# Patient Record
Sex: FEMALE | Race: OTHER | HISPANIC OR LATINO | URBAN - METROPOLITAN AREA
[De-identification: names, ages, dates, MRNs, and addresses within clinical notes are randomized per-mention and may not be internally consistent; named-entity substitution may affect disease eponyms.]

---
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
## 2021-01-07 PROCEDURE — 90853 GROUP PSYCHOTHERAPY: CPT | Mod: 95

## 2021-01-14 PROCEDURE — 90853 GROUP PSYCHOTHERAPY: CPT | Mod: 95

## 2021-01-21 PROCEDURE — 90853 GROUP PSYCHOTHERAPY: CPT | Mod: 95

## 2022-06-08 NOTE — ED ADULT TRIAGE NOTE - NS_BHTRGSOURCE_ED_A_ED_FT
Hi, this is to let you know that your recent results showed:  Stable kidney function tests.  Stable blood sugar.  Normal liver function tests.  Minimal anemia better than before.
Mo Bartlett

## 2022-11-21 ENCOUNTER — OUTPATIENT (OUTPATIENT)
Dept: OUTPATIENT SERVICES | Facility: HOSPITAL | Age: 22
LOS: 1 days | Discharge: HOME | End: 2022-11-21
Payer: COMMERCIAL

## 2022-11-21 DIAGNOSIS — F32.9 MAJOR DEPRESSIVE DISORDER, SINGLE EPISODE, UNSPECIFIED: ICD-10-CM

## 2023-04-14 ENCOUNTER — OUTPATIENT (OUTPATIENT)
Dept: OUTPATIENT SERVICES | Facility: HOSPITAL | Age: 23
LOS: 1 days | Discharge: STILL ADMITTED OR ADMINISTRATIVE | End: 2023-04-14
Payer: COMMERCIAL

## 2023-04-20 NOTE — ED PROVIDER NOTE - CCCP TRG CHIEF CMPLNT
[facility]     Patient : Martha Salmon Age: 30 year old Sex: female   MRN: [id number] Encounter Date: 4/20/2023    History     Chief Complaint   Patient presents with   • Abdominal Pain   • Nausea   • Vomiting     HPI  Patient is a 30-year-old female presents emergency with abdominal anxious ongoing for 3 days.  Patient denies any precipitating or modifying factors but states has been having nausea and vomiting.  Denies any vaginal discharge, chest pain or difficulty breathing.  Patient states that the pain has worsened therefore, presents to the ER.\  No Known Allergies    No past medical history on file.    No past surgical history on file.    No family history on file.    No Known Allergies    Social History     Tobacco Use   • Smoking status: Never   • Smokeless tobacco: Never   Substance Use Topics   • Alcohol use: Not Currently       Review of Systems     Review of Systems   Constitutional: Negative for chills and fatigue.   HENT: Negative.    Eyes: Negative.    Respiratory: Negative for shortness of breath.    Cardiovascular: Negative for chest pain.   Gastrointestinal: Positive for abdominal pain, nausea and vomiting. Negative for diarrhea.   Endocrine: Negative.    Genitourinary: Negative.    Musculoskeletal: Negative.    Skin: Negative.    Allergic/Immunologic: Negative.    Neurological: Negative for dizziness, syncope, weakness and numbness.   Hematological: Negative.    Psychiatric/Behavioral: Negative.    All other systems reviewed and are negative.      Physical Exam     ED Triage Vitals   ED Triage Vitals Group      Temp 04/19/23 1501 98.4 °F (36.9 °C)      Heart Rate 04/19/23 1457 72      Resp 04/19/23 1502 16      BP 04/19/23 1502 117/78      SpO2 04/19/23 1457 100 %      EtCO2 mmHg --       Height 04/19/23 1451 5' 5\" (1.651 m)      Weight 04/19/23 1451 216 lb (98 kg)      Weight Scale Used 04/19/23 1451 ED Stated      BMI (Calculated) 04/19/23 1451  35.94      IBW/kg (Calculated) 04/19/23 1451 57       Physical Exam  Vitals and nursing note reviewed.   Constitutional:       General: She is not in acute distress.     Appearance: Normal appearance.   HENT:      Head: Normocephalic and atraumatic.      Right Ear: External ear normal.      Left Ear: External ear normal.      Nose: Nose normal.      Mouth/Throat:      Mouth: Mucous membranes are moist.      Pharynx: Oropharynx is clear.      Neck: Normal range of motion.   Eyes:      Extraocular Movements: Extraocular movements intact.      Conjunctiva/sclera: Conjunctivae normal.      Pupils: Pupils are equal, round, and reactive to light.   Cardiovascular:      Rate and Rhythm: Normal rate and regular rhythm.      Pulses: Normal pulses.      Heart sounds: Normal heart sounds.   Pulmonary:      Effort: Pulmonary effort is normal. No respiratory distress.      Breath sounds: Normal breath sounds. No wheezing or rales.   Abdominal:      General: Abdomen is flat.      Palpations: Abdomen is soft.      Tenderness: There is abdominal tenderness in the left upper quadrant and left lower quadrant. There is no guarding or rebound.   Genitourinary:     Vagina: Normal.      Cervix: Normal.      Uterus: Normal.    Musculoskeletal:         General: Normal range of motion.   Skin:     General: Skin is warm.      Capillary Refill: Capillary refill takes less than 2 seconds.      Coloration: Skin is not jaundiced or pale.      Findings: No bruising, erythema, lesion or rash.   Neurological:      General: No focal deficit present.      Mental Status: She is alert and oriented to person, place, and time. Mental status is at baseline.   Psychiatric:         Mood and Affect: Mood normal.         Behavior: Behavior normal.         Procedures    ED Course     ED Course as of 04/23/23 1612   Thu Apr 20, 2023   0710 30F LLQ LUQ abd pain, CT negative, poss ovarian cyst. Pending US.  [TA]      ED Course User Index  [TA] Vi  MD Valentina       Lab Results     ED Medication Orders (From admission, onward)    Ordered Start     Status Ordering Provider    04/19/23 1945 04/19/23 1946  acetaminophen (TYLENOL) tablet 1,000 mg  ONCE         Last MAR action: Given EDDIE EMMANUEL          Results for orders placed or performed during the hospital encounter of 04/20/23   Comprehensive Metabolic Panel   Result Value Ref Range    Fasting Status      Sodium 138 135 - 145 mmol/L    Potassium 3.7 3.4 - 5.1 mmol/L    Chloride 102 97 - 110 mmol/L    Carbon Dioxide 30 21 - 32 mmol/L    Anion Gap 10 7 - 19 mmol/L    Glucose 94 70 - 99 mg/dL    BUN 11 6 - 20 mg/dL    Creatinine 1.18 (H) 0.51 - 0.95 mg/dL    Glomerular Filtration Rate 64 >=60    BUN/Cr 9 7 - 25    Calcium 8.8 8.4 - 10.2 mg/dL    Bilirubin, Total 0.2 0.2 - 1.0 mg/dL    GOT/AST 11 <=37 Units/L    GPT/ALT 18 <64 Units/L    Alkaline Phosphatase 49 45 - 117 Units/L    Albumin 4.0 3.6 - 5.1 g/dL    Protein, Total 7.3 6.4 - 8.2 g/dL    Globulin 3.3 2.0 - 4.0 g/dL    A/G Ratio 1.2 1.0 - 2.4   Lipase   Result Value Ref Range    Lipase 109 73 - 393 Units/L   Urinalysis & Reflex Microscopy With Culture If Indicated   Result Value Ref Range    COLOR, URINALYSIS Straw     APPEARANCE, URINALYSIS Clear     GLUCOSE, URINALYSIS Negative Negative mg/dL    BILIRUBIN, URINALYSIS Negative Negative    KETONES, URINALYSIS Negative Negative mg/dL    SPECIFIC GRAVITY, URINALYSIS 1.027 1.005 - 1.030    OCCULT BLOOD, URINALYSIS Negative Negative    PH, URINALYSIS 8.0 (H) 5.0 - 7.0    PROTEIN, URINALYSIS Trace (A) Negative mg/dL    UROBILINOGEN, URINALYSIS 0.2 0.2, 1.0 mg/dL    NITRITE, URINALYSIS Negative Negative    LEUKOCYTE ESTERASE, URINALYSIS Negative Negative   CBC with Automated Differential (performable only)   Result Value Ref Range    WBC 7.5 4.2 - 11.0 K/mcL    RBC 5.00 4.00 - 5.20 mil/mcL    HGB 12.9 12.0 - 15.5 g/dL    HCT 38.7 36.0 - 46.5 %    MCV 77.4 (L) 78.0 - 100.0 fl    MCH 25.8 (L) 26.0 - 34.0 pg     MCHC 33.3 32.0 - 36.5 g/dL    RDW-CV 13.9 11.0 - 15.0 %    RDW-SD 39.0 39.0 - 50.0 fL     140 - 450 K/mcL    NRBC 0 <=0 /100 WBC    Neutrophil, Percent 43 %    Lymphocytes, Percent 49 %    Mono, Percent 5 %    Eosinophils, Percent 2 %    Basophils, Percent 1 %    Immature Granulocytes 0 %    Absolute Neutrophils 3.2 1.8 - 7.7 K/mcL    Absolute Lymphocytes 3.7 1.0 - 4.8 K/mcL    Absolute Monocytes 0.4 0.3 - 0.9 K/mcL    Absolute Eosinophils  0.1 0.0 - 0.5 K/mcL    Absolute Basophils 0.0 0.0 - 0.3 K/mcL    Absolute Immature Granulocytes 0.0 0.0 - 0.2 K/mcL   URINALYSIS, MACROSCOPIC -POINT OF CARE   Result Value Ref Range    COLOR - POINT OF CARE Yellow     APPEARANCE, URINALYSIS - POINT OF CARE Clear     GLUCOSE, URINALYSIS - POINT OF CARE Negative Negative mg/dL    BILIRUBIN, URINALYSIS - POINT OF CARE Negative Negative    KETONES, URINALYSIS - POINT OF CARE Negative Negative mg/dL    SPECIFIC GRAVITY, URINALYSIS - POINT OF CARE 1.015 1.005 - 1.030 NULL    OCCULT BLOOD, URINALYSIS - POINT OF CARE Negative Negative    PH, URINALYSIS - POINT OF CARE 8.5 (H) 5.0 - 7.0 Units    PROTEIN, URINALYSIS - POINT OF CARE 30 (A) Negative mg/dL    UROBILINOGEN, URINALYSIS - POINT OF CARE 0.2 0.2, 1.0 mg/dL    NITRITE, URINALYSIS - POINT OF CARE Negative Negative    WBC ESTERASE, URINALYSIS - POINT OF CARE Negative Negative   HCG POC   Result Value Ref Range    HCG, URINE - POINT OF CARE Negative Negative         Radiology Results     Imaging Results          US PELVIS NON-OB EXTERNAL TRANSABDOMINAL AND INTERNAL TRANSVAGINAL (Final result)  Result time 04/20/23 10:33:13    Final result                 Impression:    Sonographically normal pelvis including the right ovary.            Electronically Signed by: ILA CARDONA M.D.   Signed on: 4/20/2023 10:33 AM   Workstation ID: [id number]             Narrative:    EXAM: Pelvic ultrasound transabdominal and endovaginal    INDICATION prominent ovary on CT from earlier same  episode, abdominal pain     Comparing the CT which reported a prominent right ovary.    FINDINGS:   Normal right ovary 4 x 3.5 x 2 cm.  Normal left ovary 3 x 2.5 x 1.5 cm.   No adnexal mass or cyst.  No free fluid.      Endometrial thickness 9 mm.  Imaging is myometrium.  Uterus 8 x 5 x 4 cm  without mass.       Normal arterial and venous waveforms in each ovary.                                 CT ABDOMEN PELVIS W CONTRAST (Final result)  Result time 04/20/23 07:32:35    Final result                 Impression:    Prominent right ovary.  Small free fluid in the cul-de-sac.   Ultrasound is an option.  Otherwise, nothing remarkable.    Electronically Signed by: INES KRAMER M.D.   Signed on: 4/20/2023 7:32 AM   Workstation ID: [id number]             Narrative:    EXAM:  CT ABDOMEN PELVIS W CONTRAST    CLINICAL INDICATION: Left lower quadrant pain     5 mm axial sections through the lower chest, abdomen and pelvis with IV  contrast.  Multiplanar reconstructions were done.     No prior examination for comparison.      Lung bases are clear.  Specifically, no infiltrates or atelectatic  changes.  No pleural fluid.      The liver, gallbladder, spleen, pancreas, adrenal glands and kidneys  unremarkable.  Stomach is moderately distended with food/debris and air.   No abdominal or pelvic masses or adenopathy.  No large or small bowel  obstructive changes.  Normal appendix.  Unremarkable uterus.  Small free  pelvic fluid in the cul-de-sac.  Slightly prominent right ovary.   Unremarkable lower thoracic and lumbar spine.                                MDM     MDM  Patient is a 30-year-old female presents emergency room abdominal pain, differential is ectopic pregnancy, ovarian pathology versus unknown specified abdominal issue with bowel loops.  Such as diverticulitis versus colitis.  At this time, patient will receive CT scan and laboratory studies    Labs reviewed and noted to be grossly unremarkable    CT scan reviewed  shows no evidence of an acute process except for the enlarged right ovary, therefore pelvic exam was performed    Pelvic exam was within normal limits    Patient signed out pending results of ultrasound, patient reassessed advised of plan for ultrasound and agreeable with discharge should ultrasound be negative    Disposition pending    Diagnosis  1. Acute abdominal pain, unknown origin    Clinical Impression     No diagnosis found.    Disposition     There is no disposition no dispo time  There is no comment      Darcy Kelley MD   4/20/2023 1:04 AM              Darcy Kelley MD  04/23/23 3481     psychiatric evaluation

## 2023-08-03 PROCEDURE — 90853 GROUP PSYCHOTHERAPY: CPT | Mod: 95

## 2023-08-10 PROCEDURE — 90853 GROUP PSYCHOTHERAPY: CPT | Mod: 95

## 2023-08-17 PROCEDURE — 90837 PSYTX W PT 60 MINUTES: CPT | Mod: 95

## 2023-08-24 PROCEDURE — 90853 GROUP PSYCHOTHERAPY: CPT

## 2023-09-07 PROCEDURE — 90853 GROUP PSYCHOTHERAPY: CPT

## 2023-09-14 PROCEDURE — 90853 GROUP PSYCHOTHERAPY: CPT

## 2023-09-21 PROCEDURE — 90834 PSYTX W PT 45 MINUTES: CPT

## 2023-09-28 PROCEDURE — 90853 GROUP PSYCHOTHERAPY: CPT

## 2023-09-30 ENCOUNTER — EMERGENCY (EMERGENCY)
Facility: HOSPITAL | Age: 23
LOS: 1 days | Discharge: HOME | End: 2023-09-30
Admitting: EMERGENCY MEDICINE
Payer: COMMERCIAL

## 2023-09-30 VITALS
OXYGEN SATURATION: 100 % | DIASTOLIC BLOOD PRESSURE: 87 MMHG | HEART RATE: 106 BPM | TEMPERATURE: 98 F | RESPIRATION RATE: 18 BRPM | SYSTOLIC BLOOD PRESSURE: 132 MMHG

## 2023-09-30 DIAGNOSIS — F43.20 ADJUSTMENT DISORDER, UNSPECIFIED: ICD-10-CM

## 2023-09-30 PROCEDURE — 99284 EMERGENCY DEPT VISIT MOD MDM: CPT

## 2023-09-30 PROCEDURE — 90792 PSYCH DIAG EVAL W/MED SRVCS: CPT | Mod: GC

## 2023-09-30 NOTE — ED PROVIDER NOTE - NSICDXPASTMEDICALHX_GEN_ALL_CORE_FT
PAST MEDICAL HISTORY:  Anxiety     Anxiety     Depression     PCOS (polycystic ovarian syndrome)

## 2023-09-30 NOTE — ED BEHAVIORAL HEALTH ASSESSMENT NOTE - SUMMARY
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     Patient presents w/ anxious and affective symptoms, including low mood, hopelessness, anhedonia, and passive thoughts of death, in setting of school-related stressors. Patient adamantly denies active suicidal ideation/intent/plan/research/preparation. Cites parents as protective factors. Future-oriented. Connected to care at [facility]. Able to safety plan. Patient not interested in voluntary admission. Does not present as an imminent danger to herself or others and thus does not require involuntary hospitalization. Will discharge w/ plan for outpatient f/u.

## 2023-09-30 NOTE — ED BEHAVIORAL HEALTH ASSESSMENT NOTE - RISK ASSESSMENT
Acute risk factors include hopelessness, passive SI. Chronic risk factors include history of psychiatric illness, history of prior hospitalization, history of poor coping skills. Protective factors include no active SI, no history SA, in treatment, future oriented, supportive family. Overall, patient poses limited risk of harm to self or others and does not require inpatient hospitalization.

## 2023-09-30 NOTE — ED ADULT NURSE NOTE - NSFALLUNIVINTERV_ED_ALL_ED
Bed/Stretcher in lowest position, wheels locked, appropriate side rails in place/Call bell, personal items and telephone in reach/Instruct patient to call for assistance before getting out of bed/chair/stretcher/Non-slip footwear applied when patient is off stretcher/[place] to call system/Physically safe environment - no spills, clutter or unnecessary equipment/Purposeful proactive rounding/Room/bathroom lighting operational, light cord in reach

## 2023-09-30 NOTE — ED BEHAVIORAL HEALTH ASSESSMENT NOTE - NSBHATTESTCOMMENTATTENDFT_PSY_A_CORE
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   She presents w/ anxious and mildly dysphoric secondary to school stress, and has no clear suicidality (no clear suicidal ideation/intent/plan/research/preparation.)  Patient not interested in voluntary admission. Does not present as an imminent danger to herself or others and thus does not require involuntary hospitalization.     Plan: d/c to self care, follow up outpatient treatment team, safety plan

## 2023-09-30 NOTE — ED PROVIDER NOTE - PATIENT PORTAL LINK FT
You can access the FollowMyHealth Patient Portal offered by [facility] by registering at the following website: [email or web address]. By joining [employer]’s FollowMyHealth portal, you will also be able to view your health information using other applications (apps) compatible with our system.

## 2023-09-30 NOTE — ED ADULT TRIAGE NOTE - CHIEF COMPLAINT QUOTE
Pt c/o worsening depression related to increased stress with school. Pt denies active S/I, states "I have feelings of not wanting to exist." Pt requesting changes to meds, unable to reach therapist. Reports compliance with depression meds.

## 2023-09-30 NOTE — ED BEHAVIORAL HEALTH ASSESSMENT NOTE - HPI (INCLUDE ILLNESS QUALITY, SEVERITY, DURATION, TIMING, CONTEXT, MODIFYING FACTORS, ASSOCIATED SIGNS AND SYMPTOMS)
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     Chart reviewed. Patient tearful, dysphoric at times. Overall, calm and cooperative. Patient reports low mood, hopelessness, anhedonia, and passive thoughts of death since the start of the semester. Patient reports that she is in her 6th year at [employer], w/ plan to graduate in the spring. She reports she has had to leave school numerous times due to pressure to do well at school. Patient feels constant pressure to get perfect grades. Says any grade below A would be a failure. Discusses course-load for this semester. Describes frequent worry about upcoming assignments and exams. Today, patient felt overwhelmed because she had to complete an assignment. Reports feeling intense sadness, loneliness, lack of motivation to complete assignment this morning. Began to feel like "I could not go on anymore." Says school feels pointless because she does not know what her plan is for after college. Although patient endorses passive thoughts of death, she adamantly denies active suicidal ideation/intent/plan/research into means/preparation. Patient is looking forward to graduating and being finished w/ school. She cites her parents as reasons to continue living. Writer and patient discuss various coping skills, including deep breathing and mindfulness. She reports fair sleep, good appetite, and ability to tend to ADLs. She denies history of NSSIB and SA. Denies psychotic symptoms, including paranoia, AH, IOR, MR, TI, TW, TB. No manic symptoms, including decreased need for sleep, persistently elevated and/or irritable mood, and increase in goal-directed behavior. No substance use. Patient not interested in voluntary admission.     Collateral information obtained from patient's father Mo Bartlett. Reports patient has been under a lot of stress since start of schoolyear. Today, patient was sad, crying, anxious because she had to submit an assignment. Father believes, overall, patient had been doing fairly well until today. Says patient is able to take care of herself and has been tending to her ADLs. Denies any recent suicidal comments or behaviors. Father is not acutely concerned for patient's safety. He is not advocating for admission.

## 2023-09-30 NOTE — ED PROVIDER NOTE - OBJECTIVE STATEMENT
22 y/o F  hx Depression, HIV presents to ER  secondary to depression . Admits to multiple stressors.  Denies SI/HI/AH/VH. Denies  falling, punching or kicking any objects. No evidence of physical injuries, broken  skin or deformities. Denies pain, SOB, fever, chills, chest/abdominal discomfort. Denies use of alcohol or illicit drugs.

## 2023-09-30 NOTE — ED PROVIDER NOTE - CLINICAL SUMMARY MEDICAL DECISION MAKING FREE TEXT BOX
Medical evaluation performed. There is no clinical evidence of intoxication or any acute medical problem requiring immediate intervention. Patient is awaiting psychiatric consultation. Final disposition will be determined by psychiatrist. 22 y/o F  Medical evaluation performed. There is no clinical evidence of intoxication or any acute medical problem requiring immediate intervention. Patient is awaiting psychiatric consultation. Final disposition will be determined by psychiatrist.

## 2023-09-30 NOTE — ED BEHAVIORAL HEALTH ASSESSMENT NOTE - DESCRIPTION
No PO or IM PRNs for agitation required.     T(C): 36.7 (30 Sep 2023 17:51), Max: 36.7 (30 Sep 2023 17:51)  T(F): 98 (30 Sep 2023 17:51), Max: 98 (30 Sep 2023 17:51)  HR: 106 (30 Sep 2023 17:51) (106 - 106)  BP: 132/87 (30 Sep 2023 17:51) (132/87 - 132/87)  BP(mean): --  ABP: --  ABP(mean): --  RR: 18 (30 Sep 2023 17:51) (18 - 18)  SpO2: 100% (30 Sep 2023 17:51) (100% - 100%)    O2 Parameters below as of 30 Sep 2023 17:51  Patient On (Oxygen Delivery Method): room air PCOS single, college student, lives w/ parents

## 2023-09-30 NOTE — ED ADULT NURSE NOTE - OBJECTIVE STATEMENT
Received pt in  pt calm c/o depression pt denies si/hi/avh presently, emotional support provided safety & comfort measures maintained eval on going.

## 2023-10-05 PROCEDURE — 90853 GROUP PSYCHOTHERAPY: CPT

## 2023-10-26 PROCEDURE — 90837 PSYTX W PT 60 MINUTES: CPT

## 2023-12-13 ENCOUNTER — INPATIENT (INPATIENT)
Facility: HOSPITAL | Age: 23
LOS: 25 days | Discharge: HOME | End: 2024-01-08
Attending: STUDENT IN AN ORGANIZED HEALTH CARE EDUCATION/TRAINING PROGRAM | Admitting: PSYCHIATRY & NEUROLOGY
Payer: COMMERCIAL

## 2023-12-13 VITALS
OXYGEN SATURATION: 98 % | TEMPERATURE: 98 F | SYSTOLIC BLOOD PRESSURE: 127 MMHG | RESPIRATION RATE: 18 BRPM | HEART RATE: 86 BPM | DIASTOLIC BLOOD PRESSURE: 82 MMHG

## 2023-12-13 DIAGNOSIS — F32.9 MAJOR DEPRESSIVE DISORDER, SINGLE EPISODE, UNSPECIFIED: ICD-10-CM

## 2023-12-13 LAB
ALBUMIN SERPL ELPH-MCNC: 4.2 G/DL — SIGNIFICANT CHANGE UP (ref 3.3–5)
ALBUMIN SERPL ELPH-MCNC: 4.2 G/DL — SIGNIFICANT CHANGE UP (ref 3.3–5)
ALP SERPL-CCNC: 111 U/L — SIGNIFICANT CHANGE UP (ref 40–120)
ALP SERPL-CCNC: 111 U/L — SIGNIFICANT CHANGE UP (ref 40–120)
ALT FLD-CCNC: 26 U/L — SIGNIFICANT CHANGE UP (ref 4–33)
ALT FLD-CCNC: 26 U/L — SIGNIFICANT CHANGE UP (ref 4–33)
AMORPH SED URNS QL MICRO: PRESENT
AMORPH SED URNS QL MICRO: PRESENT
AMPHET UR-MCNC: NEGATIVE — SIGNIFICANT CHANGE UP
AMPHET UR-MCNC: NEGATIVE — SIGNIFICANT CHANGE UP
ANION GAP SERPL CALC-SCNC: 10 MMOL/L — SIGNIFICANT CHANGE UP (ref 7–14)
ANION GAP SERPL CALC-SCNC: 10 MMOL/L — SIGNIFICANT CHANGE UP (ref 7–14)
APAP SERPL-MCNC: <10 UG/ML — LOW (ref 15–25)
APAP SERPL-MCNC: <10 UG/ML — LOW (ref 15–25)
APPEARANCE UR: ABNORMAL
AST SERPL-CCNC: 20 U/L — SIGNIFICANT CHANGE UP (ref 4–32)
AST SERPL-CCNC: 20 U/L — SIGNIFICANT CHANGE UP (ref 4–32)
BACTERIA # UR AUTO: ABNORMAL /HPF
BARBITURATES UR SCN-MCNC: NEGATIVE — SIGNIFICANT CHANGE UP
BARBITURATES UR SCN-MCNC: NEGATIVE — SIGNIFICANT CHANGE UP
BASOPHILS # BLD AUTO: 0.07 K/UL — SIGNIFICANT CHANGE UP (ref 0–0.2)
BASOPHILS # BLD AUTO: 0.07 K/UL — SIGNIFICANT CHANGE UP (ref 0–0.2)
BASOPHILS NFR BLD AUTO: 0.7 % — SIGNIFICANT CHANGE UP (ref 0–2)
BASOPHILS NFR BLD AUTO: 0.7 % — SIGNIFICANT CHANGE UP (ref 0–2)
BENZODIAZ UR-MCNC: NEGATIVE — SIGNIFICANT CHANGE UP
BENZODIAZ UR-MCNC: NEGATIVE — SIGNIFICANT CHANGE UP
BILIRUB SERPL-MCNC: <0.2 MG/DL — SIGNIFICANT CHANGE UP (ref 0.2–1.2)
BILIRUB SERPL-MCNC: <0.2 MG/DL — SIGNIFICANT CHANGE UP (ref 0.2–1.2)
BILIRUB UR-MCNC: NEGATIVE — SIGNIFICANT CHANGE UP
BUN SERPL-MCNC: 10 MG/DL — SIGNIFICANT CHANGE UP (ref 7–23)
BUN SERPL-MCNC: 10 MG/DL — SIGNIFICANT CHANGE UP (ref 7–23)
CALCIUM SERPL-MCNC: 9.7 MG/DL — SIGNIFICANT CHANGE UP (ref 8.4–10.5)
CALCIUM SERPL-MCNC: 9.7 MG/DL — SIGNIFICANT CHANGE UP (ref 8.4–10.5)
CAST: 0 /LPF — SIGNIFICANT CHANGE UP (ref 0–4)
CAST: 0 /LPF — SIGNIFICANT CHANGE UP (ref 0–4)
CAST: 2 /LPF — SIGNIFICANT CHANGE UP (ref 0–4)
CAST: 2 /LPF — SIGNIFICANT CHANGE UP (ref 0–4)
CHLORIDE SERPL-SCNC: 101 MMOL/L — SIGNIFICANT CHANGE UP (ref 98–107)
CHLORIDE SERPL-SCNC: 101 MMOL/L — SIGNIFICANT CHANGE UP (ref 98–107)
CO2 SERPL-SCNC: 27 MMOL/L — SIGNIFICANT CHANGE UP (ref 22–31)
CO2 SERPL-SCNC: 27 MMOL/L — SIGNIFICANT CHANGE UP (ref 22–31)
COCAINE METAB.OTHER UR-MCNC: NEGATIVE — SIGNIFICANT CHANGE UP
COCAINE METAB.OTHER UR-MCNC: NEGATIVE — SIGNIFICANT CHANGE UP
COLOR SPEC: YELLOW — SIGNIFICANT CHANGE UP
CREAT SERPL-MCNC: 0.68 MG/DL — SIGNIFICANT CHANGE UP (ref 0.5–1.3)
CREAT SERPL-MCNC: 0.68 MG/DL — SIGNIFICANT CHANGE UP (ref 0.5–1.3)
CREATININE URINE RESULT, DAU: 112 MG/DL — SIGNIFICANT CHANGE UP
CREATININE URINE RESULT, DAU: 112 MG/DL — SIGNIFICANT CHANGE UP
DIFF PNL FLD: ABNORMAL
DIFF PNL FLD: ABNORMAL
DIFF PNL FLD: NEGATIVE — SIGNIFICANT CHANGE UP
DIFF PNL FLD: NEGATIVE — SIGNIFICANT CHANGE UP
EGFR: 125 ML/MIN/1.73M2 — SIGNIFICANT CHANGE UP
EGFR: 125 ML/MIN/1.73M2 — SIGNIFICANT CHANGE UP
EOSINOPHIL # BLD AUTO: 0.21 K/UL — SIGNIFICANT CHANGE UP (ref 0–0.5)
EOSINOPHIL # BLD AUTO: 0.21 K/UL — SIGNIFICANT CHANGE UP (ref 0–0.5)
EOSINOPHIL NFR BLD AUTO: 2 % — SIGNIFICANT CHANGE UP (ref 0–6)
EOSINOPHIL NFR BLD AUTO: 2 % — SIGNIFICANT CHANGE UP (ref 0–6)
ETHANOL SERPL-MCNC: <10 MG/DL — SIGNIFICANT CHANGE UP
ETHANOL SERPL-MCNC: <10 MG/DL — SIGNIFICANT CHANGE UP
GLUCOSE SERPL-MCNC: 111 MG/DL — HIGH (ref 70–99)
GLUCOSE SERPL-MCNC: 111 MG/DL — HIGH (ref 70–99)
GLUCOSE UR QL: NEGATIVE MG/DL — SIGNIFICANT CHANGE UP
HCG SERPL-ACNC: <1 MIU/ML — SIGNIFICANT CHANGE UP
HCG SERPL-ACNC: <1 MIU/ML — SIGNIFICANT CHANGE UP
HCT VFR BLD CALC: 43.2 % — SIGNIFICANT CHANGE UP (ref 34.5–45)
HCT VFR BLD CALC: 43.2 % — SIGNIFICANT CHANGE UP (ref 34.5–45)
HGB BLD-MCNC: 14 G/DL — SIGNIFICANT CHANGE UP (ref 11.5–15.5)
HGB BLD-MCNC: 14 G/DL — SIGNIFICANT CHANGE UP (ref 11.5–15.5)
IANC: 6.93 K/UL — SIGNIFICANT CHANGE UP (ref 1.8–7.4)
IANC: 6.93 K/UL — SIGNIFICANT CHANGE UP (ref 1.8–7.4)
IMM GRANULOCYTES NFR BLD AUTO: 0.2 % — SIGNIFICANT CHANGE UP (ref 0–0.9)
IMM GRANULOCYTES NFR BLD AUTO: 0.2 % — SIGNIFICANT CHANGE UP (ref 0–0.9)
KETONES UR-MCNC: ABNORMAL MG/DL
KETONES UR-MCNC: ABNORMAL MG/DL
KETONES UR-MCNC: NEGATIVE MG/DL — SIGNIFICANT CHANGE UP
KETONES UR-MCNC: NEGATIVE MG/DL — SIGNIFICANT CHANGE UP
LEUKOCYTE ESTERASE UR-ACNC: ABNORMAL
LYMPHOCYTES # BLD AUTO: 2.8 K/UL — SIGNIFICANT CHANGE UP (ref 1–3.3)
LYMPHOCYTES # BLD AUTO: 2.8 K/UL — SIGNIFICANT CHANGE UP (ref 1–3.3)
LYMPHOCYTES # BLD AUTO: 26.4 % — SIGNIFICANT CHANGE UP (ref 13–44)
LYMPHOCYTES # BLD AUTO: 26.4 % — SIGNIFICANT CHANGE UP (ref 13–44)
MCHC RBC-ENTMCNC: 27.8 PG — SIGNIFICANT CHANGE UP (ref 27–34)
MCHC RBC-ENTMCNC: 27.8 PG — SIGNIFICANT CHANGE UP (ref 27–34)
MCHC RBC-ENTMCNC: 32.4 GM/DL — SIGNIFICANT CHANGE UP (ref 32–36)
MCHC RBC-ENTMCNC: 32.4 GM/DL — SIGNIFICANT CHANGE UP (ref 32–36)
MCV RBC AUTO: 85.7 FL — SIGNIFICANT CHANGE UP (ref 80–100)
MCV RBC AUTO: 85.7 FL — SIGNIFICANT CHANGE UP (ref 80–100)
METHADONE UR-MCNC: NEGATIVE — SIGNIFICANT CHANGE UP
METHADONE UR-MCNC: NEGATIVE — SIGNIFICANT CHANGE UP
MONOCYTES # BLD AUTO: 0.58 K/UL — SIGNIFICANT CHANGE UP (ref 0–0.9)
MONOCYTES # BLD AUTO: 0.58 K/UL — SIGNIFICANT CHANGE UP (ref 0–0.9)
MONOCYTES NFR BLD AUTO: 5.5 % — SIGNIFICANT CHANGE UP (ref 2–14)
MONOCYTES NFR BLD AUTO: 5.5 % — SIGNIFICANT CHANGE UP (ref 2–14)
NEUTROPHILS # BLD AUTO: 6.93 K/UL — SIGNIFICANT CHANGE UP (ref 1.8–7.4)
NEUTROPHILS # BLD AUTO: 6.93 K/UL — SIGNIFICANT CHANGE UP (ref 1.8–7.4)
NEUTROPHILS NFR BLD AUTO: 65.2 % — SIGNIFICANT CHANGE UP (ref 43–77)
NEUTROPHILS NFR BLD AUTO: 65.2 % — SIGNIFICANT CHANGE UP (ref 43–77)
NITRITE UR-MCNC: NEGATIVE — SIGNIFICANT CHANGE UP
NRBC # BLD: 0 /100 WBCS — SIGNIFICANT CHANGE UP (ref 0–0)
NRBC # BLD: 0 /100 WBCS — SIGNIFICANT CHANGE UP (ref 0–0)
NRBC # FLD: 0 K/UL — SIGNIFICANT CHANGE UP (ref 0–0)
NRBC # FLD: 0 K/UL — SIGNIFICANT CHANGE UP (ref 0–0)
OPIATES UR-MCNC: NEGATIVE — SIGNIFICANT CHANGE UP
OPIATES UR-MCNC: NEGATIVE — SIGNIFICANT CHANGE UP
OXYCODONE UR-MCNC: NEGATIVE — SIGNIFICANT CHANGE UP
OXYCODONE UR-MCNC: NEGATIVE — SIGNIFICANT CHANGE UP
PCP SPEC-MCNC: SIGNIFICANT CHANGE UP
PCP SPEC-MCNC: SIGNIFICANT CHANGE UP
PCP UR-MCNC: NEGATIVE — SIGNIFICANT CHANGE UP
PCP UR-MCNC: NEGATIVE — SIGNIFICANT CHANGE UP
PH UR: 6 — SIGNIFICANT CHANGE UP (ref 5–8)
PH UR: 6 — SIGNIFICANT CHANGE UP (ref 5–8)
PH UR: 7.5 — SIGNIFICANT CHANGE UP (ref 5–8)
PH UR: 7.5 — SIGNIFICANT CHANGE UP (ref 5–8)
PLATELET # BLD AUTO: 361 K/UL — SIGNIFICANT CHANGE UP (ref 150–400)
PLATELET # BLD AUTO: 361 K/UL — SIGNIFICANT CHANGE UP (ref 150–400)
POTASSIUM SERPL-MCNC: 4.2 MMOL/L — SIGNIFICANT CHANGE UP (ref 3.5–5.3)
POTASSIUM SERPL-MCNC: 4.2 MMOL/L — SIGNIFICANT CHANGE UP (ref 3.5–5.3)
POTASSIUM SERPL-SCNC: 4.2 MMOL/L — SIGNIFICANT CHANGE UP (ref 3.5–5.3)
POTASSIUM SERPL-SCNC: 4.2 MMOL/L — SIGNIFICANT CHANGE UP (ref 3.5–5.3)
PROT SERPL-MCNC: 7.6 G/DL — SIGNIFICANT CHANGE UP (ref 6–8.3)
PROT SERPL-MCNC: 7.6 G/DL — SIGNIFICANT CHANGE UP (ref 6–8.3)
PROT UR-MCNC: NEGATIVE MG/DL — SIGNIFICANT CHANGE UP
RBC # BLD: 5.04 M/UL — SIGNIFICANT CHANGE UP (ref 3.8–5.2)
RBC # BLD: 5.04 M/UL — SIGNIFICANT CHANGE UP (ref 3.8–5.2)
RBC # FLD: 13.2 % — SIGNIFICANT CHANGE UP (ref 10.3–14.5)
RBC # FLD: 13.2 % — SIGNIFICANT CHANGE UP (ref 10.3–14.5)
RBC CASTS # UR COMP ASSIST: 4 /HPF — SIGNIFICANT CHANGE UP (ref 0–4)
RBC CASTS # UR COMP ASSIST: 4 /HPF — SIGNIFICANT CHANGE UP (ref 0–4)
RBC CASTS # UR COMP ASSIST: 5 /HPF — HIGH (ref 0–4)
RBC CASTS # UR COMP ASSIST: 5 /HPF — HIGH (ref 0–4)
REVIEW: SIGNIFICANT CHANGE UP
REVIEW: SIGNIFICANT CHANGE UP
SALICYLATES SERPL-MCNC: <0.3 MG/DL — LOW (ref 15–30)
SALICYLATES SERPL-MCNC: <0.3 MG/DL — LOW (ref 15–30)
SARS-COV-2 RNA SPEC QL NAA+PROBE: SIGNIFICANT CHANGE UP
SARS-COV-2 RNA SPEC QL NAA+PROBE: SIGNIFICANT CHANGE UP
SODIUM SERPL-SCNC: 138 MMOL/L — SIGNIFICANT CHANGE UP (ref 135–145)
SODIUM SERPL-SCNC: 138 MMOL/L — SIGNIFICANT CHANGE UP (ref 135–145)
SP GR SPEC: 1.02 — SIGNIFICANT CHANGE UP (ref 1–1.03)
SQUAMOUS # UR AUTO: 10 /HPF — HIGH (ref 0–5)
SQUAMOUS # UR AUTO: 10 /HPF — HIGH (ref 0–5)
SQUAMOUS # UR AUTO: 20 /HPF — HIGH (ref 0–5)
SQUAMOUS # UR AUTO: 20 /HPF — HIGH (ref 0–5)
THC UR QL: NEGATIVE — SIGNIFICANT CHANGE UP
THC UR QL: NEGATIVE — SIGNIFICANT CHANGE UP
TOXICOLOGY SCREEN, DRUGS OF ABUSE, SERUM RESULT: SIGNIFICANT CHANGE UP
TOXICOLOGY SCREEN, DRUGS OF ABUSE, SERUM RESULT: SIGNIFICANT CHANGE UP
TSH SERPL-MCNC: 0.72 UIU/ML — SIGNIFICANT CHANGE UP (ref 0.27–4.2)
TSH SERPL-MCNC: 0.72 UIU/ML — SIGNIFICANT CHANGE UP (ref 0.27–4.2)
UROBILINOGEN FLD QL: 0.2 MG/DL — SIGNIFICANT CHANGE UP (ref 0.2–1)
WBC # BLD: 10.61 K/UL — HIGH (ref 3.8–10.5)
WBC # BLD: 10.61 K/UL — HIGH (ref 3.8–10.5)
WBC # FLD AUTO: 10.61 K/UL — HIGH (ref 3.8–10.5)
WBC # FLD AUTO: 10.61 K/UL — HIGH (ref 3.8–10.5)
WBC UR QL: 18 /HPF — HIGH (ref 0–5)
WBC UR QL: 18 /HPF — HIGH (ref 0–5)
WBC UR QL: 37 /HPF — HIGH (ref 0–5)
WBC UR QL: 37 /HPF — HIGH (ref 0–5)

## 2023-12-13 PROCEDURE — 99285 EMERGENCY DEPT VISIT HI MDM: CPT

## 2023-12-13 RX ORDER — DIPHENHYDRAMINE HCL 50 MG
50 CAPSULE ORAL EVERY 6 HOURS
Refills: 0 | Status: DISCONTINUED | OUTPATIENT
Start: 2023-12-14 | End: 2024-01-08

## 2023-12-13 RX ORDER — QUETIAPINE FUMARATE 200 MG/1
150 TABLET, FILM COATED ORAL ONCE
Refills: 0 | Status: COMPLETED | OUTPATIENT
Start: 2023-12-13 | End: 2023-12-13

## 2023-12-13 RX ORDER — HALOPERIDOL DECANOATE 100 MG/ML
5 INJECTION INTRAMUSCULAR EVERY 6 HOURS
Refills: 0 | Status: DISCONTINUED | OUTPATIENT
Start: 2023-12-14 | End: 2024-01-08

## 2023-12-13 RX ORDER — HALOPERIDOL DECANOATE 100 MG/ML
5 INJECTION INTRAMUSCULAR ONCE
Refills: 0 | Status: DISCONTINUED | OUTPATIENT
Start: 2023-12-14 | End: 2024-01-08

## 2023-12-13 RX ORDER — VENLAFAXINE HCL 75 MG
75 CAPSULE, EXT RELEASE 24 HR ORAL DAILY
Refills: 0 | Status: DISCONTINUED | OUTPATIENT
Start: 2023-12-14 | End: 2023-12-14

## 2023-12-13 RX ORDER — DIPHENHYDRAMINE HCL 50 MG
50 CAPSULE ORAL ONCE
Refills: 0 | Status: DISCONTINUED | OUTPATIENT
Start: 2023-12-14 | End: 2024-01-08

## 2023-12-13 RX ORDER — CLONAZEPAM 1 MG
1 TABLET ORAL DAILY
Refills: 0 | Status: DISCONTINUED | OUTPATIENT
Start: 2023-12-14 | End: 2023-12-14

## 2023-12-13 RX ADMIN — QUETIAPINE FUMARATE 150 MILLIGRAM(S): 200 TABLET, FILM COATED ORAL at 22:39

## 2023-12-13 NOTE — ED ADULT NURSE NOTE - OBJECTIVE STATEMENT
Pt arrives via walk-in from home, bib dad, reports worsening depression w/o SI. Pt also denies hi,ah,vh,etoh, drug use. Pt reports she is only sleeping and eating at home, not showering , not in school or work, and lacks motivation. Calm and cooperative

## 2023-12-13 NOTE — ED BEHAVIORAL HEALTH ASSESSMENT NOTE - CURRENT PLAN:
----- Message from Andrae Vasquez sent at 11/2/2017  8:47 AM CDT -----  Pt is returning a call to nurse.          Please call pt back [phone]   None known

## 2023-12-13 NOTE — ED PROVIDER NOTE - CLINICAL SUMMARY MEDICAL DECISION MAKING FREE TEXT BOX
This is a 23-year-old female past medical history PCOS (not on med),  past psychiatric history anxiety depression with complaint of increased depression. Endorses she is not functioning well oversleeping and overeating, she had to withdraw from college last week because she felt very overwhelmed and were not able to complete her assignments. Endorses her psychiatrist started her on Seroquel 200 mg at the nighttime for the last 3 weeks but believes medication is not helping her. Taking Effexor 75 mg daily. Reports previous psychiatric inpatient hospitalization in 2019 for severe depression. Patient endorses suicidal thoughts in the context of passive ideation, "she  wish she would not be here" but would not kill herself, protective factors her family's Mandaen believes.  She is  seeking for inpatient psychiatric treatment.   father Mo   labs, psych This is a 23-year-old female past medical history PCOS (not on med),  past psychiatric history anxiety depression with complaint of increased depression. Endorses she is not functioning well oversleeping and overeating, she had to withdraw from college last week because she felt very overwhelmed and were not able to complete her assignments. Endorses her psychiatrist started her on Seroquel 200 mg at the nighttime for the last 3 weeks but believes medication is not helping her. Taking Effexor 75 mg daily. Reports previous psychiatric inpatient hospitalization in 2019 for severe depression. Patient endorses suicidal thoughts in the context of passive ideation, "she  wish she would not be here" but would not kill herself, protective factors her family's Amish believes.  She is  seeking for inpatient psychiatric treatment.   father Mo   labs, psych

## 2023-12-13 NOTE — ED ADULT NURSE NOTE - NSFALLUNIVINTERV_ED_ALL_ED
Bed/Stretcher in lowest position, wheels locked, appropriate side rails in place/Call bell, personal items and telephone in reach/Instruct patient to call for assistance before getting out of bed/chair/stretcher/Non-slip footwear applied when patient is off stretcher/[place] to call system/Physically safe environment - no spills, clutter or unnecessary equipment/Purposeful proactive rounding/Room/bathroom lighting operational, light cord in reach Bed/Stretcher in lowest position, wheels locked, appropriate side rails in place/Call bell, personal items and telephone in reach/Instruct patient to call for assistance before getting out of bed/chair/stretcher/Non-slip footwear applied when patient is off stretcher/[place] to call system/Physically safe environment - no spills, clutter or unnecessary equipment/Purposeful proactive rounding/Room/bathroom lighting operational, light cord in reach

## 2023-12-13 NOTE — ED BEHAVIORAL HEALTH ASSESSMENT NOTE - NSBHATTESTCOMMENTATTENDFT_PSY_A_CORE
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 Pt has been increasingly depressed, recently dropped her full caseload of online courses, has not been out of bed for the past 2 days. Pt is anxious, depressed, failing outpt level of care, despite compliance. Pt has passive SI as well. Though she has a good support system, pt is declining in her functioning. both outpt provider and family are advocating for admission at this time. Pt is amenable to voluntary admission. Pending bed. 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 Pt has been increasingly depressed, recently dropped her full caseload of online courses, has not been out of bed for the past 2 days. Pt is anxious, depressed, failing outpt level of care, despite compliance. Pt has passive SI as well. Though she has a good support system, pt is declining in her functioning. both outpt provider and family are advocating for admission at this time. Pt is amenable to voluntary admission. Pending bed.

## 2023-12-13 NOTE — ED BEHAVIORAL HEALTH NOTE - BEHAVIORAL HEALTH NOTE
As per request of provider, Writer contacted Mo Bartlett [phone] (Father) to obtain collateral information. The following information is per the father.    Patient is a 22 yo female domiciled w/ father, student [employer], hx of depression, bib father.    Reason for ED visit:  today father asked pt to get up because she has been in bed for the past 2 days not.  He says pt said she wanted to stay in bed until she deteriorated. He says pt has declined to shower, brush her teeth or get out of bed for the pats 2 days.    Symptoms/Hx: 2 days not caring for herself. He says pt presents depressed currently. He says pt endorsed passive si. Pt has no hx of si and no past suicide attempts. He denied any AVH, paranoia or delusions. He says pt’s hygiene is poor, appetite has decreased and pt is oversleeping.  He says pt had been struggling with college and dropped her classes one weeks ago which he suspects is the trigger.    Baseline: goes out, spends time with family, attends school.    Drug/alcohol use:  no drug or alcohol use reported.    Stressors:  college.    Treatment team: in treatment at [facility] o/p. father did not know specific details. He says pt has one prior psych admission a few years ago at [facility].    medical problems:  none reported.    Medication : unsure of medication. Compliant w/ medication.     Violence/aggression: no access to firearms.    Family Hx: mother has some depression as per .    Dispo: He thinks pt would benefit from psych admission. As per request of provider, Writer contacted Mo Bartlett [phone] (Father) to obtain collateral information. The following information is per the father.    Patient is a 24 yo female domiciled w/ father, student at [employer], hx of depression, bib father.    Reason for ED visit:  today father asked pt to get up because she has been in bed for the past 2 days not.  He says pt said she wanted to stay in bed until she deteriorated. He says pt has declined to shower, brush her teeth or get out of bed for the pats 2 days.    Symptoms/Hx: 2 days not caring for herself. He says pt presents depressed currently. He says pt endorsed passive si. Pt has no hx of si and no past suicide attempts. He denied any AVH, paranoia or delusions. He says pt’s hygiene is poor, appetite has decreased and pt is oversleeping.  He says pt had been struggling with college and dropped her classes one weeks ago which he suspects is the trigger.    Baseline: goes out, spends time with family, attends school.    Drug/alcohol use:  no drug or alcohol use reported.    Stressors:  college.    Treatment team: in treatment at [facility] o/p. father did not know specific details. He says pt has one prior psych admission a few years ago at [facility].    medical problems:  none reported.    Medication : unsure of medication. Compliant w/ medication.     Violence/aggression: no access to firearms.    Family Hx: mother has some depression as per .    Dispo: He thinks pt would benefit from psych admission. As per request of provider, Writer contacted Mo Bartlett [phone] (Father) to obtain collateral information. The following information is per the father.    Patient is a 22 yo female domiciled w/ father, student at [employer], hx of depression, bib father.    Reason for ED visit:  today father asked pt to get up because she has been in bed for the past 2 days not.  He says pt said she wanted to stay in bed until she deteriorated. He says pt has declined to shower, brush her teeth or get out of bed for the pats 2 days.    Symptoms/Hx: 2 days not caring for herself. He says pt presents depressed currently. He says pt endorsed passive si. Pt has no hx of si and no past suicide attempts. He denied any AVH, paranoia or delusions. He says pt’s hygiene is poor, appetite has decreased and pt is oversleeping.  He says pt had been struggling with college and dropped her classes one weeks ago which he suspects is the trigger.    Baseline: goes out, spends time with family, attends school.    Drug/alcohol use:  no drug or alcohol use reported.    Stressors:  college.    Treatment team: in treatment at [facility] o/p. father did not know specific details. He says pt has one prior psych admission a few years ago at [facility].    medical problems:  none reported.    Medication : unsure of medication. Compliant w/ medication.     Violence/aggression: no access to firearms.    Family Hx: mother has some depression as per .    Dispo: He thinks pt would benefit from psych admission. writer informed father of pt's admission and that he is boarding due to bed availability. As per request of provider, Writer contacted Mo Bartlett [phone] (Father) to obtain collateral information. The following information is per the father.    Patient is a 22 yo female domiciled w/ father, student at [employer], hx of depression, bib father.    Reason for ED visit:  today father asked pt to get up because she has been in bed for the past 2 days not.  He says pt said she wanted to stay in bed until she deteriorated. He says pt has declined to shower, brush her teeth or get out of bed for the pats 2 days.    Symptoms/Hx: 2 days not caring for herself. He says pt presents depressed currently. He says pt endorsed passive si. Pt has no hx of si and no past suicide attempts. He denied any AVH, paranoia or delusions. He says pt’s hygiene is poor, appetite has decreased and pt is oversleeping.  He says pt had been struggling with college and dropped her classes one weeks ago which he suspects is the trigger.    Baseline: goes out, spends time with family, attends school.    Drug/alcohol use:  no drug or alcohol use reported.    Stressors:  college.    Treatment team: in treatment at [facility] o/p. father did not know specific details. He says pt has one prior psych admission a few years ago at [facility].    medical problems:  none reported.    Medication : unsure of medication. Compliant w/ medication.     Violence/aggression: no access to firearms.    Family Hx: mother has some depression as per .    Dispo: He thinks pt would benefit from psych admission. writer informed father of pt's admission and that he is boarding due to bed availability.

## 2023-12-13 NOTE — ED BEHAVIORAL HEALTH ASSESSMENT NOTE - HPI (INCLUDE ILLNESS QUALITY, SEVERITY, DURATION, TIMING, CONTEXT, MODIFYING FACTORS, ASSOCIATED SIGNS AND SYMPTOMS)
23F, college student, single, domiciled with parents and sister with a PPHX of MDD, GEORGE, social anxiety disorder, avoidant personality disorder, currently in treatment at [facility], 1 psych hosp (11/7-11/15/19, for depression and poor affect regulation), no history of SA or NSSIB, no history of violence/aggression/legal issues, no history of substance use, PMH sig for PCOS, who presents to ED BIB by father for low mood and passive thoughts of death in setting of school-related stressors.     Calm and cooperative with interview. Neutral affect, linear.   Pt reports months long hx of low mood with worsening over past three days. Trigger unknown but of note pt withdrew from all her college courses 1 week ago in setting of "I wasn't doing my work and scared of failing". Over the past 3 days, reports hypersomnia, not wanting to get out of bed, decreased attn to ADLs (not showering or brushing teeth), anhedonia, hopelessness and worthlessness. No changes in appetite. Pt endorses passive SI, "my parents and sister will be better off without me", but denies intent or plan. No preparatory acts. States passive SI is chornic. No hx of SA. No NSSIB. Patient is interested in voluntary admission. Pt with one previous admission in 2019 for similar presentation. Denies psychotic symptoms, including paranoia, AH, IOR, MR, TI, TW, TB. No manic symptoms, including decreased need for sleep, persistently elevated and/or irritable mood, and increase in goal-directed behavior. No substance use. Currently in treatment with Dr. Sanders at [facility]. Adherent with treatment. Adherent with medications: effexor 75 mg, seroquel 150mg, klonopin 1 mg prn     Collateral information obtained from patient's father Mo Bartlett, see chart note. 23F, college student, single, domiciled with parents and sister with a PPHX of MDD, GEORGE, social anxiety disorder, avoidant personality disorder, currently in treatment at [facility], 1 psych hosp (11/7-11/15/19, for depression and poor affect regulation), no history of SA or NSSIB, no history of violence/aggression/legal issues, no history of substance use, PMH sig for PCOS, who presents to ED BIB by father for low mood and passive thoughts of death in setting of school-related stressors.     Calm and cooperative with interview. Neutral affect, linear.   Pt reports months long hx of low mood with worsening over past three days. Trigger unknown but of note pt withdrew from all her college courses 1 week ago in setting of "I wasn't doing my work and scared of failing". Over the past 3 days, reports hypersomnia, not wanting to get out of bed, decreased attn to ADLs (not showering or brushing teeth), anhedonia, hopelessness and worthlessness. No changes in appetite. Pt endorses passive SI, "my parents and sister will be better off without me", but denies intent or plan. No preparatory acts. States passive SI is chornic. No hx of SA. No NSSIB. Patient is interested in voluntary admission. Pt with one previous admission in 2019 for similar presentation. Denies psychotic symptoms, including paranoia, AH, IOR, MR, TI, TW, TB. No manic symptoms, including decreased need for sleep, persistently elevated and/or irritable mood, and increase in goal-directed behavior. No substance use. Currently in treatment with Dr. Sanders at [facility]. Adherent with treatment. Adherent with medications: effexor 75 mg, seroquel 150mg, klonopin 1 mg prn     Collateral information obtained from patient's father Mo Bartlett, see chart note. 23F, college student, single, domiciled with parents and sister with a PPHX of MDD, GEORGE, social anxiety disorder, avoidant personality disorder, currently in treatment at [facility], 1 psych hosp (11/7-11/15/19, for depression and poor affect regulation), no history of SA or NSSIB, no history of violence/aggression/legal issues, no history of substance use, PMH sig for PCOS, who presents to ED BIB by father for low mood and passive thoughts of death in setting of school-related stressors.     Calm and cooperative with interview. Neutral affect, linear.   Pt reports months long hx of low mood with worsening over past three days. Trigger unknown but of note pt withdrew from all her college courses 1 week ago in setting of "I wasn't doing my work and scared of failing". Over the past 3 days, reports hypersomnia, not wanting to get out of bed, decreased attn to ADLs (not showering or brushing teeth), anhedonia, hopelessness and worthlessness. No changes in appetite. Pt endorses passive SI, "my parents and sister will be better off without me", but denies intent or plan. No preparatory acts. States passive SI is chornic. No hx of SA. No NSSIB. Patient is interested in voluntary admission. Pt with one previous admission in 2019 for similar presentation. Denies psychotic symptoms, including paranoia, AH, IOR, MR, TI, TW, TB. No manic symptoms, including decreased need for sleep, persistently elevated and/or irritable mood, and increase in goal-directed behavior. No substance use. Currently in treatment with Dr. Sanders at [facility]. Adherent with treatment. Adherent with medications: effexor 75 mg, seroquel 150mg, klonopin 1 mg prn     Collateral information obtained from patient's father Mo Bartlett, see chart note.    Collateral obtained from Dr. Sanders- pt has been struggling with persistent mood symptoms and passive SI in context of school and family stressors. At most recent appointment, pt stated that they felt "they would lose control" and hospitalization was "coming". Dr. Sanders concerned for pt's ability to be managed at current level of care and is advocating for higher level of care. Pt medications are currently being titrated, last change was on 12/11. Has been considering switching medication for depression but has had difficulty with insurance. Pt has chronic SI but is able to safety plan. Last safety plan completed 11/13/23. 23F, college student, single, domiciled with parents and sister with a PPHX of MDD, GEORGE, social anxiety disorder, avoidant personality disorder, currently in treatment at [facility], 1 psych hosp (11/7-11/15/19, for depression and poor affect regulation), no history of SA or NSSIB, no history of violence/aggression/legal issues, no history of substance use, PMH sig for PCOS, who presents to ED BIB by father for low mood and passive thoughts of death in setting of school-related stressors.     Calm and cooperative with interview. Neutral affect, linear.   Pt reports months long hx of low mood with worsening over past three days. Trigger unknown but of note pt withdrew from all her college courses 1 week ago in setting of "I wasn't doing my work and scared of failing". Over the past 3 days, reports hypersomnia, not wanting to get out of bed, decreased attn to ADLs (not showering or brushing teeth), anhedonia, hopelessness and worthlessness. No changes in appetite. Pt endorses passive SI, "my parents and sister will be better off without me", but denies intent or plan. No preparatory acts. States passive SI is chornic. No hx of SA. No NSSIB. Patient is interested in voluntary admission. Pt with one previous admission in 2019 for similar presentation. Denies psychotic symptoms, including paranoia, AH, IOR, MR, TI, TW, TB. No manic symptoms, including decreased need for sleep, persistently elevated and/or irritable mood, and increase in goal-directed behavior. No substance use. Currently in treatment with Dr. Sanders at [facility]. Adherent with treatment. Adherent with medications: effexor 75 mg, seroquel 150mg, klonopin 1 mg prn     Collateral information obtained from patient's father Mo Bartlett, see chart note.    Collateral obtained from Dr. Sanders- pt has been struggling with persistent mood symptoms and passive SI in context of school and family stressors. At most recent appointment, pt stated that they felt "they would lose control" and hospitalization was "coming". Dr. Sanders concerned for pt's ability to be managed at current level of care and is advocating for higher level of care. Pt medications are currently being titrated, last change was on 12/11. Has been considering switching medication for depression but has had difficulty with insurance. Pt has chronic SI but is able to safety plan. Last safety plan completed 11/13/23.

## 2023-12-13 NOTE — ED BEHAVIORAL HEALTH ASSESSMENT NOTE - PSYCHIATRIC ISSUES AND PLAN (INCLUDE STANDING AND PRN MEDICATION)
agitation: Haldol 5 mg PO/IM, Ativan 2 mg PO/IM, Benadryl 50 mg PO/IM q6prn agitation: Haldol 5 mg PO/IM, Ativan 2 mg PO/IM, Benadryl 50 mg PO/IM q6prn; continue Effexor XR 75mg daily- pt in midst of tapering, changes to be made by primary team

## 2023-12-13 NOTE — ED ADULT TRIAGE NOTE - CHIEF COMPLAINT QUOTE
pt with hx depression c.o of increased depression for past few weeks, denies any SI or HI, pt appears calm and cooperative @ present

## 2023-12-13 NOTE — ED BEHAVIORAL HEALTH ASSESSMENT NOTE - SUMMARY
23F, college student, single, domiciled with parents and sister with a PPHX of MDD, GEORGE, social anxiety disorder, avoidant personality disorder, currently in treatment at [facility], 1 psych hosp (11/7-11/15/19, for depression and poor affect regulation), no history of SA or NSSIB, no history of violence/aggression/legal issues, no history of substance use, PMH sig for PCOS, who presents to ED BIB by father for low mood and passive thoughts of death in setting of school-related stressors.     Patient presents w/ affective symptoms, including low mood, hopelessness, anhedonia, and passive thoughts of death, in setting of school-related stressors. Pt with passive SI but denies any active SI/intent/plan/research/preparation. Pt is able to safety plan. Endorses reasons for living. However, pt is not future oriented at this time. She is connected to care at [facility]. Per collateral, there is concern re pts acute worsening of mood symptoms and inability to care for ADLs. Pt is requesting involuntary hospitalization. Patient and family in agreement with plan.     Plan:  -admit on 9.13 pending bed   -hold in ED pending bed  -reassess in morning pending bed 23F, college student, single, domiciled with parents and sister with a PPHX of MDD, GEORGE, social anxiety disorder, avoidant personality disorder, currently in treatment at [facility], 1 psych hosp (11/7-11/15/19, for depression and poor affect regulation), no history of SA or NSSIB, no history of violence/aggression/legal issues, no history of substance use, PMH sig for PCOS, who presents to ED BIB by father for low mood and passive thoughts of death in setting of school-related stressors.     Patient presents w/ affective symptoms, including low mood, hopelessness, anhedonia, and passive thoughts of death, in setting of school-related stressors. Pt with passive SI but denies any active SI/intent/plan/research/preparation. Pt is able to safety plan. Endorses reasons for living. However, pt is not future oriented at this time. She is connected to care at [facility]. Per collateral, there is concern re pts acute worsening of mood symptoms and inability to care for ADLs. Pt is requesting involuntary hospitalization. Patient and family in agreement with plan.     Plan:  -admit on 9.13 pending bed   -hold in ED pending bed  -reassess in morning pending bed 23F, college student, single, domiciled with parents and sister with a PPHX of MDD, GEORGE, social anxiety disorder, avoidant personality disorder, currently in treatment at [facility], 1 psych hosp (11/7-11/15/19, for depression and poor affect regulation), no history of SA or NSSIB, no history of violence/aggression/legal issues, no history of substance use, PMH sig for PCOS, who presents to ED BIB by father for low mood and passive thoughts of death in setting of school-related stressors.     Patient presents w/ affective symptoms, including low mood, hopelessness, anhedonia, and passive thoughts of death, in setting of school-related stressors. Pt with passive SI but denies any active SI/intent/plan/research/preparation. Pt is able to safety plan. Endorses reasons for living. However, pt is not future oriented at this time. She is connected to care at [facility]. Per collateral, there is concern re pts acute worsening of mood symptoms and inability to care for ADLs. Pt is requesting voluntary hospitalization. Patient and family in agreement with plan.     Plan:  -admit on 9.13 pending bed   -hold in ED pending bed  -reassess in morning pending bed  - pt on OCP to be restarted on unit- unclear if formulary: Tri-lo-sprintec 23F, college student, single, domiciled with parents and sister with a PPHX of MDD, GEORGE, social anxiety disorder, avoidant personality disorder, currently in treatment at [facility], 1 psych hosp (11/7-11/15/19, for depression and poor affect regulation), no history of SA or NSSIB, no history of violence/aggression/legal issues, no history of substance use, PMH sig for PCOS, who presents to ED BIB by father for low mood and passive thoughts of death in setting of school-related stressors.     Patient presents w/ affective symptoms, including low mood, hopelessness, anhedonia, and passive thoughts of death, in setting of school-related stressors. Pt with passive SI but denies any active SI/intent/plan/research/preparation. Pt is able to safety plan. Endorses reasons for living. However, pt is not future oriented at this time. She is connected to care at [facility]. Per collateral, there is concern re pts acute worsening of mood symptoms and inability to care for ADLs. Pt is requesting voluntary hospitalization. Patient and family in agreement with plan.     Plan:  -admit on 9.13 pending bed   -hold in ED pending bed  -reassess in morning pending bed  - pt on OCP to be restarted on unit- unclear if formulary: Tri-lo-sprintec

## 2023-12-13 NOTE — BH SAFETY PLAN - SUICIDE PREVENTION LIFELINE PHONES
Suicide Prevention Lifeline Phone: [phone] TALK ([phone]) Suicide Prevention Lifeline Phone: [phone] [phone] ([phone])

## 2023-12-13 NOTE — ED PROVIDER NOTE - PROGRESS NOTE DETAILS
NP Bereczky- ua appears contaminated, pt denies urinary symptoms, urgency, frequencies, painful urination or vaginal discharge. Will resend clean catch urine, no tx need it at this time.

## 2023-12-13 NOTE — ED BEHAVIORAL HEALTH ASSESSMENT NOTE - DESCRIPTION
single, college student, lives w/ parents No PO or IM PRNs for agitation required.     Vital Signs Last 24 Hrs  T(C): 36.9 (13 Dec 2023 21:04), Max: 36.9 (13 Dec 2023 21:04)  T(F): 98.5 (13 Dec 2023 21:04), Max: 98.5 (13 Dec 2023 21:04)  HR: 86 (13 Dec 2023 21:04) (86 - 86)  BP: 122/83 (13 Dec 2023 21:04) (122/83 - 127/82)  BP(mean): --  RR: 16 (13 Dec 2023 21:04) (16 - 18)  SpO2: 100% (13 Dec 2023 21:04) (98% - 100%)    Parameters below as of 13 Dec 2023 21:04  Patient On (Oxygen Delivery Method): room air PCOS

## 2023-12-13 NOTE — ED BEHAVIORAL HEALTH ASSESSMENT NOTE - RISK ASSESSMENT
Acute risk factors include hopelessness, passive SI. Chronic risk factors include history of psychiatric illness, history of prior hospitalization, history of poor coping skills. Protective factors include no active SI, no history SA, in treatment, future oriented, supportive family.

## 2023-12-13 NOTE — ED BEHAVIORAL HEALTH ASSESSMENT NOTE - PRIMARY DX
Medical Necessity Information: It is in your best interest to select a reason for this procedure from the list below. All of these items fulfill various CMS LCD requirements except the new and changing color options. Medical Necessity Clause: This procedure was medically necessary because the lesion that was treated was: Lab: 6 Lab Facility: 3 Detail Level: Detailed Was A Bandage Applied: Yes Size Of Lesion In Cm (Required): 0.6 [[id number]] X Size Of Lesion In Cm (Optional): 0 Depth Of Shave: dermis Biopsy Method: Dermablade Anesthesia Type: 1% lidocaine with epinephrine Hemostasis: Electrocautery and TCA 35% Wound Care: Petrolatum Path Notes (To The Dermatopathologist): Pt declined path, signed consent Render Path Notes In Note?: No Consent was obtained from the patient. The risks and benefits to therapy were discussed in detail. Specifically, the risks of infection, scarring, bleeding, prolonged wound healing, incomplete removal, allergy to anesthesia, nerve injury and recurrence were addressed. Prior to the procedure, the treatment site was clearly identified and confirmed by the patient. All components of Universal Protocol/PAUSE Rule completed. Post-Care Instructions: I reviewed with the patient in detail post-care instructions. Patient is to keep the biopsy site dry overnight, and then apply bacitracin twice daily until healed. Patient may apply hydrogen peroxide soaks to remove any crusting. Notification Instructions: Patient will be notified of pathology results. However, patient instructed to call the office if not contacted within 2 weeks. Billing Type: Third-Party Bill Major depression

## 2023-12-13 NOTE — ED PROVIDER NOTE - OBJECTIVE STATEMENT
This is a 23-year-old female past medical history PCOS (not on med),  past psychiatric history anxiety depression with complaint of increased depression. Endorses she is not functioning well oversleeping and overeating, she had to withdraw from college last week because she felt very overwhelmed and were not able to complete her assignments. Endorses her psychiatrist started her on Seroquel 200 mg at the nighttime for the last 3 weeks but believes medication is not helping her. Taking Effexor 75 mg daily. Reports previous psychiatric inpatient hospitalization in 2019 for severe depression. Patient endorses suicidal thoughts in the context of passive ideation, "she  wish she would not be here" but would not kill herself, protective factors her family's Sikhism believes.  She is  seeking for inpatient psychiatric treatment.   father Mo

## 2023-12-13 NOTE — ED BEHAVIORAL HEALTH ASSESSMENT NOTE - NSBHMSERECMEM_PSY_A_CORE
Normal Complex Repair And Skin Substitute Graft Text: The defect edges were debeveled with a #15 scalpel blade.  The primary defect was closed partially with a complex linear closure.  Given the location of the remaining defect, shape of the defect and the proximity to free margins a skin substitute graft was deemed most appropriate to repair the remaining defect.  The graft was trimmed to fit the size of the remaining defect.  The graft was then placed in the primary defect, oriented appropriately, and sutured into place.

## 2023-12-14 DIAGNOSIS — F40.10 SOCIAL PHOBIA, UNSPECIFIED: ICD-10-CM

## 2023-12-14 DIAGNOSIS — F32.9 MAJOR DEPRESSIVE DISORDER, SINGLE EPISODE, UNSPECIFIED: ICD-10-CM

## 2023-12-14 DIAGNOSIS — F41.1 GENERALIZED ANXIETY DISORDER: ICD-10-CM

## 2023-12-14 LAB
PCP SPEC-MCNC: SIGNIFICANT CHANGE UP
PCP SPEC-MCNC: SIGNIFICANT CHANGE UP

## 2023-12-14 PROCEDURE — 99222 1ST HOSP IP/OBS MODERATE 55: CPT

## 2023-12-14 RX ORDER — QUETIAPINE FUMARATE 200 MG/1
150 TABLET, FILM COATED ORAL AT BEDTIME
Refills: 0 | Status: DISCONTINUED | OUTPATIENT
Start: 2023-12-14 | End: 2024-01-03

## 2023-12-14 RX ORDER — VENLAFAXINE HCL 75 MG
75 CAPSULE, EXT RELEASE 24 HR ORAL DAILY
Refills: 0 | Status: DISCONTINUED | OUTPATIENT
Start: 2023-12-14 | End: 2023-12-15

## 2023-12-14 RX ORDER — ACETAMINOPHEN 500 MG
650 TABLET ORAL EVERY 6 HOURS
Refills: 0 | Status: DISCONTINUED | OUTPATIENT
Start: 2023-12-14 | End: 2024-01-08

## 2023-12-14 RX ORDER — HYDROXYZINE HCL 10 MG
25 TABLET ORAL EVERY 6 HOURS
Refills: 0 | Status: DISCONTINUED | OUTPATIENT
Start: 2023-12-14 | End: 2024-01-08

## 2023-12-14 RX ORDER — CLONAZEPAM 1 MG
1 TABLET ORAL DAILY
Refills: 0 | Status: DISCONTINUED | OUTPATIENT
Start: 2023-12-14 | End: 2023-12-20

## 2023-12-14 RX ADMIN — QUETIAPINE FUMARATE 150 MILLIGRAM(S): 200 TABLET, FILM COATED ORAL at 20:35

## 2023-12-14 RX ADMIN — Medication 25 MILLIGRAM(S): at 22:12

## 2023-12-14 NOTE — ED ADULT NURSE REASSESSMENT NOTE - NS ED NURSE REASSESS COMMENT FT1
Pt calm and cooperative resting comfortably in bed. No complaints of chest pain, headache, nausea, dizziness, vomiting  SOB, fever, chills verbalized. pt awaiting transfer to [facility] Pt calm and cooperative resting comfortably in bed. No complaints of chest pain, headache, nausea, dizziness, vomiting  SOB, fever, chills verbalized. pt awaiting transfer to [facility]

## 2023-12-14 NOTE — BH INPATIENT PSYCHIATRY ASSESSMENT NOTE - NSBHMETABOLIC_PSY_ALL_CORE_FT
BMI:   HbA1c:   Glucose:   BP: 108/72 (12-14-23 @ 07:07) (108/72 - 127/82)Vital Signs Last 24 Hrs  T(C): 36.8 (12-14-23 @ 07:07), Max: 36.9 (12-13-23 @ 21:04)  T(F): 98.3 (12-14-23 @ 07:07), Max: 98.5 (12-13-23 @ 21:04)  HR: 94 (12-14-23 @ 07:07) (86 - 94)  BP: 108/72 (12-14-23 @ 07:07) (108/72 - 122/83)  BP(mean): --  RR: 16 (12-14-23 @ 07:07) (16 - 16)  SpO2: 100% (12-14-23 @ 07:07) (100% - 100%)      Lipid Panel:  BMI:   HbA1c:   Glucose:   BP: 108/72 (12-14-23 @ 07:07) (108/72 - 127/82)Vital Signs Last 24 Hrs  T(C): 37.3 (12-14-23 @ 20:49), Max: 37.3 (12-14-23 @ 20:49)  T(F): 99.1 (12-14-23 @ 20:49), Max: 99.1 (12-14-23 @ 20:49)  HR: 94 (12-14-23 @ 07:07) (89 - 94)  BP: 108/72 (12-14-23 @ 07:07) (108/72 - 116/76)  BP(mean): --  RR: 16 (12-14-23 @ 07:07) (16 - 16)  SpO2: 100% (12-14-23 @ 07:07) (100% - 100%)      Lipid Panel:

## 2023-12-14 NOTE — ED BEHAVIORAL HEALTH PROGRESS NOTE - NSBHATTESTCOMMENTATTENDFT_PSY_A_CORE
Patient is a 23F, college student, single, domiciled with parents and sister with a PPHX of MDD, GEORGE, social anxiety disorder, avoidant personality disorder, currently in treatment at [facility], 1 psych hosp (11/7-11/15/19, for depression and poor affect regulation), no history of SA or NSSIB, no history of violence/aggression/legal issues, no history of substance use, PMH sig for PCOS, who presents to ED BIB by father for low mood and passive thoughts of death in setting of school-related stressors.     Patient remains tearful.  States that she feels guilt and depression.  Patient continues to be agreeable with plans for voluntary admission to monitor symptoms and titrate medication.   Patient is a 23F, college student, single, domiciled with parents and sister with a PPHX of MDD, GEORGE, social anxiety disorder, avoidant personality disorder, currently in treatment at [facility], 1 psych hosp (11/7-11/15/19, for depression and poor affect regulation), no history of SA or NSSIB, no history of violence/aggression/legal issues, no history of substance use, PMH sig for PCOS, who presents to ED BIB by father for low mood and passive thoughts of death in setting of school-related stressors.     Patient remains tearful.  States that she feels guilt and depression.  Patient continues to be agreeable with plans for voluntary admission to monitor symptoms and titrate medication.

## 2023-12-14 NOTE — BH PATIENT PROFILE - FALL HARM RISK - UNIVERSAL INTERVENTIONS
Bed in lowest position, wheels locked, appropriate side rails in place/Call bell, personal items and telephone in reach/Instruct patient to call for assistance before getting out of bed or chair/Non-slip footwear when patient is out of bed/[place] to call system/Physically safe environment - no spills, clutter or unnecessary equipment/Purposeful Proactive Rounding/Room/bathroom lighting operational, light cord in reach Bed in lowest position, wheels locked, appropriate side rails in place/Call bell, personal items and telephone in reach/Instruct patient to call for assistance before getting out of bed or chair/Non-slip footwear when patient is out of bed/[place] to call system/Physically safe environment - no spills, clutter or unnecessary equipment/Purposeful Proactive Rounding/Room/bathroom lighting operational, light cord in reach

## 2023-12-14 NOTE — BH INPATIENT PSYCHIATRY ASSESSMENT NOTE - DETAILS
maternal grandmother and mother with anxiety; no FHx of SA or substance use disorders passive thoughts of death

## 2023-12-14 NOTE — BH INPATIENT PSYCHIATRY ASSESSMENT NOTE - NSBHASSESSSUMMFT_PSY_ALL_CORE
Assessment:    23F, college student at [employer] (recent withdrawal), single, domiciled with parents and sister with a PPHX of MDD, GEORGE, social anxiety disorder, currently in treatment at [facility], 1 psych hosp (11/7-11/15/19, for depression and poor affect regulation), no history of SA or NSSIB, no history of violence/aggression/legal issues, no history of substance use, PMH sig for PCOS, who presents to ED BIB by father and at recommendation of psychiatrist for low mood and passive thoughts of death in setting of school-related stressors.    Ddx MDD recurrent, severe without psychotic features, GEORGE, Social Anxiety D/O, r/o personality disorder    Today pt reports worsening sad mood, amotivation, anhedonia, inc sleep, low enery, dec self care, hopelessness, guilt, worthlessness and extensive passive SI asa well as acute anxiety in the context of mental health instigated academic struggles and poor emotion regulation.    Plan:  1.	Legal: continue 9.13 admission  2.	Safety: routine obs appropriate as pt denying active SI/HI; Haldol/Ativan/Benadryl PRN agitation  3.	Psychiatric: continue home meds for now pending collateral  -Effexor XR 75 mg daily  -Seroquel 150 mg HS  -Clonazepam 1 mg daily  4.	I/G/M therapy with DBT  5.	Medical: no acute issues  6.	Collateral/Dispo:   -expand collateral from Dad and Md  -dispo when stable   Assessment:    23F, college student at [employer] (recent withdrawal), single, domiciled with parents and sister with a PPHX of MDD, GEORGE, social anxiety disorder, currently in treatment at [facility], 1 psych hosp (11/7-11/15/19, for depression and poor affect regulation), no history of SA or NSSIB, no history of violence/aggression/legal issues, no history of substance use, PMH sig for PCOS, who presents to ED BIB by father and at recommendation of psychiatrist for low mood and passive thoughts of death in setting of school-related stressors.    Ddx MDD recurrent, severe without psychotic features, GEORGE, Social Anxiety D/O, r/o personality disorder    Today pt reports worsening sad mood, amotivation, anhedonia, inc sleep, low enery, dec self care, hopelessness, guilt, worthlessness and extensive passive SI asa well as acute anxiety in the context of mental health instigated academic struggles and poor emotion regulation.    Plan:  1.	Legal: continue 9.13 admission  2.	Safety: routine obs appropriate as pt denying active SI/HI; Haldol/Ativan/Benadryl PRN agitation  3.	Psychiatric: continue home meds for now pending collateral  -Effexor XR 75 mg daily  -Seroquel 150 mg HS  -Clonazepam 1 mg daily  4.	I/G/M therapy with DBT  5.	Medical: no acute issues  6.	Collateral/Dispo:   -expand collateral from Dad and Md  -dispo when stable

## 2023-12-14 NOTE — ED BEHAVIORAL HEALTH PROGRESS NOTE - BILLING CODES
[id number] hospital care - moderate complexity 30-39 minutes [id number] hospital care - moderate complexity 30-39 minutes

## 2023-12-14 NOTE — ED ADULT NURSE NOTE - DOES PATIENT HAVE ADVANCE DIRECTIVE
This Ambassador spoke with patient who does not decline services. States she may have had some misunderstanding. This ambassador explained what PT and OT does in the home environment. Patient states she does want services. Team notified to proceed with scheduling.     No

## 2023-12-14 NOTE — BH INPATIENT PSYCHIATRY ASSESSMENT NOTE - MSE UNSTRUCTURED FT
The patient appears stated age, fair hygiene and dressed appropriately.  The patient was calm, mostly cooperative with the interview and maintained avoidant eye contact and distant relatedness.  No psychomotor agitation or retardation noted, no abnormal movements.  Steady gait observed.  The patient's speech was fluent, normal in tone, rate, and volume.  The patient's mood is "sad."  Affect is dysphoric, tearful, stable and appropriate.  Thought process is goal directed. Thought content notable for depression, functional impairment, hopelessness. No delusional content.  Reports passive suicidal ideation, no active SI/plan/intent, Denies homicidal ideation, intent, or plan. Denies hallucinations.  Cognition AAOx3. Insight is limited.  Judgment is limited.  Impulse control has been fair on the unit.

## 2023-12-14 NOTE — BH PATIENT PROFILE - HOME MEDICATIONS
LORazepam 0.5 mg oral tablet , 1 tab(s) orally once a day, As Needed  freetext medication     - , Estarylla 1 tab(s) orally once a day for PCOS  propranolol 10 mg oral tablet , 1 tab(s) orally once a day  venlafaxine 150 mg oral capsule, extended release , 2 cap(s) orally once a day  mirtazapine 15 mg oral tablet , 1 tab(s) orally once a day (at bedtime)

## 2023-12-14 NOTE — BH INPATIENT PSYCHIATRY ASSESSMENT NOTE - PERSONAL COLLATERAL RELATIONSHIP
Stable on nasal cannula at 1 LPM, 30% FiO2. Currently on lasix every other day.   Plan: Follow clinically. Continue current support, wean as tolerated. CBGs PRN. Continue lasix per Dr. Ruano.   Father

## 2023-12-14 NOTE — ED BEHAVIORAL HEALTH NOTE - BEHAVIORAL HEALTH NOTE
Writer contacted Mo Bartlett [phone] (Father) and informed of patient admission to [facility]. Writer contacted Mo Bratlett [phone] (Father) and informed of patient admission to [facility].

## 2023-12-14 NOTE — BH INPATIENT PSYCHIATRY ASSESSMENT NOTE - OTHER PAST PSYCHIATRIC HISTORY (INCLUDE DETAILS REGARDING ONSET, COURSE OF ILLNESS, INPATIENT/OUTPATIENT TREATMENT)
This is a 84 y/o male with PMH CVA 2015, bladder Ca, CAD s/p CABG x4, CHF who is POD #1 from a [place] ICD, no immediate complications noted. On tele review patietn noted to have Aflutter, new onset. MDD, GEORGE, SAD, APD?  1 prior hospitalization at [facility]S in Nov 2019-presented with irritability, anxiety, dysrgulation, anhedonia, hypersomnia, dec appetite and weight loss and passive SI. Was stabilized at that time on propranolol 10 mg daily, remeron 15 mg HS, Effexor  mg daily and ativan 0.5 mg PRN  Fall 2019 completed [facility] PHP, then transition to treatment at [facility] BHCP    h/o SI but no h/o SA or NSSIB MDD, GEORGE, SAD, APD?  1 prior hospitalization at [facility] 1S in Nov 2019-presented with irritability, anxiety, dysrgulation, anhedonia, hypersomnia, dec appetite and weight loss and passive SI. Was stabilized at that time on propranolol 10 mg daily, remeron 15 mg HS, Effexor  mg daily and ativan 0.5 mg PRN  Fall 2019 completed [facility] PHP, then transition to treatment at [facility] BHCP    h/o SI but no h/o SA or NSSIB

## 2023-12-14 NOTE — BH INPATIENT PSYCHIATRY ASSESSMENT NOTE - CURRENT MEDICATION
MEDICATIONS  (STANDING):  clonazePAM  Tablet 1 milliGRAM(s) Oral daily  QUEtiapine 150 milliGRAM(s) Oral at bedtime  venlafaxine XR 75 milliGRAM(s) Oral daily    MEDICATIONS  (PRN):  acetaminophen     Tablet .. 650 milliGRAM(s) Oral every 6 hours PRN Mild Pain (1 - 3), Moderate Pain (4 - 6)  diphenhydrAMINE 50 milliGRAM(s) Oral every 6 hours PRN agitation  diphenhydrAMINE Injectable 50 milliGRAM(s) IntraMuscular once PRN agitation  haloperidol     Tablet 5 milliGRAM(s) Oral every 6 hours PRN agitation  haloperidol    Injectable 5 milliGRAM(s) IntraMuscular once PRN Agitation  hydrOXYzine hydrochloride 25 milliGRAM(s) Oral every 6 hours PRN anxiety  LORazepam     Tablet 2 milliGRAM(s) Oral every 6 hours PRN Agitation  LORazepam   Injectable 2 milliGRAM(s) IntraMuscular Once PRN Agitation

## 2023-12-14 NOTE — ED BEHAVIORAL HEALTH PROGRESS NOTE - NSBHATTESTTYPEVISIT_PSY_A_CORE
Attending evaluating patient with EARL ([id number] code) Attending evaluating patient with EARL ([id number] code)

## 2023-12-14 NOTE — ED BEHAVIORAL HEALTH PROGRESS NOTE - PSYCHIATRIC ISSUES AND PLAN (INCLUDE STANDING AND PRN MEDICATION)
agitation: Haldol 5 mg PO/IM, Ativan 2 mg PO/IM, Benadryl 50 mg PO/IM q6prn; continue Effexor XR 75mg daily- pt in midst of tapering, changes to be made by primary team agitation: Haldol 5 mg PO/IM, Ativan 2 mg PO/IM, Benadryl 50 mg PO/IM q6prn; continue Effexor XR 75mg QHS- pt in midst of tapering, changes to be made by primary team, Continue Seroquel 150mg QHS, Klonopin 1mg QHS

## 2023-12-14 NOTE — BH INPATIENT PSYCHIATRY ASSESSMENT NOTE - NSBHCHARTREVIEWVS_PSY_A_CORE FT
Vital Signs Last 24 Hrs  T(C): 36.8 (12-14-23 @ 07:07), Max: 36.9 (12-13-23 @ 21:04)  T(F): 98.3 (12-14-23 @ 07:07), Max: 98.5 (12-13-23 @ 21:04)  HR: 94 (12-14-23 @ 07:07) (86 - 94)  BP: 108/72 (12-14-23 @ 07:07) (108/72 - 122/83)  BP(mean): --  RR: 16 (12-14-23 @ 07:07) (16 - 16)  SpO2: 100% (12-14-23 @ 07:07) (100% - 100%)     Vital Signs Last 24 Hrs  T(C): 37.3 (12-14-23 @ 20:49), Max: 37.3 (12-14-23 @ 20:49)  T(F): 99.1 (12-14-23 @ 20:49), Max: 99.1 (12-14-23 @ 20:49)  HR: 94 (12-14-23 @ 07:07) (89 - 94)  BP: 108/72 (12-14-23 @ 07:07) (108/72 - 116/76)  BP(mean): --  RR: 16 (12-14-23 @ 07:07) (16 - 16)  SpO2: 100% (12-14-23 @ 07:07) (100% - 100%)

## 2023-12-14 NOTE — BH INPATIENT PSYCHIATRY ASSESSMENT NOTE - DESCRIPTION
single, college student at [employer], lives w/ parents and younger sister in [place]. Dad is primary support. single, college student at [employer], lives w/ parents and younger sister in [place]. Dad is primary support.

## 2023-12-14 NOTE — ED ADULT NURSE REASSESSMENT NOTE - AS TEMP SITE
HPI:   Venice Gaffney is a 35 year old who presents for:    Chief Complaint   Patient presents with     Anxiety     follow-up     Tinnitus     follow-up, doing alot better, today there is not ringing     Medication Reconciliation     Anxiety: She initiated Zoloft at 25 mg daily 2 weeks ago.  No noted side effects.  Anxiety symptoms have improved slightly which she attributes to getting better sleep.  She took Klonopin each evening for 10 days as recommended when she initiated Zoloft, and is now using 25 mg of Vistaril at night.  She did not use any Klonopin during the day. SANJUANA 7 score today is 11 (down from 17 2 weeks ago).  No thoughts of being better off dead or suicidal ideation.    PHQ-9 score is 11.    Her tinnitus has improved over the past week.  She is not bothered with ear fullness as much she was 2 weeks ago.  No other upper respiratory symptoms.           PMHX:     Patient Active Problem List   Diagnosis     Migraines     Anxiety with flying     Smoker       Current Outpatient Medications   Medication Sig Dispense Refill     clonazePAM (KLONOPIN) 1 MG tablet Take 1 tablet (1 mg) by mouth daily as needed for anxiety 20 tablet 0     hydrOXYzine (VISTARIL) 25 MG capsule Take 2 capsules (50 mg) by mouth every 12 hours as needed for anxiety 60 capsule 1     loratadine (CLARITIN) 10 MG tablet Take 1 tablet (10 mg) by mouth daily       sertraline (ZOLOFT) 50 MG tablet Take 1 tablet (50 mg) by mouth daily 30 tablet 1       Social History     Tobacco Use     Smoking status: Current Every Day Smoker     Packs/day: 1.00     Years: 9.00     Pack years: 9.00     Types: Other     Smokeless tobacco: Never Used     Tobacco comment: Vaping   Substance Use Topics     Alcohol use: Yes     Alcohol/week: 0.8 standard drinks     Types: 1 Standard drinks or equivalent per week     Comment: 1-2 times per month     Drug use: No       Social History     Social History Narrative     Not on file       Allergies   Allergen  "Reactions     Cat Hair Extract      Other reaction(s): Sneezing     Demerol [Meperidine]      Passing out     Imitrex [Sumatriptan]      Fish Allergy Rash       No results found for this or any previous visit (from the past 24 hour(s)).         Review of Systems:     General: No recent illness  ENT: Ear fullness, tinnitus improving  Psych: Difficulty with concentration           Physical Exam:     Vitals:    04/12/21 1648   BP: 114/78   BP Location: Right arm   Cuff Size: Adult Regular   Pulse: 79   Resp: 16   Temp: 98.2  F (36.8  C)   TempSrc: Oral   SpO2: 99%   Weight: 80.7 kg (178 lb)   Height: 1.549 m (5' 1\")     Body mass index is 33.63 kg/m .    General: Alert,   in no acute distress  HEENT: Head is free of trauma.   Sclerae non-icteric. PERRL, Moist oral mucus membranes  Resp: Clear to auscultation bilaterally  CV: Regular rate and rhythm  Skin: exposed skin free of rash  Psych: Mood appropriate       Assessment and Plan   1.  Generalized anxiety disorder    Has tolerated 2 weeks of sertraline 25 mg daily, increased to 50 mg daily  Continue hydroxyzine 25 mg each evening for sleep for the next 2 weeks, then return only as needed use    We again discussed common side effects of sertraline and Vistaril, and rare but serious symptoms such as suicidal ideation and allergic reaction.      - hydrOXYzine (VISTARIL) 25 MG capsule; Take 2 capsules (50 mg) by mouth every 12 hours as needed for anxiety  Dispense: 60 capsule; Refill: 1  - sertraline (ZOLOFT) 50 MG tablet; Take 1 tablet (50 mg) by mouth daily  Dispense: 30 tablet; Refill: 1      Follow up: 4 weeks, earlier if needed  Options for treatment and follow-up care were reviewed with the patient and/or guardian. Venice Gaffney and/or guardian engaged in the decision making process and verbalized understanding of the options discussed and agreed with the final plan.    Jesus Cruz MD  Faculty - [facility]                      " oral

## 2023-12-14 NOTE — ED BEHAVIORAL HEALTH PROGRESS NOTE - SUMMARY
23F, college student, single, domiciled with parents and sister with a PPHX of MDD, GEORGE, social anxiety disorder, avoidant personality disorder, currently in treatment at [facility], 1 psych hosp (11/7-11/15/19, for depression and poor affect regulation), no history of SA or NSSIB, no history of violence/aggression/legal issues, no history of substance use, PMH sig for PCOS, who presents to ED BIB by father for low mood and passive thoughts of death in setting of school-related stressors.     Patient presents w/ affective symptoms, including low mood, hopelessness, anhedonia, and passive thoughts of death, in setting of school-related stressors. Pt with passive SI but denies any active SI/intent/plan/research/preparation. Pt is able to safety plan. Endorses reasons for living. However, pt is not future oriented at this time. She is connected to care at [facility]. Per collateral, there is concern re pts acute worsening of mood symptoms and inability to care for ADLs. Pt is requesting voluntary hospitalization. Patient and family in agreement with plan.     Plan:  -admit on 9.13 pending bed   -hold in ED pending bed  -reassess in morning pending bed  - pt on OCP to be restarted on unit- unclear if formulary: [employer] 23F, college student, single, domiciled with parents and sister with a PPHX of MDD, GEORGE, social anxiety disorder, avoidant personality disorder, currently in treatment at [facility], 1 psych hosp (11/7-11/15/19, for depression and poor affect regulation), no history of SA or NSSIB, no history of violence/aggression/legal issues, no history of substance use, PMH sig for PCOS, who presents to ED BIB by father for low mood and passive thoughts of death in setting of school-related stressors.     Patient presents w/ affective symptoms, including low mood, hopelessness, anhedonia, and passive thoughts of death, in setting of school-related stressors. Pt with passive SI but denies any active SI/intent/plan/research/preparation. Pt is able to safety plan. Endorses reasons for living. However, pt is not future oriented at this time. She is connected to care at [facility]. Per collateral, there is concern re pts acute worsening of mood symptoms and inability to care for ADLs. Pt is requesting voluntary hospitalization. Patient and family in agreement with plan.     Plan:  -admit on 9.13 pending bed   -hold in ED pending bed  -reassess in morning pending bed  - pt on OCP to be restarted on unit- unclear if formulary: [facility]

## 2023-12-14 NOTE — ED BEHAVIORAL HEALTH PROGRESS NOTE - DETAILS:
Met with patient at bedside. She reports she came to the ED because her psychiatrist and father recommended she come. She stated she withdrew from school last week at [employer] due to worsening concentration related to depression/anxiety. She stated over the last few days she has been unable to function. She stated over the last few days she has been laying in bed, unable to function, not showering, not eating and just laying in bed trying to sleep. She stated she wishes she was dead and has been feeling this way x 1 week. Patient denies active suicidal ideation/plan/intent. No manic/hypomanic or psychotic symptoms.     Patient provided therapeutic support with evaluator and with attending Dr. Jason. She verbalized her continued agreement to voluntary inpatient admission.  Met with patient at bedside. She reports she came to the ED because her psychiatrist and father recommended she come. She stated she withdrew from school last week at [employer] due to worsening concentration related to depression/anxiety. She stated over the last few days she has been unable to function. She stated over the last few days she has been laying in bed, unable to function, not showering, not eating and just laying in bed trying to sleep. She stated she wishes she was dead and has been feeling this way x 1 week. Patient denies active suicidal ideation/plan/intent. No manic/hypomanic or psychotic symptoms.     Patient provided therapeutic support with evaluator and with attending Dr. Jason. She verbalized her continued agreement to voluntary inpatient admission.

## 2023-12-14 NOTE — BH INPATIENT PSYCHIATRY ASSESSMENT NOTE - RISK ASSESSMENT
Acute risk factors include hopelessness, passive SI. Chronic risk factors include history of psychiatric illness, history of prior hospitalization, history of poor coping skills. Protective factors include no active SI, no history SA, in treatment, future oriented, supportive family. The patient is at chronic risk of suicide given history of affective illness and anxiety, history of prior hospitalization, history of SI, family history of mental illness. Protective factors include no history of SA, no history of NSSIB, no substance use, in treatment, supportive family.    Acutely patient is depressed, extremely hopeless and anhedonic, withdrawing from life, irritable and dysregulated and not caring for self. she has passive SI. She therefore is at acutely elevated risk for self harm and inability to care for self and requires continued hospitalization for safety and stabilization.

## 2023-12-14 NOTE — BH INPATIENT PSYCHIATRY ASSESSMENT NOTE - NSSUICPROTFACT_PSY_ALL_CORE
Responsibility to children, family, or others/Identifies reasons for living/Supportive social network of family or friends/Fear of death or the actual act of killing self/Engaged in work or school/Positive therapeutic relationships/Jainism beliefs Responsibility to children, family, or others/Identifies reasons for living/Supportive social network of family or friends/Fear of death or the actual act of killing self/Engaged in work or school/Positive therapeutic relationships/Rastafarian beliefs

## 2023-12-14 NOTE — ED ADULT NURSE REASSESSMENT NOTE - NS ED NURSE REASSESS COMMENT FT1
Break RN: Pt resting in stretcher, offers no complaints. Respirations equal and unlabored. Pt calm and cooperative at this time. Vitals performed. No acute distress noted. Safety maintained.

## 2023-12-14 NOTE — BH INPATIENT PSYCHIATRY ASSESSMENT NOTE - HPI (INCLUDE ILLNESS QUALITY, SEVERITY, DURATION, TIMING, CONTEXT, MODIFYING FACTORS, ASSOCIATED SIGNS AND SYMPTOMS)
23F, college student at [employer] (recent withdrawal), single, domiciled with parents and sister with a PPHX of MDD, GEORGE, social anxiety disorder, currently in treatment at [facility], 1 psych hosp (11/7-11/15/19, for depression and poor affect regulation), no history of SA or NSSIB, no history of violence/aggression/legal issues, no history of substance use, PMH sig for PCOS, who presents to ED BIB by father and at recommendation of psychiatrist for low mood and passive thoughts of death in setting of school-related stressors.     Patient is slow to warm up on interview on 1S, expressing feeling like there is nothing anyone can do to help her. She reports long standing struggles with depression and anxiety since middle school with social anxiety leading to social isolation, irritability and conflict with family. Over the past few weeks she reports having worsening anxiety causing difficulty completing school assignments on time, increased mood lability and irritability and increased feelings that she is a failure and burden on her family. Reports feeling sad, amotivated, with hypersomnia and low energy. Also reports anhedonia, intermittent poor focus (often stuck on negative thoughts that distract her), hopelessness, guilt, and worthlessness. Denies appetite changes. Has been feeling increasingly passively suicidal for the past month, with belief that family would be better if she was dead. Denies any active SI, reporting the only reason she does not allow her thoughts to progress to active or planning is that she believes she would go to hell if she completed suicide. Also experiencing anxiety a/w chest pain, racing thoughts, worry in many areas (finances, dog, school, perception by others, future) and muscle tension. In the period leading to admission she ended up withdrawing from 3 classes as she felt she would not be able to complete the assignments and as a result fail the class. Had plans to graduate in May 2024 and feels this will no longer happen-deeming her a failure. These feelings led to increased isolation and time in bed-prompting concern from psychiatrist and father and leading to ED presentation and hospitalization.     On ROS denies AVH. At times thinks professors are trying to take advantage of their power. She reads into people's expressions to find evidence to support her belief that they do not like her. Denies ideas of reference, TW/TI/TB, roberto delusions, VI/HI. No s/s eric. Reports compliance with medication regimen but is unsure whether it is helping her at all.    Per ED  Assessment Note documented 12/13/23:  "Calm and cooperative with interview. Neutral affect, linear.   Pt reports months long hx of low mood with worsening over past three days. Trigger unknown but of note pt withdrew from all her college courses 1 week ago in setting of "I wasn't doing my work and scared of failing". Over the past 3 days, reports hypersomnia, not wanting to get out of bed, decreased attn to ADLs (not showering or brushing teeth), anhedonia, hopelessness and worthlessness. No changes in appetite. Pt endorses passive SI, "my parents and sister will be better off without me", but denies intent or plan. No preparatory acts. States passive SI is chornic. No hx of SA. No NSSIB. Patient is interested in voluntary admission. Pt with one previous admission in 2019 for similar presentation. Denies psychotic symptoms, including paranoia, AH, IOR, MR, TI, TW, TB. No manic symptoms, including decreased need for sleep, persistently elevated and/or irritable mood, and increase in goal-directed behavior. No substance use. Currently in treatment with Dr. Sanders at [facility]. Adherent with treatment. Adherent with medications: effexor 75 mg, seroquel 150mg, klonopin 1 mg prn     Collateral information obtained from patient's father Mo Bartlett, see chart note.    Collateral obtained from Dr. Sanders- pt has been struggling with persistent mood symptoms and passive SI in context of school and family stressors. At most recent appointment, pt stated that they felt "they would lose control" and hospitalization was "coming". Dr. Sanders concerned for pt's ability to be managed at current level of care and is advocating for higher level of care. Pt medications are currently being titrated, last change was on 12/11. Has been considering switching medication for depression but has had difficulty with insurance. Pt has chronic SI but is able to safety plan. Last safety plan completed 11/13/23." 23F, college student at [employer] (recent withdrawal), single, domiciled with parents and sister with a PPHX of MDD, GEORGE, social anxiety disorder, currently in treatment at [facility], 1 psych hosp (11/7-11/15/19, for depression and poor affect regulation), no history of SA or NSSIB, no history of violence/aggression/legal issues, no history of substance use, PMH sig for PCOS, who presents to ED BIB by father and at recommendation of psychiatrist for low mood and passive thoughts of death in setting of school-related stressors.     Patient is slow to warm up on interview on 1S, expressing feeling like there is nothing anyone can do to help her. She reports long standing struggles with depression and anxiety since middle school with social anxiety leading to social isolation, irritability and conflict with family. Over the past few weeks she reports having worsening anxiety causing difficulty completing school assignments on time, increased mood lability and irritability and increased feelings that she is a failure and burden on her family. Reports feeling sad, amotivated, with hypersomnia and low energy. Also reports anhedonia, intermittent poor focus (often stuck on negative thoughts that distract her), hopelessness, guilt, and worthlessness. Denies appetite changes. Has been feeling increasingly passively suicidal for the past month, with belief that family would be better if she was dead. Denies any active SI, reporting the only reason she does not allow her thoughts to progress to active or planning is that she believes she would go to hell if she completed suicide. Also experiencing anxiety a/w chest pain, racing thoughts, worry in many areas (finances, dog, school, perception by others, future) and muscle tension. In the period leading to admission she ended up withdrawing from 3 classes as she felt she would not be able to complete the assignments and as a result fail the class. Had plans to graduate in May 2024 and feels this will no longer happen-deeming her a failure. These feelings led to increased isolation and time in bed-prompting concern from psychiatrist and father and leading to ED presentation and hospitalization.     On ROS denies AVH. At times thinks professors are trying to take advantage of their power. She reads into people's expressions to find evidence to support her belief that they do not like her. Denies ideas of reference, TW/TI/TB, roberto delusions, VI/HI. No s/s eric. Reports compliance with medication regimen but is unsure whether it is helping her at all.    Per ED  Assessment Note documented 12/13/23:  "Calm and cooperative with interview. Neutral affect, linear.   Pt reports months long hx of low mood with worsening over past three days. Trigger unknown but of note pt withdrew from all her college courses 1 week ago in setting of "I wasn't doing my work and scared of failing". Over the past 3 days, reports hypersomnia, not wanting to get out of bed, decreased attn to ADLs (not showering or brushing teeth), anhedonia, hopelessness and worthlessness. No changes in appetite. Pt endorses passive SI, "my parents and sister will be better off without me", but denies intent or plan. No preparatory acts. States passive SI is chornic. No hx of SA. No NSSIB. Patient is interested in voluntary admission. Pt with one previous admission in 2019 for similar presentation. Denies psychotic symptoms, including paranoia, AH, IOR, MR, TI, TW, TB. No manic symptoms, including decreased need for sleep, persistently elevated and/or irritable mood, and increase in goal-directed behavior. No substance use. Currently in treatment with Dr. Sanders at [facility]. Adherent with treatment. Adherent with medications: effexor 75 mg, seroquel 150mg, klonopin 1 mg prn     Collateral information obtained from patient's father Mo Bartlett, see chart note.    Collateral obtained from Dr. Sanders- pt has been struggling with persistent mood symptoms and passive SI in context of school and family stressors. At most recent appointment, pt stated that they felt "they would lose control" and hospitalization was "coming". Dr. Sanders concerned for pt's ability to be managed at current level of care and is advocating for higher level of care. Pt medications are currently being titrated, last change was on 12/11. Has been considering switching medication for depression but has had difficulty with insurance. Pt has chronic SI but is able to safety plan. Last safety plan completed 11/13/23."

## 2023-12-14 NOTE — ED BEHAVIORAL HEALTH PROGRESS NOTE - CASE SUMMARY/FORMULATION (CLEARLY DOCUMENT RATIONALE FOR DISPOSITION CHANGE)
23F, college student, single, domiciled with parents and sister with a PPHX of MDD, GEORGE, social anxiety disorder, avoidant personality disorder, currently in treatment at [facility], 1 psych hosp (11/7-11/15/19, for depression and poor affect regulation), no history of SA or NSSIB, no history of violence/aggression/legal issues, no history of substance use, PMH sig for PCOS, who presents to ED BIB by father for low mood and passive thoughts of death in setting of school-related stressors.     Patient continues to present acutely depressed with passive suicidal ideation. She is unable to function. Per collateral, there is concern re pts acute worsening of mood symptoms and inability to care for ADLs. Pt continues to request voluntary inpatient admission.    23F, college student, single, domiciled with parents and sister with a PPHX of MDD, GEORGE, social anxiety disorder, avoidant personality disorder, currently in treatment at [facility], 1 psych hosp (11/7-11/15/19, for depression and poor affect regulation), no history of SA or NSSIB, no history of violence/aggression/legal issues, no history of substance use, PMH sig for PCOS, who presents to ED BIB by father for low mood and passive thoughts of death in setting of school-related stressors.     Patient continues to present acutely depressed with passive suicidal ideation. She is unable to function. Per collateral, there is concern re pts acute worsening of mood symptoms and inability to care for ADLs. Pt continues to request voluntary inpatient admission.

## 2023-12-14 NOTE — ED BEHAVIORAL HEALTH NOTE - BEHAVIORAL HEALTH NOTE
Writer was informed patient requested to speak to social work.  patient is requesting discharge.  Writer informed provider.

## 2023-12-15 LAB
B PERT DNA SPEC QL NAA+PROBE: SIGNIFICANT CHANGE UP
B PERT DNA SPEC QL NAA+PROBE: SIGNIFICANT CHANGE UP
B PERT+PARAPERT DNA PNL SPEC NAA+PROBE: SIGNIFICANT CHANGE UP
B PERT+PARAPERT DNA PNL SPEC NAA+PROBE: SIGNIFICANT CHANGE UP
BORDETELLA PARAPERTUSSIS (RAPRVP): SIGNIFICANT CHANGE UP
BORDETELLA PARAPERTUSSIS (RAPRVP): SIGNIFICANT CHANGE UP
C PNEUM DNA SPEC QL NAA+PROBE: SIGNIFICANT CHANGE UP
C PNEUM DNA SPEC QL NAA+PROBE: SIGNIFICANT CHANGE UP
CULTURE RESULTS: SIGNIFICANT CHANGE UP
CULTURE RESULTS: SIGNIFICANT CHANGE UP
FLUAV SUBTYP SPEC NAA+PROBE: SIGNIFICANT CHANGE UP
FLUAV SUBTYP SPEC NAA+PROBE: SIGNIFICANT CHANGE UP
FLUBV RNA SPEC QL NAA+PROBE: SIGNIFICANT CHANGE UP
FLUBV RNA SPEC QL NAA+PROBE: SIGNIFICANT CHANGE UP
HADV DNA SPEC QL NAA+PROBE: SIGNIFICANT CHANGE UP
HADV DNA SPEC QL NAA+PROBE: SIGNIFICANT CHANGE UP
HCOV 229E RNA SPEC QL NAA+PROBE: SIGNIFICANT CHANGE UP
HCOV 229E RNA SPEC QL NAA+PROBE: SIGNIFICANT CHANGE UP
HCOV HKU1 RNA SPEC QL NAA+PROBE: SIGNIFICANT CHANGE UP
HCOV HKU1 RNA SPEC QL NAA+PROBE: SIGNIFICANT CHANGE UP
HCOV NL63 RNA SPEC QL NAA+PROBE: SIGNIFICANT CHANGE UP
HCOV NL63 RNA SPEC QL NAA+PROBE: SIGNIFICANT CHANGE UP
HCOV OC43 RNA SPEC QL NAA+PROBE: SIGNIFICANT CHANGE UP
HCOV OC43 RNA SPEC QL NAA+PROBE: SIGNIFICANT CHANGE UP
HMPV RNA SPEC QL NAA+PROBE: SIGNIFICANT CHANGE UP
HMPV RNA SPEC QL NAA+PROBE: SIGNIFICANT CHANGE UP
HPIV1 RNA SPEC QL NAA+PROBE: SIGNIFICANT CHANGE UP
HPIV1 RNA SPEC QL NAA+PROBE: SIGNIFICANT CHANGE UP
HPIV2 RNA SPEC QL NAA+PROBE: SIGNIFICANT CHANGE UP
HPIV2 RNA SPEC QL NAA+PROBE: SIGNIFICANT CHANGE UP
HPIV3 RNA SPEC QL NAA+PROBE: SIGNIFICANT CHANGE UP
HPIV3 RNA SPEC QL NAA+PROBE: SIGNIFICANT CHANGE UP
HPIV4 RNA SPEC QL NAA+PROBE: SIGNIFICANT CHANGE UP
HPIV4 RNA SPEC QL NAA+PROBE: SIGNIFICANT CHANGE UP
M PNEUMO DNA SPEC QL NAA+PROBE: SIGNIFICANT CHANGE UP
M PNEUMO DNA SPEC QL NAA+PROBE: SIGNIFICANT CHANGE UP
RAPID RVP RESULT: SIGNIFICANT CHANGE UP
RAPID RVP RESULT: SIGNIFICANT CHANGE UP
RSV RNA SPEC QL NAA+PROBE: SIGNIFICANT CHANGE UP
RSV RNA SPEC QL NAA+PROBE: SIGNIFICANT CHANGE UP
RV+EV RNA SPEC QL NAA+PROBE: SIGNIFICANT CHANGE UP
RV+EV RNA SPEC QL NAA+PROBE: SIGNIFICANT CHANGE UP
SARS-COV-2 RNA SPEC QL NAA+PROBE: SIGNIFICANT CHANGE UP
SARS-COV-2 RNA SPEC QL NAA+PROBE: SIGNIFICANT CHANGE UP
SPECIMEN SOURCE: SIGNIFICANT CHANGE UP
SPECIMEN SOURCE: SIGNIFICANT CHANGE UP

## 2023-12-15 PROCEDURE — 99233 SBSQ HOSP IP/OBS HIGH 50: CPT

## 2023-12-15 PROCEDURE — 90853 GROUP PSYCHOTHERAPY: CPT

## 2023-12-15 RX ORDER — GABAPENTIN 400 MG/1
100 CAPSULE ORAL
Refills: 0 | Status: DISCONTINUED | OUTPATIENT
Start: 2023-12-15 | End: 2023-12-27

## 2023-12-15 RX ORDER — VENLAFAXINE HCL 75 MG
37.5 CAPSULE, EXT RELEASE 24 HR ORAL DAILY
Refills: 0 | Status: DISCONTINUED | OUTPATIENT
Start: 2023-12-16 | End: 2023-12-19

## 2023-12-15 RX ORDER — DULOXETINE HYDROCHLORIDE 30 MG/1
20 CAPSULE, DELAYED RELEASE ORAL DAILY
Refills: 0 | Status: DISCONTINUED | OUTPATIENT
Start: 2023-12-15 | End: 2023-12-19

## 2023-12-15 RX ADMIN — Medication 25 MILLIGRAM(S): at 11:20

## 2023-12-15 RX ADMIN — Medication 25 MILLIGRAM(S): at 21:55

## 2023-12-15 RX ADMIN — Medication 75 MILLIGRAM(S): at 08:45

## 2023-12-15 RX ADMIN — QUETIAPINE FUMARATE 150 MILLIGRAM(S): 200 TABLET, FILM COATED ORAL at 20:55

## 2023-12-15 RX ADMIN — Medication 1 MILLIGRAM(S): at 08:45

## 2023-12-15 RX ADMIN — GABAPENTIN 100 MILLIGRAM(S): 400 CAPSULE ORAL at 20:55

## 2023-12-15 NOTE — PSYCHIATRIC REHAB INITIAL EVALUATION - NSBHEDUCURGRADE_PSY_ALL_CORE
Patient is currently attending [employer]/4 year college Patient is currently attending [employer]/4 year college

## 2023-12-15 NOTE — BH SOCIAL WORK INITIAL PSYCHOSOCIAL EVALUATION - OTHER PAST PSYCHIATRIC HISTORY (INCLUDE DETAILS REGARDING ONSET, COURSE OF ILLNESS, INPATIENT/OUTPATIENT TREATMENT)
MDD, GEORGE, SAD, APD  1 prior hospitalization  in treatment at [facility] MDD, GEORGE, SAD, APD  1 prior hospitalization  in treatment at [facility]

## 2023-12-15 NOTE — BH SOCIAL WORK INITIAL PSYCHOSOCIAL EVALUATION - NSBHFINANCECOPAY_PSY_ALL_CORE
[facility]    Returned patients call with   Unable to contact patient left voicemail to return call  Affordable

## 2023-12-15 NOTE — BH PSYCHOLOGY - GROUP THERAPY NOTE - NSPSYCHOLGRPDBTPT_PSY_A_CORE FT
DBT Group is a group in which patients learn skills to better manage their emotions and behaviors. Group today focused on the “mindfulness” module, which are skills to help patients increase their awareness of their present experience without judgment. Pts were taught the “what” skills (observe, describe, participate) and “how” skills (non-judgmentally, effectively, and one-mindfully) of mindfulness, and engaged in a variety of mindfulness exercises to practice the skills, and shared observations at the end of each activity.

## 2023-12-15 NOTE — BH INPATIENT PSYCHIATRY PROGRESS NOTE - NSBHASSESSSUMMFT_PSY_ALL_CORE
Assessment:    23F, college student at [employer] (recent withdrawal), single, domiciled with parents and sister with a PPHX of MDD, GEORGE, social anxiety disorder, currently in treatment at [facility], 1 psych hosp (11/7-11/15/19, for depression and poor affect regulation), no history of SA or NSSIB, no history of violence/aggression/legal issues, no history of substance use, PMH sig for PCOS, who presents to ED BIB by father and at recommendation of psychiatrist for low mood and passive thoughts of death in setting of school-related stressors.    Ddx MDD recurrent, severe without psychotic features, GEORGE, Social Anxiety D/O, r/o personality disorder    Today pt reports prominent anxiety with social themes, hopelessness, self-judgment, anhedonia, worthlessness, guilt and poor ability to regulate emotions. Denies passive SI and active SI today, tenuously engaged in tx.    Given significant continued anxiety and repression with predominant dysregulation, hopelessness and anhedonia pt requires continued hospitalization for safety and stabilization.    Plan:  1.	Legal: continue 9.13 admission  2.	Safety: routine obs appropriate as pt denying active SI/HI; Haldol/Ativan/Benadryl PRN agitation  3.	Psychiatric: continue home meds for now pending collateral  -cross titrate from Effexor to Cymbalta: decrease Effexor XR to 37.5 mg daily and start Cymbalta 20 mg daily  -add gabapentin 100 mg BID for anxiety for now  -continue Seroquel 150 mg HS for now  -Clonazepam 1 mg daily  4.	I/G/M therapy with DBT  5.	Medical: no acute issues  6.	Collateral/Dispo:   -obtained from Dad and psychiatrist  -dispo when stable-likely benefit from DBT referral   Assessment:    23F, college student at [employer] (recent withdrawal), single, domiciled with parents and sister with a PPHX of MDD, GEORGE, social anxiety disorder, currently in treatment at [facility], 1 psych hosp (11/7-11/15/19, for depression and poor affect regulation), no history of SA or NSSIB, no history of violence/aggression/legal issues, no history of substance use, PMH sig for PCOS, who presents to ED BIB by father and at recommendation of psychiatrist for low mood and passive thoughts of death in setting of school-related stressors.    Ddx MDD recurrent, severe without psychotic features, GEORGE, Social Anxiety D/O, r/o personality disorder    Today pt reports prominent anxiety with social themes, hopelessness, self-judgment, anhedonia, worthlessness, guilt and poor ability to regulate emotions. Denies passive SI and active SI today, tenuously engaged in tx.    Given significant continued anxiety and repression with predominant dysregulation, hopelessness and anhedonia pt requires continued hospitalization for safety and stabilization.    Plan:  1.	Legal: continue 9.13 admission  2.	Safety: routine obs appropriate as pt denying active SI/HI; Haldol/Ativan/Benadryl PRN agitation  3.	Psychiatric: continue home meds for now pending collateral  -cross titrate from Effexor to Cymbalta: decrease Effexor XR to 37.5 mg daily and start Cymbalta 20 mg daily  -add gabapentin 100 mg BID for anxiety for now  -continue Seroquel 150 mg HS for now  -Clonazepam 1 mg daily  4.	I/G/M therapy with DBT  5.	Medical: no acute issues  6.	Collateral/Dispo:   -obtained from Dad and psychiatrist  -dispo when stable-likely benefit from DBT referral

## 2023-12-15 NOTE — BH INPATIENT PSYCHIATRY PROGRESS NOTE - NSBHATTESTBILLING_PSY_A_CORE
51194-Roolclicrx OBS or IP - high complexity OR 50-79 mins [id number]-Oqlgkepafc OBS or IP - high complexity OR 50-79 mins

## 2023-12-15 NOTE — BH CHART NOTE - NSEVENTNOTEFT_PSY_ALL_CORE
Outreach made to patient's father Mo Bartlett at [phone]. VM left with MD call back information requesting return call. Outreach made to patient's father Mo Bartlett at [phone]. VM left with MD call back information requesting return call. Outreach made to patient's father Mo Bartlett at [phone]. VM left with MD call back information requesting return call.    Father called back. Dad reports patient had been struggling with school the entire semester.  Over time she was increasingly overwhelmed with college courses leading to withdrawals. In the days prior to admission she was in bed sleeping all the time, not getting out of bed, not showering, saying that she was going to stay in bed until she deteriorated. She was making statements about wanting to be dead. Feeling hopeless about getting better. She has been socially isolated in general and at school. In the home Dad corroborates that patient will often become more irritable and upset.     Reviewed treatment plan with father who is in agreement.     Outreach made to patient's father Mo Bartlett at [phone]. VM left with MD call back information requesting return call.    Father called back. Dad reports patient had been struggling with school the entire semester.  Over time she was increasingly overwhelmed with college courses leading to withdrawals. In the days prior to admission she was in bed sleeping all the time, not getting out of bed, not showering, saying that she was going to stay in bed until she deteriorated. She was making statements about wanting to be dead. Feeling hopeless about getting better. She has been socially isolated in general and at school. In the home Dad corroborates that patient will often become more irritable and upset.     Reviewed treatment plan with father who is in agreement.

## 2023-12-15 NOTE — BH INPATIENT PSYCHIATRY PROGRESS NOTE - NSBHFUPINTERVALHXFT_PSY_A_CORE
Chart reviewed. Case d/w interdisciplinary team. Patient seen and examined. No acute overnight events, atarax PRN requested for anxiety. Compliant with standing medications. No physical complaints. Slept with interruption.    Today pt reports feeling up and down, with significant anxiety driven by being around groups of people and constantly having to evaluate and censor what she says due to fears of what others will think of her. Feeling frustrated, and sad but trying to push the sadness away. She has some motivation to care for herself but low motivation for groups/treatment. Still with anhedonia, hopelessness, worthlessness, and guilt. Denies passive/active SI or urges for NSSIB.    Sleep with freq awakenings due to anxiety, racing worries and "overthinking." Energy low today. Appetite and PO intake is far. Reports headache and feeling warm/cold yesterday a/w nasal congestion. No other physical complaints.

## 2023-12-15 NOTE — PSYCHIATRIC REHAB INITIAL EVALUATION - NSBHALCSUBTREAT_PSY_ALL_CORE
Patient was currently receiving outpatient therapy and was taking psychotropic medications prior to admission.

## 2023-12-16 LAB
A1C WITH ESTIMATED AVERAGE GLUCOSE RESULT: 5.4 % — SIGNIFICANT CHANGE UP (ref 4–5.6)
A1C WITH ESTIMATED AVERAGE GLUCOSE RESULT: 5.4 % — SIGNIFICANT CHANGE UP (ref 4–5.6)
CHOLEST SERPL-MCNC: 175 MG/DL — SIGNIFICANT CHANGE UP
CHOLEST SERPL-MCNC: 175 MG/DL — SIGNIFICANT CHANGE UP
ESTIMATED AVERAGE GLUCOSE: 108 — SIGNIFICANT CHANGE UP
ESTIMATED AVERAGE GLUCOSE: 108 — SIGNIFICANT CHANGE UP
HDLC SERPL-MCNC: 44 MG/DL — LOW
HDLC SERPL-MCNC: 44 MG/DL — LOW
LIPID PNL WITH DIRECT LDL SERPL: 115 MG/DL — HIGH
LIPID PNL WITH DIRECT LDL SERPL: 115 MG/DL — HIGH
NON HDL CHOLESTEROL: 131 MG/DL — HIGH
NON HDL CHOLESTEROL: 131 MG/DL — HIGH
TRIGL SERPL-MCNC: 81 MG/DL — SIGNIFICANT CHANGE UP
TRIGL SERPL-MCNC: 81 MG/DL — SIGNIFICANT CHANGE UP

## 2023-12-16 PROCEDURE — 99232 SBSQ HOSP IP/OBS MODERATE 35: CPT

## 2023-12-16 RX ORDER — BENZOCAINE AND MENTHOL 5; 1 G/100ML; G/100ML
1 LIQUID ORAL
Refills: 0 | Status: DISCONTINUED | OUTPATIENT
Start: 2023-12-16 | End: 2024-01-08

## 2023-12-16 RX ORDER — ACETAMINOPHEN 500 MG
650 TABLET ORAL ONCE
Refills: 0 | Status: COMPLETED | OUTPATIENT
Start: 2023-12-16 | End: 2023-12-29

## 2023-12-16 RX ADMIN — DULOXETINE HYDROCHLORIDE 20 MILLIGRAM(S): 30 CAPSULE, DELAYED RELEASE ORAL at 09:38

## 2023-12-16 RX ADMIN — QUETIAPINE FUMARATE 150 MILLIGRAM(S): 200 TABLET, FILM COATED ORAL at 21:05

## 2023-12-16 RX ADMIN — GABAPENTIN 100 MILLIGRAM(S): 400 CAPSULE ORAL at 20:53

## 2023-12-16 RX ADMIN — GABAPENTIN 100 MILLIGRAM(S): 400 CAPSULE ORAL at 09:38

## 2023-12-16 RX ADMIN — Medication 1 MILLIGRAM(S): at 09:38

## 2023-12-16 RX ADMIN — Medication 37.5 MILLIGRAM(S): at 09:38

## 2023-12-16 NOTE — BH INPATIENT PSYCHIATRY PROGRESS NOTE - NSBHATTESTBILLING_PSY_A_CORE
82129-Mmkgnpqdag OBS or IP - moderate complexity OR 35-49 mins [id number]-Tpwizsyrbm OBS or IP - moderate complexity OR 35-49 mins

## 2023-12-16 NOTE — BH INPATIENT PSYCHIATRY PROGRESS NOTE - NSBHFUPINTERVALHXFT_PSY_A_CORE
Pt compliant with medication, but per nursing, patient reports anxiety after taking Seroquel and took Atarax PO PRN last night.  Chart reviewed and case discussed with nursing.  Pt reports feeling depressed and anxious with passive SI no I/P.  Pt reports she gets bored and starts thinking about things, like her time in the hospital.  Pt reports she got sad yesterday when she called her sister and had her check her phone.  Pt reports no one contacted her on her phone since being in the hospital.  Pt reports only having one friend in [place] that she does not want to burden.  Pt denies HI/I/P or AH/VH or paranoia.  Pt eating and sleeping well.  Pt reports 30 minutes after taking Seroquel in the hospital it makes her anxious, but that does not occur at home.  Pt feels it is the Seroquel and not external factors.

## 2023-12-16 NOTE — BH INPATIENT PSYCHIATRY PROGRESS NOTE - NSBHASSESSSUMMFT_PSY_ALL_CORE
Assessment:    23F, college student at [employer] (recent withdrawal), single, domiciled with parents and sister with a PPHX of MDD, GEORGE, social anxiety disorder, currently in treatment at [facility], 1 psych hosp (11/7-11/15/19, for depression and poor affect regulation), no history of SA or NSSIB, no history of violence/aggression/legal issues, no history of substance use, PMH sig for PCOS, who presents to ED BIB by father and at recommendation of psychiatrist for low mood and passive thoughts of death in setting of school-related stressors.    Ddx MDD recurrent, severe without psychotic features, GEORGE, Social Anxiety D/O, r/o personality disorder    Pt reports prominent anxiety with social themes, hopelessness, self-judgment, anhedonia, worthlessness, guilt and poor ability to regulate emotions. Denies passive SI and active SI today, tenuously engaged in tx.    Given significant continued anxiety and repression with predominant dysregulation, hopelessness and anhedonia pt requires continued hospitalization for safety and stabilization.    Plan as per primary team    Plan:  1.	Legal: continue 9.13 admission  2.	Safety: routine obs appropriate as pt denying active SI/HI; Haldol/Ativan/Benadryl PRN agitation  3.	Psychiatric: continue home meds for now pending collateral  -cross titrate from Effexor to Cymbalta: decrease Effexor XR to 37.5 mg daily and start Cymbalta 20 mg daily  -add gabapentin 100 mg BID for anxiety for now  -continue Seroquel 150 mg HS for now  -Clonazepam 1 mg daily  4.	I/G/M therapy with DBT  5.	Medical: no acute issues  6.	Collateral/Dispo:   -obtained from Dad and psychiatrist  -dispo when stable-likely benefit from DBT referral   Assessment:    23F, college student at [employer] (recent withdrawal), single, domiciled with parents and sister with a PPHX of MDD, GEORGE, social anxiety disorder, currently in treatment at [facility], 1 psych hosp (11/7-11/15/19, for depression and poor affect regulation), no history of SA or NSSIB, no history of violence/aggression/legal issues, no history of substance use, PMH sig for PCOS, who presents to ED BIB by father and at recommendation of psychiatrist for low mood and passive thoughts of death in setting of school-related stressors.    Ddx MDD recurrent, severe without psychotic features, GEORGE, Social Anxiety D/O, r/o personality disorder    Pt reports prominent anxiety with social themes, hopelessness, self-judgment, anhedonia, worthlessness, guilt and poor ability to regulate emotions. Denies passive SI and active SI today, tenuously engaged in tx.    Given significant continued anxiety and repression with predominant dysregulation, hopelessness and anhedonia pt requires continued hospitalization for safety and stabilization.    Plan as per primary team    Plan:  1.	Legal: continue 9.13 admission  2.	Safety: routine obs appropriate as pt denying active SI/HI; Haldol/Ativan/Benadryl PRN agitation  3.	Psychiatric: continue home meds for now pending collateral  -cross titrate from Effexor to Cymbalta: decrease Effexor XR to 37.5 mg daily and start Cymbalta 20 mg daily  -add gabapentin 100 mg BID for anxiety for now  -continue Seroquel 150 mg HS for now  -Clonazepam 1 mg daily  4.	I/G/M therapy with DBT  5.	Medical: no acute issues  6.	Collateral/Dispo:   -obtained from Dad and psychiatrist  -dispo when stable-likely benefit from DBT referral

## 2023-12-17 PROCEDURE — 99232 SBSQ HOSP IP/OBS MODERATE 35: CPT

## 2023-12-17 RX ADMIN — GABAPENTIN 100 MILLIGRAM(S): 400 CAPSULE ORAL at 09:17

## 2023-12-17 RX ADMIN — Medication 37.5 MILLIGRAM(S): at 09:17

## 2023-12-17 RX ADMIN — QUETIAPINE FUMARATE 150 MILLIGRAM(S): 200 TABLET, FILM COATED ORAL at 20:31

## 2023-12-17 RX ADMIN — Medication 1 MILLIGRAM(S): at 09:18

## 2023-12-17 RX ADMIN — GABAPENTIN 100 MILLIGRAM(S): 400 CAPSULE ORAL at 20:31

## 2023-12-17 RX ADMIN — DULOXETINE HYDROCHLORIDE 20 MILLIGRAM(S): 30 CAPSULE, DELAYED RELEASE ORAL at 09:17

## 2023-12-17 NOTE — BH INPATIENT PSYCHIATRY PROGRESS NOTE - NSBHATTESTBILLING_PSY_A_CORE
34842-Pqvkdbirrg OBS or IP - moderate complexity OR 35-49 mins 55129-Ucjrglygrg OBS or IP - moderate complexity OR 35-49 mins

## 2023-12-17 NOTE — BH INPATIENT PSYCHIATRY PROGRESS NOTE - NSBHASSESSSUMMFT_PSY_ALL_CORE
1. Insect bite, initial encounter    - mupirocin 2 % External Ointment; Apply to affected area twice a day x 5 days.   Dispense: 30 g; Refill: 0    Due to appearance around bug bite to right lower leg - recommend trial of Bactroban twice a day alternating w 36-47 lbs               7.5 ml                       3                              1&1/2  48-59 lbs               10 ml                        4                              2                       1  60-71 lbs               12.5 ml                     5 96 lbs and over                                           4 tsp                              4               2 tablets      Saloni Greenfield MS, APN, CNP    Picadura de insecto  Los insectos suelen picar para defenderse o proteger janki nidos.  Ciertos insectos, co · Para aliviar la picazón y la hinchazón, aplique hielo (dentro de elsa bolsa plástica con cierre) envuelto en elsa toalla delgada sobre las picaduras mera 10 minutos cada vez. Evite darse duchas o giovanna calientes, porque tienden a empeorar la picazón.   · Assessment:    23F, college student at [employer] (recent withdrawal), single, domiciled with parents and sister with a PPHX of MDD, GEORGE, social anxiety disorder, currently in treatment at [facility], 1 psych hosp (11/7-11/15/19, for depression and poor affect regulation), no history of SA or NSSIB, no history of violence/aggression/legal issues, no history of substance use, PMH sig for PCOS, who presents to ED BIB by father and at recommendation of psychiatrist for low mood and passive thoughts of death in setting of school-related stressors.    Ddx MDD recurrent, severe without psychotic features, GEORGE, Social Anxiety D/O, r/o personality disorder    Pt reports prominent anxiety with social themes, hopelessness, self-judgment, anhedonia, worthlessness, guilt and poor ability to regulate emotions. Denies passive SI and active SI today, tenuously engaged in tx.    Pt remains depressed and anxious, but denies SI today    Given significant continued anxiety and repression with predominant dysregulation, hopelessness and anhedonia pt requires continued hospitalization for safety and stabilization.    Plan as per primary team    Plan:  1.	Legal: continue 9.13 admission  2.	Safety: routine obs appropriate as pt denying active SI/HI; Haldol/Ativan/Benadryl PRN agitation  3.	Psychiatric: continue home meds for now pending collateral  -cross titrate from Effexor to Cymbalta: decrease Effexor XR to 37.5 mg daily and start Cymbalta 20 mg daily  -add gabapentin 100 mg BID for anxiety for now  -continue Seroquel 150 mg HS for now  -Clonazepam 1 mg daily  4.	I/G/M therapy with DBT  5.	Medical: no acute issues  6.	Collateral/Dispo:   -obtained from Dad and psychiatrist  -dispo when stable-likely benefit from DBT referral   Assessment:    23F, college student at [employer] (recent withdrawal), single, domiciled with parents and sister with a PPHX of MDD, GEORGE, social anxiety disorder, currently in treatment at [facility], 1 psych hosp (11/7-11/15/19, for depression and poor affect regulation), no history of SA or NSSIB, no history of violence/aggression/legal issues, no history of substance use, PMH sig for PCOS, who presents to ED BIB by father and at recommendation of psychiatrist for low mood and passive thoughts of death in setting of school-related stressors.    Ddx MDD recurrent, severe without psychotic features, GEORGE, Social Anxiety D/O, r/o personality disorder    Pt reports prominent anxiety with social themes, hopelessness, self-judgment, anhedonia, worthlessness, guilt and poor ability to regulate emotions. Denies passive SI and active SI today, tenuously engaged in tx.    Pt remains depressed and anxious, but denies SI today    Given significant continued anxiety and repression with predominant dysregulation, hopelessness and anhedonia pt requires continued hospitalization for safety and stabilization.    Plan as per primary team    Plan:  1.	Legal: continue 9.13 admission  2.	Safety: routine obs appropriate as pt denying active SI/HI; Haldol/Ativan/Benadryl PRN agitation  3.	Psychiatric: continue home meds for now pending collateral  -cross titrate from Effexor to Cymbalta: decrease Effexor XR to 37.5 mg daily and start Cymbalta 20 mg daily  -add gabapentin 100 mg BID for anxiety for now  -continue Seroquel 150 mg HS for now  -Clonazepam 1 mg daily  4.	I/G/M therapy with DBT  5.	Medical: no acute issues  6.	Collateral/Dispo:   -obtained from Dad and psychiatrist  -dispo when stable-likely benefit from DBT referral

## 2023-12-17 NOTE — BH INPATIENT PSYCHIATRY PROGRESS NOTE - NSBHFUPINTERVALHXFT_PSY_A_CORE
Pt compliant with medication and tolerating it well.  Chart reviewed and case discussed with nursing.  No events reported overnight.  Pt denies getting anxious after taking Seroquel last night.  Pt denies SI/HI/I/P or AH/VH or paranoia.  Pt eating and sleeping well.  Pt continues to feel depressed and anxious.  Pt reports she feels she appears well to others because she is doing the work on the unit, but inside she is still not feeling well.  Pt reports some frustration with  her mother because she feels her actions are not sincere.  Pt reports she is scared to be home for [place] and does not know what she will do when she gets out of here to pass the time because she does not have school or work. Pt compliant with medication and tolerating it well.  Chart reviewed and case discussed with nursing.  No events reported overnight.  Pt denies getting anxious after taking Seroquel last night.  Pt denies SI/HI/I/P or AH/VH or paranoia.  Pt eating and sleeping well.  Pt continues to feel depressed and anxious.  Pt reports she feels she appears well to others because she is doing the work on the unit, but inside she is still not feeling well.  Pt reports some frustration with  her mother because she feels her actions are not sincere.  Pt reports she is scared to be home for [place] and does not know what she will do when she gets out of here to pass the time because she does not have school or work.

## 2023-12-18 PROCEDURE — 90853 GROUP PSYCHOTHERAPY: CPT

## 2023-12-18 PROCEDURE — 99232 SBSQ HOSP IP/OBS MODERATE 35: CPT

## 2023-12-18 RX ADMIN — Medication 1 MILLIGRAM(S): at 10:42

## 2023-12-18 RX ADMIN — Medication 37.5 MILLIGRAM(S): at 10:42

## 2023-12-18 RX ADMIN — GABAPENTIN 100 MILLIGRAM(S): 400 CAPSULE ORAL at 20:21

## 2023-12-18 RX ADMIN — GABAPENTIN 100 MILLIGRAM(S): 400 CAPSULE ORAL at 10:41

## 2023-12-18 RX ADMIN — QUETIAPINE FUMARATE 150 MILLIGRAM(S): 200 TABLET, FILM COATED ORAL at 20:21

## 2023-12-18 RX ADMIN — DULOXETINE HYDROCHLORIDE 20 MILLIGRAM(S): 30 CAPSULE, DELAYED RELEASE ORAL at 10:41

## 2023-12-18 NOTE — BH INPATIENT PSYCHIATRY PROGRESS NOTE - NSBHATTESTBILLING_PSY_A_CORE
16723-Guyrrysrkw OBS or IP - high complexity OR 50-79 mins 63553-Gkialvhrft OBS or IP - high complexity OR 50-79 mins 35161-Mwcvfuogwy OBS or IP - moderate complexity OR 35-49 mins 33365-Cnmjmsyjwm OBS or IP - moderate complexity OR 35-49 mins

## 2023-12-18 NOTE — BH INPATIENT PSYCHIATRY PROGRESS NOTE - NSBHASSESSSUMMFT_PSY_ALL_CORE
Assessment:    23F, college student at [employer] (recent withdrawal), single, domiciled with parents and sister with a PPHX of MDD, GEORGE, social anxiety disorder, currently in treatment at [facility], 1 psych hosp (11/7-11/15/19, for depression and poor affect regulation), no history of SA or NSSIB, no history of violence/aggression/legal issues, no history of substance use, PMH sig for PCOS, who presents to ED BIB by father and at recommendation of psychiatrist for low mood and passive thoughts of death in setting of school-related stressors.    Ddx MDD recurrent, severe without psychotic features, GEORGE, Social Anxiety D/O, r/o personality disorder    Today pt reports more prominent depressive symptoms including amotivation, hopelessness, self-judgment, anhedonia, worthlessness, guilt and passive SI. Denies active SI today, tenuously engaged in tx with willfulness.    Given significant continued anxiety and depression with predominant dysregulation, hopelessness and anhedonia pt requires continued hospitalization for safety and stabilization.    Plan:  1.	Legal: continue 9.13 admission  2.	Safety: routine obs appropriate as pt denying active SI/HI; Haldol/Ativan/Benadryl PRN agitation  3.	Psychiatric: continue home meds for now pending collateral  -cross titrate from Effexor to Cymbalta: decrease Effexor XR to 37.5 mg daily and start Cymbalta 20 mg daily  -add gabapentin 100 mg BID for anxiety for now  -continue Seroquel 150 mg HS for now  -Clonazepam 1 mg daily  4.	I/G/M therapy with DBT  5.	Medical: no acute issues  6.	Collateral/Dispo:   -obtained from Dad and psychiatrist  -dispo when stable-likely benefit from DBT referral   Assessment:    23F, college student at [employer] (recent withdrawal), single, domiciled with parents and sister with a PPHX of MDD, GEORGE, social anxiety disorder, currently in treatment at [facility], 1 psych hosp (11/7-11/15/19, for depression and poor affect regulation), no history of SA or NSSIB, no history of violence/aggression/legal issues, no history of substance use, PMH sig for PCOS, who presents to ED BIB by father and at recommendation of psychiatrist for low mood and passive thoughts of death in setting of school-related stressors.    Ddx MDD recurrent, severe without psychotic features, GEORGE, Social Anxiety D/O, r/o personality disorder    Today pt reports more prominent depressive symptoms including amotivation, hopelessness, self-judgment, anhedonia, worthlessness, guilt and passive SI. Denies active SI today, tenuously engaged in tx with willfulness.    Given significant continued anxiety and depression with predominant dysregulation, hopelessness and anhedonia pt requires continued hospitalization for safety and stabilization.    Plan:  1.	Legal: continue 9.13 admission  2.	Safety: routine obs appropriate as pt denying active SI/HI; Haldol/Ativan/Benadryl PRN agitation  3.	Psychiatric: continue home meds for now pending collateral  -cross titrate from Effexor to Cymbalta: decrease Effexor XR to 37.5 mg daily and start Cymbalta 20 mg daily  -add gabapentin 100 mg BID for anxiety for now  -continue Seroquel 150 mg HS for now  -Clonazepam 1 mg daily  4.	I/G/M therapy with DBT  5.	Medical: no acute issues  6.	Collateral/Dispo:   -obtained from Dad and psychiatrist  -dispo when stable-likely benefit from DBT referral   Assessment:    23F, college student at [employer] (recent withdrawal), single, domiciled with parents and sister with a PPHX of MDD, GEORGE, social anxiety disorder, currently in treatment at [facility], 1 psych hosp (11/7-11/15/19, for depression and poor affect regulation), no history of SA or NSSIB, no history of violence/aggression/legal issues, no history of substance use, PMH sig for PCOS, who presents to ED BIB by father and at recommendation of psychiatrist for low mood and passive thoughts of death in setting of school-related stressors.    Ddx MDD recurrent, severe without psychotic features, GEORGE, Social Anxiety D/O, r/o personality disorder    Today pt reports more prominent depressive symptoms including amotivation, hopelessness, self-judgment, anhedonia, worthlessness, guilt and passive SI. Denies active SI today, tenuously engaged in tx with willfulness.    Given significant continued anxiety and depression with predominant dysregulation, hopelessness and anhedonia pt requires continued hospitalization for safety and stabilization.    Plan:  1.	Legal: continue 9.13 admission  2.	Safety: routine obs appropriate as pt denying active SI/HI; Haldol/Ativan/Benadryl PRN agitation  3.	Psychiatric: continue home meds for now pending collateral  -cross titrate from Effexor to Cymbalta: Effexor XR 37.5 mg daily and Cymbalta 20 mg daily  -continue gabapentin 100 mg BID for anxiety for now  -continue Seroquel 150 mg HS for now  -Clonazepam 1 mg daily  4.	I/G/M therapy with DBT  5.	Medical: no acute issues  6.	Collateral/Dispo:   -obtained from Dad and psychiatrist  -dispo when stable-likely benefit from DBT referral   Assessment:    23F, college student at [employer] (recent withdrawal), single, domiciled with parents and sister with a PPHX of MDD, GEORGE, social anxiety disorder, currently in treatment at [facility], 1 psych hosp (11/7-11/15/19, for depression and poor affect regulation), no history of SA or NSSIB, no history of violence/aggression/legal issues, no history of substance use, PMH sig for PCOS, who presents to ED BIB by father and at recommendation of psychiatrist for low mood and passive thoughts of death in setting of school-related stressors.    Ddx MDD recurrent, severe without psychotic features, GEORGE, Social Anxiety D/O, r/o personality disorder    Today pt reports more prominent depressive symptoms including amotivation, hopelessness, self-judgment, anhedonia, worthlessness, guilt and passive SI. Denies active SI today, tenuously engaged in tx with willfulness.    Given significant continued anxiety and depression with predominant dysregulation, hopelessness and anhedonia pt requires continued hospitalization for safety and stabilization.    Plan:  1.	Legal: continue 9.13 admission  2.	Safety: routine obs appropriate as pt denying active SI/HI; Haldol/Ativan/Benadryl PRN agitation  3.	Psychiatric: continue home meds for now pending collateral  -cross titrate from Effexor to Cymbalta: Effexor XR 37.5 mg daily and Cymbalta 20 mg daily  -continue gabapentin 100 mg BID for anxiety for now  -continue Seroquel 150 mg HS for now  -Clonazepam 1 mg daily  4.	I/G/M therapy with DBT  5.	Medical: no acute issues  6.	Collateral/Dispo:   -obtained from Dad and psychiatrist  -dispo when stable-likely benefit from DBT referral

## 2023-12-18 NOTE — BH PSYCHOLOGY - GROUP THERAPY NOTE - NSPSYCHOLGRPDBTPT_PSY_A_CORE FT
DBT Group is a group in which patients learn skills to better manage their emotions and behaviors. Group begins with a mindfulness practice so that patients have an opportunity to practice observing their internal and external experiences.  Following the mindfulness exercise the group learns new skills in a didactic format. Group today focused on the “emotion regulation” module.  Specifically, patients learned the skills of “check the facts,” and “opposite action.” “Check the facts” is about being more realistic about interpretations and assumptions and challenging them appropriately to think more rationally, and “opposite action” involves acting opposite to problematic urges that come with emotions.  provided an example of checking the facts, and patients were encouraged to think about how these skills, and were assigned homework to practice.

## 2023-12-18 NOTE — BH INPATIENT PSYCHIATRY PROGRESS NOTE - NSBHFUPINTERVALHXFT_PSY_A_CORE
Chart reviewed. Case d/w interdisciplinary team. Patient seen and examined. No acute overnight events, no PRNs required or requested. Compliant with standing medications. No physical complaints. Slept per sleep log.    Pt reports continuing to feel up and down. Over the weekend has been having difficulty sleeping, with inc sleep latency 2/2 ruminating thoughts about how she is a bad person. Same thoughts are connected to irritability with family at visiting when they try to provider her reassurance. Only skill she tried to manage these thoughts was journaling which was not effective.    When asked about mood states she has difficulty identifying internal vs external mood states, so said she is still feeling "not fine" on the inside. Energy is "in the middle," still taking some naps during the day. Is only motivated to care for self via external forces. Continues with anhedonia, hopelessness, guilt, worthlessness. Passive SI is 1/5 rated, denies active SI, denies urges for NSSIB. Overall anxiety is decreased, still remains present in social intercations.    Appetite and PO intake are fair. No concerns for medication side effects.

## 2023-12-19 PROCEDURE — 90853 GROUP PSYCHOTHERAPY: CPT

## 2023-12-19 PROCEDURE — 99233 SBSQ HOSP IP/OBS HIGH 50: CPT

## 2023-12-19 RX ORDER — DULOXETINE HYDROCHLORIDE 30 MG/1
40 CAPSULE, DELAYED RELEASE ORAL DAILY
Refills: 0 | Status: DISCONTINUED | OUTPATIENT
Start: 2023-12-20 | End: 2023-12-22

## 2023-12-19 RX ADMIN — QUETIAPINE FUMARATE 150 MILLIGRAM(S): 200 TABLET, FILM COATED ORAL at 20:33

## 2023-12-19 RX ADMIN — Medication 37.5 MILLIGRAM(S): at 09:59

## 2023-12-19 RX ADMIN — GABAPENTIN 100 MILLIGRAM(S): 400 CAPSULE ORAL at 09:59

## 2023-12-19 RX ADMIN — DULOXETINE HYDROCHLORIDE 20 MILLIGRAM(S): 30 CAPSULE, DELAYED RELEASE ORAL at 09:59

## 2023-12-19 RX ADMIN — GABAPENTIN 100 MILLIGRAM(S): 400 CAPSULE ORAL at 20:33

## 2023-12-19 RX ADMIN — Medication 1 MILLIGRAM(S): at 09:59

## 2023-12-19 NOTE — BH INPATIENT PSYCHIATRY PROGRESS NOTE - NSBHASSESSSUMMFT_PSY_ALL_CORE
Assessment:    23F, college student at [employer] (recent withdrawal), single, domiciled with parents and sister with a PPHX of MDD, GEORGE, social anxiety disorder, currently in treatment at [facility], 1 psych hosp (11/7-11/15/19, for depression and poor affect regulation), no history of SA or NSSIB, no history of violence/aggression/legal issues, no history of substance use, PMH sig for PCOS, who presents to ED BIB by father and at recommendation of psychiatrist for low mood and passive thoughts of death in setting of school-related stressors.    Ddx MDD recurrent, severe without psychotic features, GEORGE, Social Anxiety D/O, r/o personality disorder    Today pt reports continued prominent depressive symptoms including significant hopelessness, loneliness, self-judgment, anhedonia, amotivation, worthlessness, guilt and passive SI. Denies active SI today, small increases in engagement in tx with willfulness still.    Given significant continued anxiety and depression with predominant dysregulation, hopelessness and anhedonia pt requires continued hospitalization for safety and stabilization.    Plan:  1.	Legal: continue 9.13 admission  2.	Safety: routine obs appropriate as pt denying active SI/HI; Haldol/Ativan/Benadryl PRN agitation  3.	Psychiatric: continue home meds for now pending collateral  -cross titrate from Effexor to Cymbalta: discontinue Effexor XR 37.5 mg daily and increase Cymbalta to 40 mg daily  -continue gabapentin 100 mg BID for anxiety for now  -continue Seroquel 150 mg HS for now  -Clonazepam 1 mg daily  4.	I/G/M therapy with DBT  5.	Medical: no acute issues  6.	Collateral/Dispo:   -obtained from Dad and psychiatrist  -dispo when stable-likely benefit from DBT referral   Assessment:    23F, college student at [employer] (recent withdrawal), single, domiciled with parents and sister with a PPHX of MDD, GEORGE, social anxiety disorder, currently in treatment at [facility], 1 psych hosp (11/7-11/15/19, for depression and poor affect regulation), no history of SA or NSSIB, no history of violence/aggression/legal issues, no history of substance use, PMH sig for PCOS, who presents to ED BIB by father and at recommendation of psychiatrist for low mood and passive thoughts of death in setting of school-related stressors.    Ddx MDD recurrent, severe without psychotic features, GEORGE, Social Anxiety D/O, r/o personality disorder    Today pt reports continued prominent depressive symptoms including significant hopelessness, loneliness, self-judgment, anhedonia, amotivation, worthlessness, guilt and passive SI. Denies active SI today, small increases in engagement in tx with willfulness still.    Given significant continued anxiety and depression with predominant dysregulation, hopelessness and anhedonia pt requires continued hospitalization for safety and stabilization.    Plan:  1.	Legal: continue 9.13 admission  2.	Safety: routine obs appropriate as pt denying active SI/HI; Haldol/Ativan/Benadryl PRN agitation  3.	Psychiatric: continue home meds for now pending collateral  -cross titrate from Effexor to Cymbalta: discontinue Effexor XR 37.5 mg daily and increase Cymbalta to 40 mg daily  -continue gabapentin 100 mg BID for anxiety for now  -continue Seroquel 150 mg HS for now  -Clonazepam 1 mg daily  4.	I/G/M therapy with DBT  5.	Medical: no acute issues  6.	Collateral/Dispo:   -obtained from Dad and psychiatrist  -dispo when stable-likely benefit from DBT referral   - - -

## 2023-12-19 NOTE — BH PSYCHOLOGY - GROUP THERAPY NOTE - NSPSYCHOLGRPDBTPT_PSY_A_CORE FT
DBT Group is a group in which patients learn skills to better manage their emotions and behaviors. Group begins with a mindfulness practice so that patients have an opportunity to practice observing their internal and external experiences.  Following the mindfulness exercise the group learns new skills in a didactic format. Group today focused on the “distress tolerance” module, which are skills to help patients manage their crisis urges.  Specifically, pts learned the skill of “radical acceptance,” which includes accepting painful situations in their lives they cannot change through turning the mind and practicing willingness. Patients were asked to determine difficult situations in their lives they needed to work on accepting, and provided examples of ways to practice this while in the hospital.

## 2023-12-19 NOTE — BH INPATIENT PSYCHIATRY PROGRESS NOTE - NSBHATTESTBILLING_PSY_A_CORE
20644-Ssebllbqnf OBS or IP - high complexity OR 50-79 mins 59990-Uezagskhcr OBS or IP - high complexity OR 50-79 mins

## 2023-12-19 NOTE — BH INPATIENT PSYCHIATRY PROGRESS NOTE - NSBHFUPINTERVALHXFT_PSY_A_CORE
Chart reviewed. Case d/w interdisciplinary team. Patient seen and examined. Requested to speak to PES last night re: distress and SI, no PRNs required or requested. Compliant with standing medications. Reports lower back pain w/ onset this AM-no other physical complaints. Slept per sleep log.    Today pt reports that she had a very difficulty evening, after asking her father if anyone had called/texted her on her cell phone and learning that the answer was no. Shared feelings about invalidation by family, social isolation and recurrent rejection, shame, guilt. Hates being in the hospital, at home, and on earth; feels like she is just taking space. Feels extremely lonely with intense hopelessness and passive SI, denies active SI/intent/plan.     Reports sleeping okay last night but energy remains very low. Is bored on unit, going to groups, still with anhedonia only getting enjoyment from music. Doing ADLs due to external pressure/reminders, with very little internal motivation. Appetite and PO intake decreased since last night. No concerns for medication side effects.

## 2023-12-20 LAB
B PERT DNA SPEC QL NAA+PROBE: SIGNIFICANT CHANGE UP
B PERT DNA SPEC QL NAA+PROBE: SIGNIFICANT CHANGE UP
B PERT+PARAPERT DNA PNL SPEC NAA+PROBE: SIGNIFICANT CHANGE UP
B PERT+PARAPERT DNA PNL SPEC NAA+PROBE: SIGNIFICANT CHANGE UP
BORDETELLA PARAPERTUSSIS (RAPRVP): SIGNIFICANT CHANGE UP
BORDETELLA PARAPERTUSSIS (RAPRVP): SIGNIFICANT CHANGE UP
C PNEUM DNA SPEC QL NAA+PROBE: SIGNIFICANT CHANGE UP
C PNEUM DNA SPEC QL NAA+PROBE: SIGNIFICANT CHANGE UP
FLUAV SUBTYP SPEC NAA+PROBE: SIGNIFICANT CHANGE UP
FLUAV SUBTYP SPEC NAA+PROBE: SIGNIFICANT CHANGE UP
FLUBV RNA SPEC QL NAA+PROBE: SIGNIFICANT CHANGE UP
FLUBV RNA SPEC QL NAA+PROBE: SIGNIFICANT CHANGE UP
HADV DNA SPEC QL NAA+PROBE: SIGNIFICANT CHANGE UP
HADV DNA SPEC QL NAA+PROBE: SIGNIFICANT CHANGE UP
HCOV 229E RNA SPEC QL NAA+PROBE: SIGNIFICANT CHANGE UP
HCOV 229E RNA SPEC QL NAA+PROBE: SIGNIFICANT CHANGE UP
HCOV HKU1 RNA SPEC QL NAA+PROBE: SIGNIFICANT CHANGE UP
HCOV HKU1 RNA SPEC QL NAA+PROBE: SIGNIFICANT CHANGE UP
HCOV NL63 RNA SPEC QL NAA+PROBE: SIGNIFICANT CHANGE UP
HCOV NL63 RNA SPEC QL NAA+PROBE: SIGNIFICANT CHANGE UP
HCOV OC43 RNA SPEC QL NAA+PROBE: SIGNIFICANT CHANGE UP
HCOV OC43 RNA SPEC QL NAA+PROBE: SIGNIFICANT CHANGE UP
HMPV RNA SPEC QL NAA+PROBE: SIGNIFICANT CHANGE UP
HMPV RNA SPEC QL NAA+PROBE: SIGNIFICANT CHANGE UP
HPIV1 RNA SPEC QL NAA+PROBE: SIGNIFICANT CHANGE UP
HPIV1 RNA SPEC QL NAA+PROBE: SIGNIFICANT CHANGE UP
HPIV2 RNA SPEC QL NAA+PROBE: SIGNIFICANT CHANGE UP
HPIV2 RNA SPEC QL NAA+PROBE: SIGNIFICANT CHANGE UP
HPIV3 RNA SPEC QL NAA+PROBE: SIGNIFICANT CHANGE UP
HPIV3 RNA SPEC QL NAA+PROBE: SIGNIFICANT CHANGE UP
HPIV4 RNA SPEC QL NAA+PROBE: SIGNIFICANT CHANGE UP
HPIV4 RNA SPEC QL NAA+PROBE: SIGNIFICANT CHANGE UP
M PNEUMO DNA SPEC QL NAA+PROBE: SIGNIFICANT CHANGE UP
M PNEUMO DNA SPEC QL NAA+PROBE: SIGNIFICANT CHANGE UP
RAPID RVP RESULT: SIGNIFICANT CHANGE UP
RAPID RVP RESULT: SIGNIFICANT CHANGE UP
RSV RNA SPEC QL NAA+PROBE: SIGNIFICANT CHANGE UP
RSV RNA SPEC QL NAA+PROBE: SIGNIFICANT CHANGE UP
RV+EV RNA SPEC QL NAA+PROBE: SIGNIFICANT CHANGE UP
RV+EV RNA SPEC QL NAA+PROBE: SIGNIFICANT CHANGE UP
SARS-COV-2 RNA SPEC QL NAA+PROBE: SIGNIFICANT CHANGE UP
SARS-COV-2 RNA SPEC QL NAA+PROBE: SIGNIFICANT CHANGE UP

## 2023-12-20 PROCEDURE — 99232 SBSQ HOSP IP/OBS MODERATE 35: CPT | Mod: GC

## 2023-12-20 PROCEDURE — 90853 GROUP PSYCHOTHERAPY: CPT

## 2023-12-20 RX ORDER — CLONAZEPAM 1 MG
1 TABLET ORAL DAILY
Refills: 0 | Status: DISCONTINUED | OUTPATIENT
Start: 2023-12-20 | End: 2023-12-27

## 2023-12-20 RX ADMIN — Medication 650 MILLIGRAM(S): at 08:29

## 2023-12-20 RX ADMIN — GABAPENTIN 100 MILLIGRAM(S): 400 CAPSULE ORAL at 20:57

## 2023-12-20 RX ADMIN — QUETIAPINE FUMARATE 150 MILLIGRAM(S): 200 TABLET, FILM COATED ORAL at 20:57

## 2023-12-20 RX ADMIN — Medication 650 MILLIGRAM(S): at 08:59

## 2023-12-20 RX ADMIN — Medication 1 MILLIGRAM(S): at 08:29

## 2023-12-20 RX ADMIN — GABAPENTIN 100 MILLIGRAM(S): 400 CAPSULE ORAL at 08:29

## 2023-12-20 RX ADMIN — DULOXETINE HYDROCHLORIDE 40 MILLIGRAM(S): 30 CAPSULE, DELAYED RELEASE ORAL at 08:28

## 2023-12-20 NOTE — BH INPATIENT PSYCHIATRY PROGRESS NOTE - NSBHFUPINTERVALHXFT_PSY_A_CORE
Chart reviewed. Case d/w interdisciplinary team. Patient seen and examined. No acute events overnight, no PRNs required or requested. Compliant with standing medications. Slept per sleep log. Per staff was tearful overnight, but otherwise visible in milieu and groups.    Pt reports waking this AM feeling cold with throat and ear pain that resolved with tylenol PRN. No other physical complaints.    Shares that she had to leave DBT group this AM that was focused on willfulness vs willingness and radical acceptance because it feels to her like there is no point to these skills. She is taking up space, feels like a burden, continuously compares herself to others and is annoyed by encouragement. Feels extremely hopeless with intense passive SI but denies active SI/plan/intent.     Reports sleeping okay last night but energy remains very low. Still with anhedonia only getting enjoyment from music. Doing ADLs due to external pressure/reminders, with very little internal motivation. Appetite and PO intake improved in afternoon. No concerns for medication side effects.

## 2023-12-20 NOTE — BH INPATIENT PSYCHIATRY PROGRESS NOTE - NSBHATTESTBILLING_PSY_A_CORE
53823-Hswctxpjiy OBS or IP - moderate complexity OR 35-49 mins 99175-Mmhjzpiblz OBS or IP - moderate complexity OR 35-49 mins

## 2023-12-20 NOTE — BH INPATIENT PSYCHIATRY PROGRESS NOTE - NSBHASSESSSUMMFT_PSY_ALL_CORE
Assessment:    23F, college student at [employer] (recent withdrawal), single, domiciled with parents and sister with a PPHX of MDD, GEORGE, social anxiety disorder, currently in treatment at [facility], 1 psych hosp (11/7-11/15/19, for depression and poor affect regulation), no history of SA or NSSIB, no history of violence/aggression/legal issues, no history of substance use, PMH sig for PCOS, who presents to ED BIB by father and at recommendation of psychiatrist for low mood and passive thoughts of death in setting of school-related stressors.    Ddx MDD recurrent, severe without psychotic features, GEORGE, Social Anxiety D/O, r/o personality disorder    Today pt reports continued prominent depressive symptoms including significant hopelessness, loneliness, self-judgment, anhedonia, amotivation, worthlessness, guilt and passive SI. Also prominent today is willfulness and hopelessness about change and/or treatment. Denies active SI today, tolerating medication changes. Reporting URI sxs (and reports Dad has flu-like symptoms, last visited [place]) so repeated RVP this AM-results pending.    Given significant continued anxiety and depression with predominant dysregulation, hopelessness and anhedonia pt requires continued hospitalization for safety and stabilization.    Plan:  1.	Legal: continue 9.13 admission  2.	Safety: routine obs appropriate as pt denying active SI/HI; Haldol/Ativan/Benadryl PRN agitation  3.	Psychiatric: continue home meds for now pending collateral  -continue Cymbalta 40 mg daily  -continue gabapentin 100 mg BID for anxiety for now  -continue Seroquel 150 mg HS for now  -Clonazepam 1 mg daily  4.	I/G/M therapy with DBT  5.	Medical: RVP for URI sxs  6.	Collateral/Dispo:   -obtained from Dad and psychiatrist  -dispo when stable-likely benefit from DBT referral   Assessment:    23F, college student at [employer] (recent withdrawal), single, domiciled with parents and sister with a PPHX of MDD, GEORGE, social anxiety disorder, currently in treatment at [facility], 1 psych hosp (11/7-11/15/19, for depression and poor affect regulation), no history of SA or NSSIB, no history of violence/aggression/legal issues, no history of substance use, PMH sig for PCOS, who presents to ED BIB by father and at recommendation of psychiatrist for low mood and passive thoughts of death in setting of school-related stressors.    Ddx MDD recurrent, severe without psychotic features, GEORGE, Social Anxiety D/O, r/o personality disorder    Today pt reports continued prominent depressive symptoms including significant hopelessness, loneliness, self-judgment, anhedonia, amotivation, worthlessness, guilt and passive SI. Also prominent today is willfulness and hopelessness about change and/or treatment. Denies active SI today, tolerating medication changes. Reporting URI sxs (and reports Dad has flu-like symptoms, last visited [place]) so repeated RVP this AM-results pending.    Given significant continued anxiety and depression with predominant dysregulation, hopelessness and anhedonia pt requires continued hospitalization for safety and stabilization.    Plan:  1.	Legal: continue 9.13 admission  2.	Safety: routine obs appropriate as pt denying active SI/HI; Haldol/Ativan/Benadryl PRN agitation  3.	Psychiatric: continue home meds for now pending collateral  -continue Cymbalta 40 mg daily  -continue gabapentin 100 mg BID for anxiety for now  -continue Seroquel 150 mg HS for now  -Clonazepam 1 mg daily  4.	I/G/M therapy with DBT  5.	Medical: RVP for URI sxs  6.	Collateral/Dispo:   -obtained from Dad and psychiatrist  -dispo when stable-likely benefit from DBT referral

## 2023-12-20 NOTE — BH PSYCHOLOGY - GROUP THERAPY NOTE - NSPSYCHOLGRPDBTPT_PSY_A_CORE FT
DBT skills generalization group is a group in which patients review the skill taught the day before, and patients have the opportunity to troubleshoot the skill, engage in more practice, and share their experience using the skill. Today’s skills review group focused on the skills of “radical acceptance” and "willingness" which include accepting painful situations in their lives they cannot change through turning the mind and practicing engaging in reality effectively. Patients were asked to determine difficult situations in their lives they needed to work on accepting, and provided examples of ways to practice this while in the hospital.

## 2023-12-21 PROCEDURE — 99232 SBSQ HOSP IP/OBS MODERATE 35: CPT | Mod: 25

## 2023-12-21 PROCEDURE — 90853 GROUP PSYCHOTHERAPY: CPT | Mod: 25

## 2023-12-21 RX ADMIN — DULOXETINE HYDROCHLORIDE 40 MILLIGRAM(S): 30 CAPSULE, DELAYED RELEASE ORAL at 09:05

## 2023-12-21 RX ADMIN — GABAPENTIN 100 MILLIGRAM(S): 400 CAPSULE ORAL at 20:11

## 2023-12-21 RX ADMIN — GABAPENTIN 100 MILLIGRAM(S): 400 CAPSULE ORAL at 09:05

## 2023-12-21 RX ADMIN — Medication 1 MILLIGRAM(S): at 09:05

## 2023-12-21 RX ADMIN — QUETIAPINE FUMARATE 150 MILLIGRAM(S): 200 TABLET, FILM COATED ORAL at 20:11

## 2023-12-21 NOTE — BH INPATIENT PSYCHIATRY PROGRESS NOTE - NSBHASSESSSUMMFT_PSY_ALL_CORE
Assessment:    23F, college student at [employer] (recent withdrawal), single, domiciled with parents and sister with a PPHX of MDD, GEORGE, social anxiety disorder, currently in treatment at [facility], 1 psych hosp (11/7-11/15/19, for depression and poor affect regulation), no history of SA or NSSIB, no history of violence/aggression/legal issues, no history of substance use, PMH sig for PCOS, who presents to ED BIB by father and at recommendation of psychiatrist for low mood and passive thoughts of death in setting of school-related stressors.    Ddx MDD recurrent, severe without psychotic features, GEORGE, Social Anxiety D/O, r/o personality disorder    Today pt reports continued prominent depressive symptoms including significant hopelessness, loneliness, self-judgment, anhedonia, amotivation, worthlessness, guilt and passive SI. Also prominent today is willfulness and hopelessness about change and/or treatment. Denies active SI today, tolerating medication changes. Reporting URI sxs (and reports Dad has flu-like symptoms, last visited [place]) so repeated RVP this AM-results pending.    Given significant continued anxiety and depression with predominant dysregulation, hopelessness and anhedonia pt requires continued hospitalization for safety and stabilization.    Plan:  1.	Legal: continue 9.13 admission  2.	Safety: routine obs appropriate as pt denying active SI/HI; Haldol/Ativan/Benadryl PRN agitation  3.	Psychiatric: continue home meds for now pending collateral  -continue Cymbalta 40 mg daily  -continue gabapentin 100 mg BID for anxiety for now  -continue Seroquel 150 mg HS for now  -Clonazepam 1 mg daily  4.	I/G/M therapy with DBT  5.	Medical: RVP for URI sxs  6.	Collateral/Dispo:   -obtained from Dad and psychiatrist  -dispo when stable-likely benefit from DBT referral   Assessment:    23F, college student at [employer] (recent withdrawal), single, domiciled with parents and sister with a PPHX of MDD, GEORGE, social anxiety disorder, currently in treatment at [facility], 1 psych hosp (11/7-11/15/19, for depression and poor affect regulation), no history of SA or NSSIB, no history of violence/aggression/legal issues, no history of substance use, PMH sig for PCOS, who presents to ED BIB by father and at recommendation of psychiatrist for low mood and passive thoughts of death in setting of school-related stressors.    Ddx MDD recurrent, severe without psychotic features, GEORGE, Social Anxiety D/O, r/o personality disorder    Today pt reports continued prominent depressive symptoms including significant hopelessness, loneliness, self-judgment, anhedonia, amotivation, worthlessness, guilt and passive SI. Also prominent today is willfulness and hopelessness about change and/or treatment. Denies active SI today, tolerating medication changes. Reporting URI sxs (and reports Dad has flu-like symptoms, last visited [place]) so repeated RVP this AM-results pending.    Given significant continued anxiety and depression with predominant dysregulation, hopelessness and anhedonia pt requires continued hospitalization for safety and stabilization.    Plan:  1.	Legal: continue 9.13 admission  2.	Safety: routine obs appropriate as pt denying active SI/HI; Haldol/Ativan/Benadryl PRN agitation  3.	Psychiatric: continue home meds for now pending collateral  -continue Cymbalta 40 mg daily  -continue gabapentin 100 mg BID for anxiety for now  -continue Seroquel 150 mg HS for now  -Clonazepam 1 mg daily  4.	I/G/M therapy with DBT  5.	Medical: RVP for URI sxs  6.	Collateral/Dispo:   -obtained from Dad and psychiatrist  -dispo when stable-likely benefit from DBT referral   Assessment:    23F, college student at [employer] (recent withdrawal), single, domiciled with parents and sister with a PPHX of MDD, GEORGE, social anxiety disorder, currently in treatment at [facility], 1 psych hosp (11/7-11/15/19, for depression and poor affect regulation), no history of SA or NSSIB, no history of violence/aggression/legal issues, no history of substance use, PMH sig for PCOS, who presents to ED BIB by father and at recommendation of psychiatrist for low mood and passive thoughts of death in setting of school-related stressors.    Ddx MDD recurrent, severe without psychotic features, GEORGE, Social Anxiety D/O, r/o personality disorder    Today pt reports continued prominent depressive symptoms including hopelessness, fluctuating anhedonia, amotivation, worthlessness and passive SI. Willfullness slightly decreased today, with small increases in skill use. Denies active SI today, tolerating medication changes. No URI sxs and RVP negative yesterday.    Given significant continued anxiety and depression with predominant dysregulation, hopelessness and anhedonia pt requires continued hospitalization for safety and stabilization.    Plan:  1.	Legal: continue 9.13 admission  2.	Safety: routine obs appropriate as pt denying active SI/HI; Haldol/Ativan/Benadryl PRN agitation  3.	Psychiatric: continue home meds for now pending collateral  -continue Cymbalta 40 mg daily  -continue gabapentin 100 mg BID for anxiety for now  -continue Seroquel 150 mg HS for now  -Clonazepam 1 mg daily  4.	I/G/M therapy with DBT  5.	Medical: RVP for URI sxs--negative and no sxs today  6.	Collateral/Dispo:   -obtained from Dad and psychiatrist  -dispo when stable-likely benefit from DBT referral   Assessment:    23F, college student at [employer] (recent withdrawal), single, domiciled with parents and sister with a PPHX of MDD, GEORGE, social anxiety disorder, currently in treatment at [facility], 1 psych hosp (11/7-11/15/19, for depression and poor affect regulation), no history of SA or NSSIB, no history of violence/aggression/legal issues, no history of substance use, PMH sig for PCOS, who presents to ED BIB by father and at recommendation of psychiatrist for low mood and passive thoughts of death in setting of school-related stressors.    Ddx MDD recurrent, severe without psychotic features, GEORGE, Social Anxiety D/O, r/o personality disorder    Today pt reports continued prominent depressive symptoms including hopelessness, fluctuating anhedonia, amotivation, worthlessness and passive SI. Willfullness slightly decreased today, with small increases in skill use. Denies active SI today, tolerating medication changes. No URI sxs and RVP negative yesterday.    Given significant continued anxiety and depression with predominant dysregulation, hopelessness and anhedonia pt requires continued hospitalization for safety and stabilization.    Plan:  1.	Legal: continue 9.13 admission  2.	Safety: routine obs appropriate as pt denying active SI/HI; Haldol/Ativan/Benadryl PRN agitation  3.	Psychiatric: continue home meds for now pending collateral  -continue Cymbalta 40 mg daily  -continue gabapentin 100 mg BID for anxiety for now  -continue Seroquel 150 mg HS for now  -Clonazepam 1 mg daily  4.	I/G/M therapy with DBT  5.	Medical: RVP for URI sxs--negative and no sxs today  6.	Collateral/Dispo:   -obtained from Dad and psychiatrist  -dispo when stable-likely benefit from DBT referral

## 2023-12-21 NOTE — BH INPATIENT PSYCHIATRY PROGRESS NOTE - NSBHATTESTBILLING_PSY_A_CORE
61267-Kfnsnhgslu OBS or IP - moderate complexity OR 35-49 mins 57716-Pldwyekoil OBS or IP - moderate complexity OR 35-49 mins

## 2023-12-21 NOTE — BH PSYCHOLOGY - GROUP THERAPY NOTE - NSPSYCHOLGRPDBTPT_PSY_A_CORE FT
DBT Group is a group in which patients learn skills to better manage their emotions and behaviors. Group begins with a mindfulness practice so that patients have an opportunity to practice observing their internal and external experiences. Following the mindfulness exercise the group learns new skills in a didactic format. Group today focused on the “distress tolerance” module, which are skills to help patients manage their crisis urges. Specifically, patients learned “TIPP” skills, which are skills to use when patients are highly distressed (at least 70/100) and are unable to think effectively to use other skills. These skills involve “Tipping” body chemistry through using temperature, intense exercise, paced breathing and progressive muscle relaxation. Each skill was reviewed and patients practiced progressive muscle relaxation and paced breathing in the session. Patients were encouraged to practice these skills while on the unit.

## 2023-12-21 NOTE — BH INPATIENT PSYCHIATRY PROGRESS NOTE - NSBHFUPINTERVALHXFT_PSY_A_CORE
Chart reviewed. Case d/w interdisciplinary team. Patient seen and examined. No acute events overnight, no PRNs required or requested. Compliant with standing medications. Slept per sleep log. No physical complaints.    Pt complains mostly of feeling of "brain fog," today. Describes mood as "alright" meaning mellow and chill. Energy fluctuates, but emotionally feels tired of trying to get better. Still feels hopeless but is trying to distract her thoughts and focus on other things instead of dwelling. Passive SI persists but denies active SI/plan/intent. Finds no value, point or purpose in life. Motivation fluctuates and getting small amounts of enjoyment from things but still preferring to be alone given intense social pressure for her in social interactions.     Reports sleeping okay last night. Appetite and PO intake are stable. No concerns for medication side effects.

## 2023-12-22 PROCEDURE — 99233 SBSQ HOSP IP/OBS HIGH 50: CPT

## 2023-12-22 PROCEDURE — 90853 GROUP PSYCHOTHERAPY: CPT

## 2023-12-22 RX ORDER — DULOXETINE HYDROCHLORIDE 30 MG/1
60 CAPSULE, DELAYED RELEASE ORAL DAILY
Refills: 0 | Status: DISCONTINUED | OUTPATIENT
Start: 2023-12-25 | End: 2024-01-08

## 2023-12-22 RX ORDER — DULOXETINE HYDROCHLORIDE 30 MG/1
40 CAPSULE, DELAYED RELEASE ORAL DAILY
Refills: 0 | Status: COMPLETED | OUTPATIENT
Start: 2023-12-23 | End: 2023-12-24

## 2023-12-22 RX ADMIN — GABAPENTIN 100 MILLIGRAM(S): 400 CAPSULE ORAL at 09:20

## 2023-12-22 RX ADMIN — QUETIAPINE FUMARATE 150 MILLIGRAM(S): 200 TABLET, FILM COATED ORAL at 20:36

## 2023-12-22 RX ADMIN — GABAPENTIN 100 MILLIGRAM(S): 400 CAPSULE ORAL at 20:36

## 2023-12-22 RX ADMIN — Medication 1 MILLIGRAM(S): at 09:20

## 2023-12-22 RX ADMIN — DULOXETINE HYDROCHLORIDE 40 MILLIGRAM(S): 30 CAPSULE, DELAYED RELEASE ORAL at 09:20

## 2023-12-22 NOTE — BH INPATIENT PSYCHIATRY PROGRESS NOTE - NSBHASSESSSUMMFT_PSY_ALL_CORE
Assessment:    23F, college student at [employer] (recent withdrawal), single, domiciled with parents and sister with a PPHX of MDD, GEORGE, social anxiety disorder, currently in treatment at [facility], 1 psych hosp (11/7-11/15/19, for depression and poor affect regulation), no history of SA or NSSIB, no history of violence/aggression/legal issues, no history of substance use, PMH sig for PCOS, who presents to ED BIB by father and at recommendation of psychiatrist for low mood and passive thoughts of death in setting of school-related stressors.    Ddx MDD recurrent, severe without psychotic features, GEORGE, Social Anxiety D/O, r/o personality disorder    Today pt reports continued prominent depressive symptoms including hopelessness, fluctuating anhedonia, amotivation, worthlessness and passive SI. Willfullness again decreased today, with small increases in interest in skill use. Denies active SI today, tolerating medication changes.    Given significant continued anxiety and depression with predominant dysregulation, hopelessness and anhedonia pt requires continued hospitalization for safety and stabilization.    Plan:  1.	Legal: continue 9.13 admission  2.	Safety: routine obs appropriate as pt denying active SI/HI; Haldol/Ativan/Benadryl PRN agitation  3.	Psychiatric: continue home meds for now pending collateral  -continue Cymbalta 40 mg daily; if tolerating plan to increase to 60 mg on Mon  -continue gabapentin 100 mg BID for anxiety for now  -continue Seroquel 150 mg HS for now  -Clonazepam 1 mg daily  4.	I/G/M therapy with DBT  5.	Medical: no acute issues  6.	Collateral/Dispo:   -obtained from Dad and psychiatrist  -dispo when stable-likely benefit from DBT referral   Assessment:    23F, college student at [employer] (recent withdrawal), single, domiciled with parents and sister with a PPHX of MDD, GEORGE, social anxiety disorder, currently in treatment at [facility], 1 psych hosp (11/7-11/15/19, for depression and poor affect regulation), no history of SA or NSSIB, no history of violence/aggression/legal issues, no history of substance use, PMH sig for PCOS, who presents to ED BIB by father and at recommendation of psychiatrist for low mood and passive thoughts of death in setting of school-related stressors.    Ddx MDD recurrent, severe without psychotic features, GEORGE, Social Anxiety D/O, r/o personality disorder    Today pt reports continued prominent depressive symptoms including hopelessness, fluctuating anhedonia, amotivation, worthlessness and passive SI. Willfullness again decreased today, with small increases in interest in skill use. Denies active SI today, tolerating medication changes.    Given significant continued anxiety and depression with predominant dysregulation, hopelessness and anhedonia pt requires continued hospitalization for safety and stabilization.    Plan:  1.	Legal: continue 9.13 admission  2.	Safety: routine obs appropriate as pt denying active SI/HI; Haldol/Ativan/Benadryl PRN agitation  3.	Psychiatric: continue home meds for now pending collateral  -continue Cymbalta 40 mg daily; if tolerating plan to increase to 60 mg on Mon  -continue gabapentin 100 mg BID for anxiety for now  -continue Seroquel 150 mg HS for now  -Clonazepam 1 mg daily  4.	I/G/M therapy with DBT  5.	Medical: no acute issues  6.	Collateral/Dispo:   -obtained from Dad and psychiatrist  -dispo when stable-likely benefit from DBT referral

## 2023-12-22 NOTE — BH PSYCHOLOGY - GROUP THERAPY NOTE - NSPSYCHOLGRPDBTPT_PSY_A_CORE FT
DBT Group is a group in which patients learn skills to better manage their emotions and behaviors. Group begins with a mindfulness practice so that patients have an opportunity to practice observing their internal and external experiences.  Following the mindfulness exercise the group learns new skills in a didactic format. Group today focused on the “emotion regulation” module.  Specifically, patients learned the skills of “PLEASE,” or taking care of the mind by taking care of the body. This skill involves maintaining consistent treatment for physical illness, balanced eating, avoidance of mood-altering substances, balanced sleep, and exercise.  provided examples and suggestions of ways to improve these areas, and patients were encouraged to engage in discussion of ways they can prioritize and implement this skill.

## 2023-12-22 NOTE — BH INPATIENT PSYCHIATRY PROGRESS NOTE - NSBHFUPINTERVALHXFT_PSY_A_CORE
Chart reviewed. Case d/w interdisciplinary team. Patient seen and examined. No acute events overnight, no PRNs required or requested. Compliant with standing medications. Slept per sleep log. No physical complaints. Per staff pt continues to present as depressed but is more engaged.    Today pt has difficulty explaining how she is feeling, however when directed to diary card reports sadness at 4/5, some manjinder yesterday when listening to music, high levels of misery and intermittent passive SI. No urges for NSSIB and no active SI/intent/plan. Continues to struggle with motivation, hopelessness, low self-compassion and negative cognitive distortions. Still concerned about "spacing out."    Reports sleeping okay last night with fair energy. Appetite and PO intake are stable. No concerns for medication side effects.

## 2023-12-22 NOTE — BH INPATIENT PSYCHIATRY PROGRESS NOTE - NSBHATTESTBILLING_PSY_A_CORE
49514-Fphevgwywe OBS or IP - high complexity OR 50-79 mins 61157-Tsappkxiii OBS or IP - high complexity OR 50-79 mins

## 2023-12-23 RX ADMIN — QUETIAPINE FUMARATE 150 MILLIGRAM(S): 200 TABLET, FILM COATED ORAL at 20:51

## 2023-12-23 RX ADMIN — Medication 1 MILLIGRAM(S): at 09:55

## 2023-12-23 RX ADMIN — GABAPENTIN 100 MILLIGRAM(S): 400 CAPSULE ORAL at 20:51

## 2023-12-23 RX ADMIN — Medication 25 MILLIGRAM(S): at 14:20

## 2023-12-23 RX ADMIN — GABAPENTIN 100 MILLIGRAM(S): 400 CAPSULE ORAL at 09:54

## 2023-12-23 RX ADMIN — DULOXETINE HYDROCHLORIDE 40 MILLIGRAM(S): 30 CAPSULE, DELAYED RELEASE ORAL at 09:56

## 2023-12-24 RX ORDER — LANOLIN ALCOHOL/MO/W.PET/CERES
5 CREAM (GRAM) TOPICAL AT BEDTIME
Refills: 0 | Status: DISCONTINUED | OUTPATIENT
Start: 2023-12-24 | End: 2024-01-08

## 2023-12-24 RX ADMIN — Medication 1 MILLIGRAM(S): at 22:04

## 2023-12-24 RX ADMIN — QUETIAPINE FUMARATE 150 MILLIGRAM(S): 200 TABLET, FILM COATED ORAL at 20:05

## 2023-12-24 RX ADMIN — Medication 1 MILLIGRAM(S): at 08:28

## 2023-12-24 RX ADMIN — DULOXETINE HYDROCHLORIDE 40 MILLIGRAM(S): 30 CAPSULE, DELAYED RELEASE ORAL at 08:29

## 2023-12-24 RX ADMIN — GABAPENTIN 100 MILLIGRAM(S): 400 CAPSULE ORAL at 20:05

## 2023-12-24 RX ADMIN — Medication 25 MILLIGRAM(S): at 20:54

## 2023-12-24 RX ADMIN — GABAPENTIN 100 MILLIGRAM(S): 400 CAPSULE ORAL at 08:28

## 2023-12-24 RX ADMIN — Medication 5 MILLIGRAM(S): at 22:04

## 2023-12-25 RX ADMIN — Medication 1 MILLIGRAM(S): at 09:06

## 2023-12-25 RX ADMIN — GABAPENTIN 100 MILLIGRAM(S): 400 CAPSULE ORAL at 09:05

## 2023-12-25 RX ADMIN — GABAPENTIN 100 MILLIGRAM(S): 400 CAPSULE ORAL at 20:29

## 2023-12-25 RX ADMIN — Medication 25 MILLIGRAM(S): at 20:28

## 2023-12-25 RX ADMIN — DULOXETINE HYDROCHLORIDE 60 MILLIGRAM(S): 30 CAPSULE, DELAYED RELEASE ORAL at 09:05

## 2023-12-25 RX ADMIN — QUETIAPINE FUMARATE 150 MILLIGRAM(S): 200 TABLET, FILM COATED ORAL at 20:28

## 2023-12-26 PROCEDURE — 99232 SBSQ HOSP IP/OBS MODERATE 35: CPT

## 2023-12-26 RX ADMIN — Medication 650 MILLIGRAM(S): at 20:58

## 2023-12-26 RX ADMIN — Medication 25 MILLIGRAM(S): at 22:13

## 2023-12-26 RX ADMIN — GABAPENTIN 100 MILLIGRAM(S): 400 CAPSULE ORAL at 09:03

## 2023-12-26 RX ADMIN — QUETIAPINE FUMARATE 150 MILLIGRAM(S): 200 TABLET, FILM COATED ORAL at 20:28

## 2023-12-26 RX ADMIN — GABAPENTIN 100 MILLIGRAM(S): 400 CAPSULE ORAL at 20:28

## 2023-12-26 RX ADMIN — Medication 1 MILLIGRAM(S): at 09:03

## 2023-12-26 RX ADMIN — DULOXETINE HYDROCHLORIDE 60 MILLIGRAM(S): 30 CAPSULE, DELAYED RELEASE ORAL at 09:03

## 2023-12-26 RX ADMIN — Medication 650 MILLIGRAM(S): at 21:58

## 2023-12-26 RX ADMIN — Medication 650 MILLIGRAM(S): at 14:49

## 2023-12-26 NOTE — BH TREATMENT PLAN - NSBHPRIMARYDX_PSY_ALL_CORE
Major depressive disorder, recurrent    

## 2023-12-26 NOTE — BH TREATMENT PLAN - NSDCCRITERIA_PSY_ALL_CORE
improvement in depression, hope, and self care

## 2023-12-26 NOTE — BH INPATIENT PSYCHIATRY PROGRESS NOTE - NSBHFUPINTERVALHXFT_PSY_A_CORE
Chart reviewed. Case d/w interdisciplinary team. Patient seen and examined. No acute events overnight, requesting atarax daily over the weekend. Compliant with standing medications. Slept per sleep log. No physical complaints. Per staff pt continues to present as depressed but brighter when interacting with peers.    Today pt states she has been having a lot of trouble with worry and negative thoughts at night. Wants to avoid her parents so asked them not to visit. Ruminating over what she has done w/ her life, loneliness, future worries and feels misery anger and sadness. Feels hopeless and doesn't want to try to get better. ADLs are decreasing and appetite is also decreasing. Reports intense passive SI and fantasizing about ways she can die but denies active SI/plan/intent. Sleep and energy are fair. Feels that anything positive is a lie, minimal skill use.    No concerns for medication side effects.

## 2023-12-26 NOTE — BH INPATIENT PSYCHIATRY PROGRESS NOTE - NSBHASSESSSUMMFT_PSY_ALL_CORE
Assessment:    23F, college student at [employer] (recent withdrawal), single, domiciled with parents and sister with a PPHX of MDD, GEORGE, social anxiety disorder, currently in treatment at [facility], 1 psych hosp (11/7-11/15/19, for depression and poor affect regulation), no history of SA or NSSIB, no history of violence/aggression/legal issues, no history of substance use, PMH sig for PCOS, who presents to ED BIB by father and at recommendation of psychiatrist for low mood and passive thoughts of death in setting of school-related stressors.    Ddx MDD recurrent, severe without psychotic features, GEORGE, Social Anxiety D/O, r/o personality disorder    Today pt reports continued prominent depressive symptoms including hopelessness, fluctuating anhedonia, amotivation, worthlessness and passive SI. Willfullness again increased today, with decreased skill use and isolation from family supports. Denies active SI today, tolerating medication changes.    Given significant continued anxiety and depression with predominant dysregulation, hopelessness and anhedonia pt requires continued hospitalization for safety and stabilization.    Plan:  1.	Legal: continue 9.13 admission  2.	Safety: routine obs appropriate as pt denying active SI/HI; Haldol/Ativan/Benadryl PRN agitation  3.	Psychiatric: continue home meds for now pending collateral  -continue Cymbalta 60 mg daily  -continue gabapentin 100 mg BID for anxiety for now  -continue Seroquel 150 mg HS for now  -Clonazepam 1 mg daily  4.	I/G/M therapy with DBT  5.	Medical: no acute issues  6.	Collateral/Dispo:   -obtained from Dad and psychiatrist  -dispo when stable-likely benefit from DBT referral   Assessment:    23F, college student at [employer] (recent withdrawal), single, domiciled with parents and sister with a PPHX of MDD, GEORGE, social anxiety disorder, currently in treatment at [facility], 1 psych hosp (11/7-11/15/19, for depression and poor affect regulation), no history of SA or NSSIB, no history of violence/aggression/legal issues, no history of substance use, PMH sig for PCOS, who presents to ED BIB by father and at recommendation of psychiatrist for low mood and passive thoughts of death in setting of school-related stressors.    Ddx MDD recurrent, severe without psychotic features, GEORGE, Social Anxiety D/O, r/o personality disorder    Today pt reports continued prominent depressive symptoms including hopelessness, fluctuating anhedonia, amotivation, worthlessness and passive SI. Willfullness again increased today, with decreased skill use and isolation from family supports. Denies active SI today, tolerating medication changes.    Given significant continued anxiety and depression with predominant dysregulation, hopelessness and anhedonia pt requires continued hospitalization for safety and stabilization.    Plan:  1.	Legal: continue 9.13 admission  2.	Safety: routine obs appropriate as pt denying active SI/HI; Haldol/Ativan/Benadryl PRN agitation  3.	Psychiatric: continue home meds for now pending collateral  -continue Cymbalta 60 mg daily  -continue gabapentin 100 mg BID for anxiety for now  -continue Seroquel 150 mg HS for now  -Clonazepam 1 mg daily  4.	I/G/M therapy with DBT  5.	Medical: no acute issues  6.	Collateral/Dispo:   -obtained from Dad and psychiatrist  -dispo when stable-likely benefit from DBT referral

## 2023-12-26 NOTE — BH TREATMENT PLAN - NSTXPLANTHERAPYSESSIONSFT_PSY_ALL_CORE
12-22-23  Type of therapy: Cognitive behavior therapy, Dialectical behavior therapy, Coping skills, Creative arts therapy, Leisure development, Mindfulness, Music therapy, Peer advocate, Pet therapy  Type of session: Individual  Level of patient participation: Engaged  Duration of participation: 30 minutes  Therapy conducted by: Psych rehab  Therapy Summary: Writer met with patient for an individual session in order to review progress towards psychiatric rehabilitation goals. Patient was verbal and forthcoming during session. Patient is engaged in unit activities. Patient was not a behavioral management issue during past 7 days.     Patient has attended approximately 98 percent of psychiatric rehabilitation groups over the past seven days. Patient has demonstrated improvement in mood. Patient reports decrease in resistance to treatment. Patient reports she has been feeling better since the day of admission. However, patient reports she continues to endorse passive SI without P/I. Patient reports from time to time she continues to have intrusive thoughts. Patient denies NSSIB. Patient reports overall her mood is better and she has been feeling "a little more hopeful" about her future. Writer explored coping skills with patient. Patient reports coping skills such as willingness, TIPP skills, listening to music and mindfulness. Writer encouraged patient to continue to explore, utilize, and strengthen effective and healthy coping skills. Patient was receptive. Patient reports medication compliance over the past 7 days. Patient has been visible in the unit and has been appropriate with peers and staff.

## 2023-12-26 NOTE — BH PSYCHOLOGY - GROUP THERAPY NOTE - NSPSYCHOLGRPDBTPT_PSY_A_CORE FT
DBT Group is a group in which patients learn skills to better manage their emotions and behaviors. Group begins with a mindfulness practice so that patients have an opportunity to practice observing their internal and external experiences.  Following the mindfulness exercise the group learns new skills in a didactic format. Group today focused on the “emotion regulation” module.  Specifically, patients learned the skills of Accumulating Positives in the long term, or accumulating positive emotions in the long term to build a "life worth living".  provided examples and suggestions of ways to improve these areas, and patients were encouraged to engage in discussion of ways they can prioritize and implement this skill.

## 2023-12-26 NOTE — BH TREATMENT PLAN - NSCMSPTSTRENGTHS_PSY_ALL_CORE
Compliance to treatment/Physically healthy/Supportive family
Compliance to treatment/Physically healthy/Supportive family
Physically healthy/Supportive family

## 2023-12-26 NOTE — BH TREATMENT PLAN - NSTXANXINTERMD_PSY_ALL_CORE
cross from effexor to cymbalta,  gapabentin, support I/G/M therapy
cross from effexor to cymbalta, add gapabentin, support I/G/M therapy
trial of cymbalta,  gapabentin, support I/G/M therapy

## 2023-12-26 NOTE — BH TREATMENT PLAN - NSTXDCFAMINTERSW_PSY_ALL_CORE
Writer will work with patient to connect supports in the community
Writer will work with patient to connect with supports in the community
Writer will work with patient to connect supports in the community

## 2023-12-26 NOTE — BH TREATMENT PLAN - NSTXCAREGIVERPARTICIPATE_PSY_P_CORE
Family/Caregiver participated in identification of needs/problems/goals for treatment/Family/Caregiver participated in defining interventions

## 2023-12-26 NOTE — BH TREATMENT PLAN - NSTXCOPEINTERPR_PSY_ALL_CORE
Patient’s psychiatric rehabilitation goal is to meet with staff individually and attend psychiatric rehabilitation groups, in order for patient to identify 2 healthy coping skills for better symptom management and sustained recovery within 7 days.
Psychiatric rehabilitation staff will continue to meet patient individually to provide support, encouragement, and counseling in order for patient to attend daily symptom management groups and identify 2 healthy coping skills for improved symptom management and sustained recovery over seven days.
Patient’s psychiatric rehabilitation goal is to meet with staff individually and attend psychiatric rehabilitation groups, in order for patient to identify 2 healthy coping skills for better symptom management and sustained recovery within 7 days.

## 2023-12-26 NOTE — BH INPATIENT PSYCHIATRY PROGRESS NOTE - NSBHATTESTBILLING_PSY_A_CORE
08043-Pjrjbleyzr OBS or IP - moderate complexity OR 35-49 mins 88335-Mzzpflcnkv OBS or IP - moderate complexity OR 35-49 mins

## 2023-12-26 NOTE — BH TREATMENT PLAN - NSTXPATIENTPARTICIPATE_PSY_ALL_CORE
Patient participated in identification of needs/problems/goals for treatment/Patient participated in defining interventions

## 2023-12-26 NOTE — BH TREATMENT PLAN - NSTXDEPRESINTERMD_PSY_ALL_CORE
cross from effexor to cymbalta, support I/G/M therapy
trial of cymbalta, support I/G/M therapy
cross from effexor to cymbalta, support I/G/M therapy

## 2023-12-26 NOTE — BH TREATMENT PLAN - NSTXCOPEINTERMD_PSY_ALL_CORE
encourage I/G/M therapy and skills use

## 2023-12-27 PROCEDURE — 99233 SBSQ HOSP IP/OBS HIGH 50: CPT

## 2023-12-27 RX ORDER — CLONAZEPAM 1 MG
1 TABLET ORAL DAILY
Refills: 0 | Status: DISCONTINUED | OUTPATIENT
Start: 2023-12-27 | End: 2024-01-02

## 2023-12-27 RX ORDER — GABAPENTIN 400 MG/1
300 CAPSULE ORAL
Refills: 0 | Status: DISCONTINUED | OUTPATIENT
Start: 2023-12-27 | End: 2023-12-29

## 2023-12-27 RX ADMIN — QUETIAPINE FUMARATE 150 MILLIGRAM(S): 200 TABLET, FILM COATED ORAL at 20:38

## 2023-12-27 RX ADMIN — GABAPENTIN 100 MILLIGRAM(S): 400 CAPSULE ORAL at 09:22

## 2023-12-27 RX ADMIN — Medication 1 MILLIGRAM(S): at 09:22

## 2023-12-27 RX ADMIN — GABAPENTIN 300 MILLIGRAM(S): 400 CAPSULE ORAL at 20:38

## 2023-12-27 RX ADMIN — DULOXETINE HYDROCHLORIDE 60 MILLIGRAM(S): 30 CAPSULE, DELAYED RELEASE ORAL at 09:22

## 2023-12-27 NOTE — BH INPATIENT PSYCHIATRY PROGRESS NOTE - NSBHASSESSSUMMFT_PSY_ALL_CORE
Assessment:    23F, college student at [employer] (recent withdrawal), single, domiciled with parents and sister with a PPHX of MDD, GEORGE, social anxiety disorder, currently in treatment at [facility], 1 psych hosp (11/7-11/15/19, for depression and poor affect regulation), no history of SA or NSSIB, no history of violence/aggression/legal issues, no history of substance use, PMH sig for PCOS, who presents to ED BIB by father and at recommendation of psychiatrist for low mood and passive thoughts of death in setting of school-related stressors.    Ddx MDD recurrent, severe without psychotic features, GEORGE, Social Anxiety D/O, r/o BPD    Today pt reports continued prominent depressive symptoms including hopelessness, fluctuating anhedonia, amotivation, worthlessness and passive SI. Willfullness again increased today, with multiple statements about not wanting to get better and wanting to be discharged despite inability to care for self and newly reported active SI today. Tolerating medication changes but hopeless re: their utility.    Given significant continued anxiety and depression with predominant dysregulation, hopelessness and anhedonia pt requires continued hospitalization for safety and stabilization.    Plan:  1.	Legal: continue 9.13 admission  2.	Safety: routine obs appropriate as pt denying active SI/HI; Haldol/Ativan/Benadryl PRN agitation  3.	Psychiatric: continue home meds for now pending collateral  -continue Cymbalta 60 mg daily  -increase gabapentin to 300 mg BID for anxiety for now  -continue Seroquel 150 mg HS for now  -Clonazepam 1 mg daily  4.	I/G/M therapy with DBT  5.	Medical: no acute issues  6.	Collateral/Dispo:   -obtained from Dad and psychiatrist  -dispo when stable-likely benefit from DBT referral   Assessment:    23F, college student at [employer] (recent withdrawal), single, domiciled with parents and sister with a PPHX of MDD, GEORGE, social anxiety disorder, currently in treatment at [facility], 1 psych hosp (11/7-11/15/19, for depression and poor affect regulation), no history of SA or NSSIB, no history of violence/aggression/legal issues, no history of substance use, PMH sig for PCOS, who presents to ED BIB by father and at recommendation of psychiatrist for low mood and passive thoughts of death in setting of school-related stressors.    Ddx MDD recurrent, severe without psychotic features, GEORGE, Social Anxiety D/O, r/o BPD    Today pt reports continued prominent depressive symptoms including hopelessness, fluctuating anhedonia, amotivation, worthlessness and passive SI. Willfullness again increased today, with multiple statements about not wanting to get better and wanting to be discharged despite inability to care for self and newly reported active SI today. Tolerating medication changes but hopeless re: their utility.    Given significant continued anxiety and depression with predominant dysregulation, hopelessness and anhedonia pt requires continued hospitalization for safety and stabilization.    Plan:  1.	Legal: continue 9.13 admission  2.	Safety: routine obs appropriate as pt denying active SI/HI; Haldol/Ativan/Benadryl PRN agitation  3.	Psychiatric: continue home meds for now pending collateral  -continue Cymbalta 60 mg daily  -increase gabapentin to 300 mg BID for anxiety for now  -continue Seroquel 150 mg HS for now  -Clonazepam 1 mg daily  4.	I/G/M therapy with DBT  5.	Medical: no acute issues  6.	Collateral/Dispo:   -obtained from Dad and psychiatrist  -dispo when stable-likely benefit from DBT referral

## 2023-12-27 NOTE — BH INPATIENT PSYCHIATRY PROGRESS NOTE - NSBHATTESTBILLING_PSY_A_CORE
44302-Biltpwzebv OBS or IP - high complexity OR 50-79 mins 56483-Udrgrilljr OBS or IP - high complexity OR 50-79 mins

## 2023-12-27 NOTE — BH INPATIENT PSYCHIATRY PROGRESS NOTE - NSBHFUPINTERVALHXFT_PSY_A_CORE
Chart reviewed. Case d/w interdisciplinary team. Patient seen and examined. No acute events overnight, requested tylenol x 2 and atarax. Compliant with standing medications. Slept per sleep log. No physical complaints. Per staff pt continues to present as depressed.    Pt reports she became frustrated last night when discussing her request for discharge and hopelessness about treatment with a nurse. States she doesn't want to feel better, and has made a choice that she doesn't want to try or participate in treatment. feels she should be discharged as she is a bad patient and taking a bed away from someone who needs it. Feels like when she was angry last night was the first time she was being honest, but since then has had lots of negative catastrophizing thoughts re: how other hospital staff feel about her. Wants "out of this realm and out of my body." States she has no goals. Reports passive SI as well as active SI. Has ideas for ways to kill herself outside of the hospital but doesn't want to tell MD b/c she feels it will interfere with discharge. She denies any suicidal methods/plan/intent while inpatient. Feels she is not good, not worthy.     Doesn't want to take any medications as they don't work. No medication SE reported. Sleeping well. appetite and PO intake fair.    Today pt states she has been having a lot of trouble with worry and negative thoughts at night. Wants to avoid her parents so asked them not to visit. Ruminating over what she has done w/ her life, loneliness, future worries and feels misery anger and sadness. Feels hopeless and doesn't want to try to get better. ADLs are decreasing and appetite is also decreasing. Reports intense passive SI and fantasizing about ways she can die but denies active SI/plan/intent. Sleep and energy are fair. Feels that anything positive is a lie, minimal skill use.    No concerns for medication side effects.

## 2023-12-28 PROCEDURE — 99232 SBSQ HOSP IP/OBS MODERATE 35: CPT

## 2023-12-28 PROCEDURE — 90853 GROUP PSYCHOTHERAPY: CPT

## 2023-12-28 RX ADMIN — DULOXETINE HYDROCHLORIDE 60 MILLIGRAM(S): 30 CAPSULE, DELAYED RELEASE ORAL at 12:07

## 2023-12-28 RX ADMIN — Medication 25 MILLIGRAM(S): at 16:38

## 2023-12-28 RX ADMIN — QUETIAPINE FUMARATE 150 MILLIGRAM(S): 200 TABLET, FILM COATED ORAL at 20:48

## 2023-12-28 RX ADMIN — GABAPENTIN 300 MILLIGRAM(S): 400 CAPSULE ORAL at 20:47

## 2023-12-28 RX ADMIN — Medication 1 MILLIGRAM(S): at 12:07

## 2023-12-28 RX ADMIN — GABAPENTIN 300 MILLIGRAM(S): 400 CAPSULE ORAL at 12:08

## 2023-12-28 NOTE — BH INPATIENT PSYCHIATRY PROGRESS NOTE - NSBHFUPINTERVALHXFT_PSY_A_CORE
Chart reviewed. Case d/w interdisciplinary team. Patient seen and examined. No acute events overnight, no PRNs required or requested. Compliant with standing medications. Slept per sleep log. No physical complaints.    Pt reports feeling tired this AM despite sleeping well thru the night. She has otherwise been focusing more on what she can do when she leaves the hospital. Was inspired by [employer] so wants to return to yoga as it gives her some manjinder. Had a good visit with Dad and sister where they brainstormed more pleasurable activities she can do after discharge including music lessons. Plans to take spring semester off from school and only 3 classes in the fall. Feels less hopeless, more motivated. Reports decrease in passive SI and denies active SI/plan/intent. Mood is "not good, not bad, more cheerful." Appetite and PO intake are good. Willingness is increased.     Discussed dx of BPD-pt reports mood lability, some impulsivity, difficulty managing anger, identity diffusion, emptiness, black and white thinking, stress related paranoia, chronic SI.     Reports she has metallic taste in her mouth intermittently throughout the day. No other c/f medication side effects.

## 2023-12-28 NOTE — BH INPATIENT PSYCHIATRY PROGRESS NOTE - NSBHASSESSSUMMFT_PSY_ALL_CORE
Assessment:    23F, college student at [employer] (recent withdrawal), single, domiciled with parents and sister with a PPHX of MDD, GEORGE, social anxiety disorder, currently in treatment at [facility], 1 psych hosp (11/7-11/15/19, for depression and poor affect regulation), no history of SA or NSSIB, no history of violence/aggression/legal issues, no history of substance use, PMH sig for PCOS, who presents to ED BIB by father and at recommendation of psychiatrist for low mood and passive thoughts of death in setting of school-related stressors.    Ddx MDD recurrent, severe without psychotic features, GEORGE, Social Anxiety D/O, BPD    Today pt reports improvement in hopelessness, anhedonia, amotivation, worthlessness and passive SI. Willingness increased today, with increased future oriented content. Tolerating medication changes but with concern today for dysgeusia and sedation.    Given significant continued anxiety and depression with predominant dysregulation, hopelessness and anhedonia pt requires continued hospitalization for safety and stabilization.    Plan:  1.	Legal: continue 9.13 admission  2.	Safety: routine obs appropriate as pt denying active SI/HI; Haldol/Ativan/Benadryl PRN agitation  3.	Psychiatric: continue home meds for now pending collateral  -continue Cymbalta 60 mg daily  -continue gabapentin 300 mg BID for anxiety for now-monitor for sedation  -continue Seroquel 150 mg HS for now  -Clonazepam 1 mg daily  4.	I/G/M therapy with DBT  5.	Medical: no acute issues  6.	Collateral/Dispo:   -obtained from Dad and psychiatrist  -dispo when stable-likely benefit from PHP then DBT referral   Assessment:    23F, college student at [employer] (recent withdrawal), single, domiciled with parents and sister with a PPHX of MDD, GEORGE, social anxiety disorder, currently in treatment at [facility], 1 psych hosp (11/7-11/15/19, for depression and poor affect regulation), no history of SA or NSSIB, no history of violence/aggression/legal issues, no history of substance use, PMH sig for PCOS, who presents to ED BIB by father and at recommendation of psychiatrist for low mood and passive thoughts of death in setting of school-related stressors.    Ddx MDD recurrent, severe without psychotic features, GEORGE, Social Anxiety D/O, BPD    Today pt reports improvement in hopelessness, anhedonia, amotivation, worthlessness and passive SI. Willingness increased today, with increased future oriented content. Tolerating medication changes but with concern today for dysgeusia and sedation.    Given significant continued anxiety and depression with predominant dysregulation, hopelessness and anhedonia pt requires continued hospitalization for safety and stabilization.    Plan:  1.	Legal: continue 9.13 admission  2.	Safety: routine obs appropriate as pt denying active SI/HI; Haldol/Ativan/Benadryl PRN agitation  3.	Psychiatric: continue home meds for now pending collateral  -continue Cymbalta 60 mg daily  -continue gabapentin 300 mg BID for anxiety for now-monitor for sedation  -continue Seroquel 150 mg HS for now  -Clonazepam 1 mg daily  4.	I/G/M therapy with DBT  5.	Medical: no acute issues  6.	Collateral/Dispo:   -obtained from Dad and psychiatrist  -dispo when stable-likely benefit from PHP then DBT referral

## 2023-12-28 NOTE — BH INPATIENT PSYCHIATRY PROGRESS NOTE - NSBHATTESTBILLING_PSY_A_CORE
60413-Gdicusxtne OBS or IP - moderate complexity OR 35-49 mins [id number]-Qxdsaqjtrm OBS or IP - moderate complexity OR 35-49 mins

## 2023-12-29 PROCEDURE — 99233 SBSQ HOSP IP/OBS HIGH 50: CPT

## 2023-12-29 RX ORDER — SALIVA SUBSTITUTE COMB NO.11 351 MG
5 POWDER IN PACKET (EA) MUCOUS MEMBRANE
Refills: 0 | Status: DISCONTINUED | OUTPATIENT
Start: 2023-12-29 | End: 2024-01-08

## 2023-12-29 RX ORDER — GABAPENTIN 400 MG/1
200 CAPSULE ORAL
Refills: 0 | Status: DISCONTINUED | OUTPATIENT
Start: 2023-12-29 | End: 2024-01-08

## 2023-12-29 RX ADMIN — Medication 1 MILLIGRAM(S): at 08:44

## 2023-12-29 RX ADMIN — GABAPENTIN 200 MILLIGRAM(S): 400 CAPSULE ORAL at 21:02

## 2023-12-29 RX ADMIN — GABAPENTIN 300 MILLIGRAM(S): 400 CAPSULE ORAL at 08:44

## 2023-12-29 RX ADMIN — Medication 650 MILLIGRAM(S): at 15:07

## 2023-12-29 RX ADMIN — QUETIAPINE FUMARATE 150 MILLIGRAM(S): 200 TABLET, FILM COATED ORAL at 21:02

## 2023-12-29 RX ADMIN — DULOXETINE HYDROCHLORIDE 60 MILLIGRAM(S): 30 CAPSULE, DELAYED RELEASE ORAL at 08:44

## 2023-12-29 NOTE — BH INPATIENT PSYCHIATRY PROGRESS NOTE - NSBHFUPINTERVALHXFT_PSY_A_CORE
Chart reviewed. Case d/w interdisciplinary team. Patient seen and examined. Requested atarax in the afternoon yesterday due to anxiety (see below). Compliant with standing medications. Slept per sleep log. No physical complaints.    Pt reports feeling tired again this AM despite sleeping well thru the night. She took an atarax yesterday afternoon for anxiety triggered by learning that a friend had texted her about leaving for school on 1/2. She was able to use skills to regulate her anxiety and problem solve about how she wanted to respond. Other than this reports mood as good and steady. Is more hopeful with more future oriented thinking. Focus is improved. Some guilt remains. Getting some enjoyment and seeing some inc in motivation. Denies passive SI and denies active SI/plan/intent.  Appetite and PO intake are good. Willingness is increased.       Reports she has metallic taste in her mouth intermittently throughout the day. No other c/f medication side effects.

## 2023-12-29 NOTE — BH INPATIENT PSYCHIATRY PROGRESS NOTE - NSBHATTESTBILLING_PSY_A_CORE
88869-Aivioecnxf OBS or IP - high complexity OR 50-79 mins [id number]-Kmvimmkquz OBS or IP - high complexity OR 50-79 mins

## 2023-12-29 NOTE — BH INPATIENT PSYCHIATRY PROGRESS NOTE - NSBHASSESSSUMMFT_PSY_ALL_CORE
Assessment:    23F, college student at [employer] (recent withdrawal), single, domiciled with parents and sister with a PPHX of MDD, GEORGE, social anxiety disorder, currently in treatment at [facility], 1 psych hosp (11/7-11/15/19, for depression and poor affect regulation), no history of SA or NSSIB, no history of violence/aggression/legal issues, no history of substance use, PMH sig for PCOS, who presents to ED BIB by father and at recommendation of psychiatrist for low mood and passive thoughts of death in setting of school-related stressors.    Ddx MDD recurrent, severe without psychotic features, GEORGE, Social Anxiety D/O, BPD    Today pt reports continued improvement in hopelessness, anhedonia, amotivation, worthlessness and passive SI. Willingness increased today, with increased discussion of skill use and mood improvement. Tolerating medication changes but with concern again today for dysgeusia and sedation.    Given significant continued anxiety and depression with predominant dysregulation, hopelessness and anhedonia pt requires continued hospitalization for safety and stabilization.    Plan:  1.	Legal: continue 9.13 admission  2.	Safety: routine obs appropriate as pt denying active SI/HI; Haldol/Ativan/Benadryl PRN agitation  3.	Psychiatric: continue home meds for now pending collateral  -continue Cymbalta 60 mg daily  -decrease gabapentin to 200 mg BID for anxiety for now-given sedation  -continue Seroquel 150 mg HS for now  -Clonazepam 1 mg daily  4.	I/G/M therapy with DBT  5.	Medical: no acute issues  6.	Collateral/Dispo:   -obtained from Dad and psychiatrist  -dispo when stable-likely benefit from PHP then DBT referral   Assessment:    23F, college student at [employer] (recent withdrawal), single, domiciled with parents and sister with a PPHX of MDD, GEORGE, social anxiety disorder, currently in treatment at [facility], 1 psych hosp (11/7-11/15/19, for depression and poor affect regulation), no history of SA or NSSIB, no history of violence/aggression/legal issues, no history of substance use, PMH sig for PCOS, who presents to ED BIB by father and at recommendation of psychiatrist for low mood and passive thoughts of death in setting of school-related stressors.    Ddx MDD recurrent, severe without psychotic features, GEORGE, Social Anxiety D/O, BPD    Today pt reports continued improvement in hopelessness, anhedonia, amotivation, worthlessness and passive SI. Willingness increased today, with increased discussion of skill use and mood improvement. Tolerating medication changes but with concern again today for dysgeusia and sedation.    Given significant continued anxiety and depression with predominant dysregulation, hopelessness and anhedonia pt requires continued hospitalization for safety and stabilization.    Plan:  1.	Legal: continue 9.13 admission  2.	Safety: routine obs appropriate as pt denying active SI/HI; Haldol/Ativan/Benadryl PRN agitation  3.	Psychiatric: continue home meds for now pending collateral  -continue Cymbalta 60 mg daily  -decrease gabapentin to 200 mg BID for anxiety for now-given sedation  -continue Seroquel 150 mg HS for now  -Clonazepam 1 mg daily  4.	I/G/M therapy with DBT  5.	Medical: no acute issues  6.	Collateral/Dispo:   -obtained from Dad and psychiatrist  -dispo when stable-likely benefit from PHP then DBT referral

## 2023-12-30 RX ADMIN — Medication 25 MILLIGRAM(S): at 13:25

## 2023-12-30 RX ADMIN — QUETIAPINE FUMARATE 150 MILLIGRAM(S): 200 TABLET, FILM COATED ORAL at 20:53

## 2023-12-30 RX ADMIN — Medication 1 MILLIGRAM(S): at 09:59

## 2023-12-30 RX ADMIN — GABAPENTIN 200 MILLIGRAM(S): 400 CAPSULE ORAL at 20:53

## 2023-12-30 RX ADMIN — GABAPENTIN 200 MILLIGRAM(S): 400 CAPSULE ORAL at 09:59

## 2023-12-30 RX ADMIN — DULOXETINE HYDROCHLORIDE 60 MILLIGRAM(S): 30 CAPSULE, DELAYED RELEASE ORAL at 09:59

## 2023-12-31 RX ADMIN — QUETIAPINE FUMARATE 150 MILLIGRAM(S): 200 TABLET, FILM COATED ORAL at 20:26

## 2023-12-31 RX ADMIN — DULOXETINE HYDROCHLORIDE 60 MILLIGRAM(S): 30 CAPSULE, DELAYED RELEASE ORAL at 09:41

## 2023-12-31 RX ADMIN — Medication 25 MILLIGRAM(S): at 14:17

## 2023-12-31 RX ADMIN — GABAPENTIN 200 MILLIGRAM(S): 400 CAPSULE ORAL at 20:26

## 2023-12-31 RX ADMIN — Medication 650 MILLIGRAM(S): at 10:12

## 2023-12-31 RX ADMIN — Medication 1 MILLIGRAM(S): at 09:41

## 2023-12-31 RX ADMIN — GABAPENTIN 200 MILLIGRAM(S): 400 CAPSULE ORAL at 09:41

## 2024-01-01 RX ADMIN — QUETIAPINE FUMARATE 150 MILLIGRAM(S): 200 TABLET, FILM COATED ORAL at 21:01

## 2024-01-01 RX ADMIN — DULOXETINE HYDROCHLORIDE 60 MILLIGRAM(S): 30 CAPSULE, DELAYED RELEASE ORAL at 08:54

## 2024-01-01 RX ADMIN — Medication 650 MILLIGRAM(S): at 17:02

## 2024-01-01 RX ADMIN — Medication 1 MILLIGRAM(S): at 08:56

## 2024-01-01 RX ADMIN — GABAPENTIN 200 MILLIGRAM(S): 400 CAPSULE ORAL at 08:55

## 2024-01-01 RX ADMIN — GABAPENTIN 200 MILLIGRAM(S): 400 CAPSULE ORAL at 21:01

## 2024-01-02 PROCEDURE — 99232 SBSQ HOSP IP/OBS MODERATE 35: CPT

## 2024-01-02 PROCEDURE — 90853 GROUP PSYCHOTHERAPY: CPT

## 2024-01-02 RX ORDER — CLONAZEPAM 1 MG
0.5 TABLET ORAL DAILY
Refills: 0 | Status: DISCONTINUED | OUTPATIENT
Start: 2024-01-03 | End: 2024-01-04

## 2024-01-02 RX ADMIN — Medication 25 MILLIGRAM(S): at 13:15

## 2024-01-02 RX ADMIN — Medication 1 MILLIGRAM(S): at 09:27

## 2024-01-02 RX ADMIN — GABAPENTIN 200 MILLIGRAM(S): 400 CAPSULE ORAL at 21:05

## 2024-01-02 RX ADMIN — Medication 25 MILLIGRAM(S): at 21:05

## 2024-01-02 RX ADMIN — GABAPENTIN 200 MILLIGRAM(S): 400 CAPSULE ORAL at 09:28

## 2024-01-02 RX ADMIN — DULOXETINE HYDROCHLORIDE 60 MILLIGRAM(S): 30 CAPSULE, DELAYED RELEASE ORAL at 09:28

## 2024-01-02 RX ADMIN — QUETIAPINE FUMARATE 150 MILLIGRAM(S): 200 TABLET, FILM COATED ORAL at 21:05

## 2024-01-02 NOTE — BH INPATIENT PSYCHIATRY PROGRESS NOTE - NSBHATTESTBILLING_PSY_A_CORE
53426-Qifyemvphb OBS or IP - moderate complexity OR 35-49 mins 22288-Fdnwblrkwn OBS or IP - moderate complexity OR 35-49 mins

## 2024-01-02 NOTE — BH INPATIENT PSYCHIATRY PROGRESS NOTE - NSBHASSESSSUMMFT_PSY_ALL_CORE
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    Ddx MDD recurrent, GEORGE, Social Anxiety D/O, BPD    Pt working through difficult interactions with peers by attempting skill application with frustrating results today, encouraging efforts. Pt also noting that she feels sedated in the AM on 1mg of Klonopin so will decrease to 0.5mg daily.     Given significant continued anxiety and depression with predominant dysregulation, hopelessness and anhedonia pt requires continued hospitalization for safety and stabilization.    Plan:  1.	Legal: continue 9.13 admission  2.	Safety: routine obs appropriate as pt denying active SI/HI; Haldol/Ativan/Benadryl PRN agitation  3.	Psychiatric: continue home meds for now pending collateral  -continue Cymbalta 60 mg daily  -decrease gabapentin to 200 mg BID for anxiety for now-given sedation  -continue Seroquel 150 mg HS for now  -Clonazepam 1 mg daily will decrease to 0.5mg on 1/3.   4.	I/G/M therapy with DBT  5.	Medical: no acute issues  6.	Collateral/Dispo:   -obtained from Dad and psychiatrist  -dispo when stable-likely benefit from PHP then DBT referral   23F, college student at [employer] (recent withdrawal), single, domiciled with parents and sister with a PPHX of MDD, GEORGE, social anxiety disorder, currently in treatment at [facility], 1 psych hosp (11/7-11/15/19, for depression and poor affect regulation), no history of SA or NSSIB, no history of violence/aggression/legal issues, no history of substance use, PMH sig for PCOS, who presents to ED BIB by father and at recommendation of psychiatrist for low mood and passive thoughts of death in setting of school-related stressors.    Ddx MDD recurrent, GEORGE, Social Anxiety D/O, BPD    Pt working through difficult interactions with peers by attempting skill application with frustrating results today, encouraging efforts. Pt also noting that she feels sedated in the AM on 1mg of Klonopin so will decrease to 0.5mg daily.     Given significant continued anxiety and depression with predominant dysregulation, hopelessness and anhedonia pt requires continued hospitalization for safety and stabilization.    Plan:  1.	Legal: continue 9.13 admission  2.	Safety: routine obs appropriate as pt denying active SI/HI; Haldol/Ativan/Benadryl PRN agitation  3.	Psychiatric: continue home meds for now pending collateral  -continue Cymbalta 60 mg daily  -decrease gabapentin to 200 mg BID for anxiety for now-given sedation  -continue Seroquel 150 mg HS for now  -Clonazepam 1 mg daily will decrease to 0.5mg on 1/3.   4.	I/G/M therapy with DBT  5.	Medical: no acute issues  6.	Collateral/Dispo:   -obtained from Dad and psychiatrist  -dispo when stable-likely benefit from PHP then DBT referral

## 2024-01-02 NOTE — BH PSYCHOLOGY - GROUP THERAPY NOTE - NSPSYCHOLGRPDBTPT_PSY_A_CORE FT
DBT Group is a group in which patients learn skills to better manage their emotions and behaviors. Group begins with a mindfulness practice so that patients have an opportunity to practice observing their internal and external experiences.  Following the mindfulness exercise the group learns new skills in a didactic format. Group today focused on the “emotion regulation” module.  Specifically, patients learned the skill of Accumulating Positive Emotions in the Long Term by identifying values and translating those into goals and manageable action steps. Patients shared values that are important to them and how these translate into goals, as well as how they’ve begun to implement these.

## 2024-01-02 NOTE — BH INPATIENT PSYCHIATRY PROGRESS NOTE - NSBHFUPINTERVALHXFT_PSY_A_CORE
Chart reviewed. Case d/w interdisciplinary team. No notable weekend or overnight events, patient reported she was doing well this AM, but in afternoon had a negative interaction with a peer that made her feel quite low. Discussed this issue with patient who took well to encouragement and validation, discussed path to recovery and set backs to provide context for expected variations in moods and affects. Patient denies having SI or SIB related to the issue, and reports attempting to use skills discussed earlier in the day in therapy with varying results, these efforts were encouraged. Adherent with meds and denies side effects.

## 2024-01-03 PROCEDURE — 99232 SBSQ HOSP IP/OBS MODERATE 35: CPT

## 2024-01-03 RX ORDER — QUETIAPINE FUMARATE 200 MG/1
100 TABLET, FILM COATED ORAL AT BEDTIME
Refills: 0 | Status: DISCONTINUED | OUTPATIENT
Start: 2024-01-03 | End: 2024-01-08

## 2024-01-03 RX ADMIN — GABAPENTIN 200 MILLIGRAM(S): 400 CAPSULE ORAL at 21:03

## 2024-01-03 RX ADMIN — GABAPENTIN 200 MILLIGRAM(S): 400 CAPSULE ORAL at 09:21

## 2024-01-03 RX ADMIN — Medication 0.5 MILLIGRAM(S): at 09:21

## 2024-01-03 RX ADMIN — DULOXETINE HYDROCHLORIDE 60 MILLIGRAM(S): 30 CAPSULE, DELAYED RELEASE ORAL at 09:21

## 2024-01-03 RX ADMIN — QUETIAPINE FUMARATE 100 MILLIGRAM(S): 200 TABLET, FILM COATED ORAL at 21:03

## 2024-01-03 NOTE — BH INPATIENT PSYCHIATRY PROGRESS NOTE - NSBHASSESSSUMMFT_PSY_ALL_CORE
23F, college student at [employer] (recent withdrawal), single, domiciled with parents and sister with a PPHX of MDD, GEORGE, social anxiety disorder, currently in treatment at [facility], 1 psych hosp (11/7-11/15/19, for depression and poor affect regulation), no history of SA or NSSIB, no history of violence/aggression/legal issues, no history of substance use, PMH sig for PCOS, who presents to ED BIB by father and at recommendation of psychiatrist for low mood and passive thoughts of death in setting of school-related stressors.    Ddx MDD recurrent, GEORGE, Social Anxiety D/O, BPD    Continues to wax and wane with interpersonal anxiety, low self esteem, but overall trend towards improvement working towards plan for PHP early next week.     Given significant continued anxiety and depression with predominant dysregulation, hopelessness and anhedonia pt requires continued hospitalization for safety and stabilization.    Plan:  1.	Legal: continue 9.13 admission  2.	Safety: routine obs appropriate as pt denying active SI/HI; Haldol/Ativan/Benadryl PRN agitation  3.	Psychiatric: continue home meds for now pending collateral  -continue Cymbalta 60 mg daily  -decrease gabapentin to 200 mg BID for anxiety for now-given sedation  -continue Seroquel 150 mg HS -->decreasing to 100mg on 1/3 HS (due to AM sedation)  -Clonazepam 1 mg daily will decrease to 0.5mg on 1/3.   4.	I/G/M therapy with DBT  5.	Medical: no acute issues  6.	Collateral/Dispo:   -obtained from Dad and psychiatrist  -dispo when stable-likely benefit from PHP then DBT referral   23F, college student at [employer] (recent withdrawal), single, domiciled with parents and sister with a PPHX of MDD, GEORGE, social anxiety disorder, currently in treatment at [facility], 1 psych hosp (11/7-11/15/19, for depression and poor affect regulation), no history of SA or NSSIB, no history of violence/aggression/legal issues, no history of substance use, PMH sig for PCOS, who presents to ED BIB by father and at recommendation of psychiatrist for low mood and passive thoughts of death in setting of school-related stressors.    Ddx MDD recurrent, GEORGE, Social Anxiety D/O, BPD    Continues to wax and wane with interpersonal anxiety, low self esteem, but overall trend towards improvement working towards plan for PHP early next week.     Given significant continued anxiety and depression with predominant dysregulation, hopelessness and anhedonia pt requires continued hospitalization for safety and stabilization.    Plan:  1.	Legal: continue 9.13 admission  2.	Safety: routine obs appropriate as pt denying active SI/HI; Haldol/Ativan/Benadryl PRN agitation  3.	Psychiatric: continue home meds for now pending collateral  -continue Cymbalta 60 mg daily  -decrease gabapentin to 200 mg BID for anxiety for now-given sedation  -continue Seroquel 150 mg HS -->decreasing to 100mg on 1/3 HS (due to AM sedation)  -Clonazepam 1 mg daily will decrease to 0.5mg on 1/3.   4.	I/G/M therapy with DBT  5.	Medical: no acute issues  6.	Collateral/Dispo:   -obtained from Dad and psychiatrist  -dispo when stable-likely benefit from PHP then DBT referral

## 2024-01-03 NOTE — BH INPATIENT PSYCHIATRY PROGRESS NOTE - NSBHFUPINTERVALHXFT_PSY_A_CORE
Chart reviewed. Case d/w interdisciplinary team. No notable events last night, Pt slept well last night, took PRN atarax yesterday in evening for anxiety, but this AM is a bit brighter and more engaged in milieu. Pt continues to endorse feeling tired during day which writer suggested may be related to Seroquel HS dose, so agreed on plan to decrease tonight. Patient denies SI, still with thoughts of death periodically.

## 2024-01-03 NOTE — BH INPATIENT PSYCHIATRY PROGRESS NOTE - NSBHATTESTBILLING_PSY_A_CORE
86138-Hjszyynehr OBS or IP - moderate complexity OR 35-49 mins 37374-Rhapupiimo OBS or IP - moderate complexity OR 35-49 mins

## 2024-01-04 PROCEDURE — 99232 SBSQ HOSP IP/OBS MODERATE 35: CPT

## 2024-01-04 PROCEDURE — 90853 GROUP PSYCHOTHERAPY: CPT

## 2024-01-04 RX ADMIN — Medication 0.5 MILLIGRAM(S): at 08:55

## 2024-01-04 RX ADMIN — QUETIAPINE FUMARATE 100 MILLIGRAM(S): 200 TABLET, FILM COATED ORAL at 21:55

## 2024-01-04 RX ADMIN — GABAPENTIN 200 MILLIGRAM(S): 400 CAPSULE ORAL at 08:55

## 2024-01-04 RX ADMIN — DULOXETINE HYDROCHLORIDE 60 MILLIGRAM(S): 30 CAPSULE, DELAYED RELEASE ORAL at 08:55

## 2024-01-04 RX ADMIN — GABAPENTIN 200 MILLIGRAM(S): 400 CAPSULE ORAL at 21:55

## 2024-01-04 RX ADMIN — Medication 25 MILLIGRAM(S): at 12:31

## 2024-01-04 NOTE — BH INPATIENT PSYCHIATRY PROGRESS NOTE - NSBHASSESSSUMMFT_PSY_ALL_CORE
23F, college student at [employer] (recent withdrawal), single, domiciled with parents and sister with a PPHX of MDD, GEORGE, social anxiety disorder, currently in treatment at [facility], 1 psych hosp (11/7-11/15/19, for depression and poor affect regulation), no history of SA or NSSIB, no history of violence/aggression/legal issues, no history of substance use, PMH sig for PCOS, who presents to ED BIB by father and at recommendation of psychiatrist for low mood and passive thoughts of death in setting of school-related stressors.    Ddx MDD recurrent, GEORGE, Social Anxiety D/O, BPD    Still w AM sedation, plan to dc klonopin for tomorrow. Continues to wax and wane with interpersonal anxiety, low self esteem, but overall trend towards improvement working towards plan for PHP early next week.     Given significant continued anxiety and depression with predominant dysregulation, hopelessness and anhedonia pt requires continued hospitalization for safety and stabilization.    Plan:  1.	Legal: continue 9.13 admission  2.	Safety: routine obs appropriate as pt denying active SI/HI; Haldol/Ativan/Benadryl PRN agitation  3.	Psychiatric: continue home meds for now pending collateral  -continue Cymbalta 60 mg daily  -decrease gabapentin to 200 mg BID for anxiety for now-given sedation  -continue Seroquel 150 mg HS -->decreasing to 100mg on 1/3 HS (due to AM sedation)  -Clonazepam 1 mg daily will decrease to 0.5mg on 1/3.   4.	I/G/M therapy with DBT  5.	Medical: no acute issues  6.	Collateral/Dispo:   -obtained from Dad and psychiatrist  -dispo when stable-likely benefit from PHP then DBT referral   23F, college student at [employer] (recent withdrawal), single, domiciled with parents and sister with a PPHX of MDD, GEORGE, social anxiety disorder, currently in treatment at [facility], 1 psych hosp (11/7-11/15/19, for depression and poor affect regulation), no history of SA or NSSIB, no history of violence/aggression/legal issues, no history of substance use, PMH sig for PCOS, who presents to ED BIB by father and at recommendation of psychiatrist for low mood and passive thoughts of death in setting of school-related stressors.    Ddx MDD recurrent, GEORGE, Social Anxiety D/O, BPD    Still w AM sedation, plan to dc klonopin for tomorrow. Continues to wax and wane with interpersonal anxiety, low self esteem, but overall trend towards improvement working towards plan for PHP early next week.     Given significant continued anxiety and depression with predominant dysregulation, hopelessness and anhedonia pt requires continued hospitalization for safety and stabilization.    Plan:  1.	Legal: continue 9.13 admission  2.	Safety: routine obs appropriate as pt denying active SI/HI; Haldol/Ativan/Benadryl PRN agitation  3.	Psychiatric: continue home meds for now pending collateral  -continue Cymbalta 60 mg daily  -decrease gabapentin to 200 mg BID for anxiety for now-given sedation  -continue Seroquel 150 mg HS -->decreasing to 100mg on 1/3 HS (due to AM sedation)  -Clonazepam 1 mg daily will decrease to 0.5mg on 1/3.   4.	I/G/M therapy with DBT  5.	Medical: no acute issues  6.	Collateral/Dispo:   -obtained from Dad and psychiatrist  -dispo when stable-likely benefit from PHP then DBT referral

## 2024-01-04 NOTE — ED ADULT NURSE NOTE - NS_BH TRG QUESTION2_ED_ALL_ED
AdmissionCare    Guideline: Dehydration - INPT, Inpatient    Based on the indications selected for the patient, the bed status of Admit to Inpatient was determined to be MET    The following indications were selected as present at the time of evaluation of the patient:      Dehydration that is severe or persistent, as indicated by 1 or more of the following:   -     Dehydration that is persistent, as indicated by ALL of the following:    -      - Oral rehydration therapy not tolerated or insufficient to adequately correct dehydration     - Appropriate intravenous treatment (eg, fluids) does not readily correct dehydration.    - Electrolyte abnormality is not at acceptable patient baseline (eg, treatment effect).    AdmissionCare documentation entered by: RENAN Olea    [facility], 27th edition, Copyright © 2023 [facility], [facility] All Rights Reserved.  [id number] Agitated/uncooperative BUT NOT combative/assaultive

## 2024-01-04 NOTE — BH PSYCHOLOGY - GROUP THERAPY NOTE - NSBHPSYCHOLRESPONSE_PSY_A_CORE
Coping skills acquired/Insight displayed/Accepted support
Coping skills acquired/Accepted support
Coping skills acquired/Insight displayed/Accepted support

## 2024-01-04 NOTE — BH PSYCHOLOGY - GROUP THERAPY NOTE - NSBHPSYCHOLASSESSPROV_PSY_A_CORE
Licensed Psychologist
Licensed Psychologist and Psychology Trainee
Psychology Trainee only
Licensed Psychologist
Licensed Psychologist
Licensed Psychologist and Psychology Trainee
Licensed Psychologist and Psychology Trainee
Licensed Psychologist
Licensed Psychologist and Psychology Trainee
Licensed Psychologist
Licensed Psychologist and Psychology Trainee
Licensed Psychologist and Psychology Trainee

## 2024-01-04 NOTE — BH PSYCHOLOGY - GROUP THERAPY NOTE - NSPSYCHOLGRPCOGINT_PSY_A_CORE
Leave splint in place until seen by orthopedics. Continue taking Motrin for pain and swelling. Use Norco as needed for breakthrough pain. Do not drive or operate machinery while taking narcotic pain medication. Call orthopedics on Monday to establish follow-up. Return to the ER for worsening or worrisome symptoms.
other..

## 2024-01-04 NOTE — BH PSYCHOLOGY - GROUP THERAPY NOTE - NSPSYCHOLGRPDBTPT_PSY_A_CORE
stable mood and affect in group/patient showing good behavior control/Patient able to identify mood states/other...

## 2024-01-04 NOTE — BH PSYCHOLOGY - GROUP THERAPY NOTE - NSPSYCHOLGRPDBTINT_PSY_A_CORE FT
taught emotion regulation skill 
taught mindfulness skill 
taught emotion regulation skill 
taught emotion regulation skill 
taught distress tolerance skill 
taught emotion regulation skill 
taught distress tolerance skill 
taught emotion regulation skill

## 2024-01-04 NOTE — BH PSYCHOLOGY - GROUP THERAPY NOTE - NSPSYCHOLGRPDBTINT_PSY_A_CORE
other...
reviewed distress tolerance, skills homework
other...

## 2024-01-04 NOTE — BH PSYCHOLOGY - GROUP THERAPY NOTE - NSBHPSYCHOLGRPNAME_PSY_A_CORE
DBT Skills
DBT Homework
DBT Skills
other...
DBT Skills
other...
DBT Skills
DBT Skills
other...
DBT Skills

## 2024-01-04 NOTE — BH INPATIENT PSYCHIATRY PROGRESS NOTE - NSBHFUPINTERVALHXFT_PSY_A_CORE
Chart reviewed. Case d/w interdisciplinary team. No notable events last night, Pt slept well, again reports feeling tired but had no problems going to sleep on lower dose of Seroquel. Patient reports some anxiety and low motivation for groups today 2/2 multiple factors but expresses intent to engage in groups later in day. Patient denies SI but wishes to try to dc AM klonopin to improve daytime sedation. Denies SI, looking forward to engaging w php next week.

## 2024-01-04 NOTE — BH PSYCHOLOGY - GROUP THERAPY NOTE - NSPSYCHOLGRPDBTPROB_PSY_A_CORE
anxiety/depressed mood/suicidal ideation

## 2024-01-04 NOTE — BH PSYCHOLOGY - GROUP THERAPY NOTE - NSPSYCHOLGRPDBTPT_PSY_A_CORE FT
DBT Group is a group in which patients learn skills to better manage their emotions and behaviors. Group begins with a mindfulness practice so that patients have an opportunity to practice observing their internal and external experiences.  Following the mindfulness exercise the group learns new skills in a didactic format. Group today focused on the “emotion regulation” module.  Specifically, patients learned Build Mastery and Hines Ahead and Hines Ahead. Patients were asked to identify an activity to engage in every day that would help increase their sense of competence and accomplishment (Build Mastery). They were also asked to identify potential difficult situations, identify skills to help manage these difficulties, and imagine coping effectively (Hines Ahead).  DBT Group is a group in which patients learn skills to better manage their emotions and behaviors. Group begins with a mindfulness practice so that patients have an opportunity to practice observing their internal and external experiences.  Following the mindfulness exercise the group learns new skills in a didactic format. Group today focused on the “emotion regulation” module.  Specifically, patients learned Build Mastery and [place] Ahead and [place] Ahead. Patients were asked to identify an activity to engage in every day that would help increase their sense of competence and accomplishment (Build Mastery). They were also asked to identify potential difficult situations, identify skills to help manage these difficulties, and imagine coping effectively ([place] Ahead).

## 2024-01-04 NOTE — BH INPATIENT PSYCHIATRY PROGRESS NOTE - NSBHATTESTBILLING_PSY_A_CORE
73318-Yjaqifvbtt OBS or IP - moderate complexity OR 35-49 mins 75644-Qjegnvntwb OBS or IP - moderate complexity OR 35-49 mins

## 2024-01-04 NOTE — BH PSYCHOLOGY - GROUP THERAPY NOTE - NSBHPSYCHOLPARTICIP_PSY_A_CORE
Fully engaged

## 2024-01-04 NOTE — BH PSYCHOLOGY - GROUP THERAPY NOTE - NSPSYCHOLGRPDBTGOAL_PSY_A_CORE
reduce mood and affective lability/reduce suicidal ideation/risk of attempt/improve ability to indentify feelings/improve ability to communicate feelings/reduce vulnerability to emotional dysregualation

## 2024-01-04 NOTE — BH PSYCHOLOGY - GROUP THERAPY NOTE - NSPSYCHOLGRPCOGGOAL_PSY_A_CORE
learn cognitive skills to improve coping with stress

## 2024-01-04 NOTE — BH PSYCHOLOGY - GROUP THERAPY NOTE - NSBHPSYCHOLGRPTYPE_PSY_A_CORE
Cognitive Behavioral Coping Skills
DBT Life Skills
Cognitive Behavioral Coping Skills
DBT Life Skills
Cognitive Behavioral Coping Skills
DBT Life Skills

## 2024-01-04 NOTE — BH PSYCHOLOGY - GROUP THERAPY NOTE - NSPSYCHOLGRPCOGPT_PSY_A_CORE
participated in exercise
participated in exercise
participated in exercise/shared negative coping experience/shared positive coping experience

## 2024-01-04 NOTE — BH PSYCHOLOGY - GROUP THERAPY NOTE - TOKEN PULL-DIAGNOSIS
Primary Diagnosis:  Major depressive disorder, recurrent [F33.9]      Major depression [F32.9]        Problem Dx:   Social anxiety disorder [F40.10]      GEORGE (generalized anxiety disorder) [F41.1]      

## 2024-01-04 NOTE — BH PSYCHOLOGY - GROUP THERAPY NOTE - NSPSYCHOLGRPCOGINT_PSY_A_CORE FT
[employer] is an ACT-based group in which patients learn skills and explore themes of identity, compassion, resilience, and connection through the lens of marginalized identity. Group begins with setting group expectations and introductions that focus on identity exploration. Following introductions, the daily theme is presented through both didactics and experiential activities. Group today focused on the theme “resilience.” Patients were provided psychoeducation about resilience and engaged in discussion about the importance of developing resilience to cope with stressful times.  led patients in an experiential exercise that focused on positive inner self-talk and growing resilience. 
[employer] is an ACT-based group in which patients learn skills and explore themes of identity, compassion, resilience, and connection through the lens of marginalized identity. Group begins with setting group expectations and introductions that focus on identity exploration. Following introductions, the daily theme is presented through both didactics and experiential activities. Group today focused on the theme “identity.” Patients were provided psychoeducation about identity and engaged in discussion about types of identity and the importance of values on identity formation.  provided experiential activities that allowed patients to engage in their own values exploration, determining the unique values that help to form their identities. 
[employer] is an ACT-based group in which patients learn skills and explore themes of identity, compassion, resilience, and connection through the lens of marginalized identity. Group begins with setting group expectations and introductions that focus on identity exploration. Following introductions, the daily theme is presented through both didactics and experiential activities. Group today focused on the theme “connection.” Patients were provided psychoeducation about connection and engaged in discussion about the importance of developing healthy relationships.  led patients in an experiential exercise titled “Taking a Relationship Inventory” and helped patients to connect their values to their relationships.

## 2024-01-04 NOTE — BH PSYCHOLOGY - GROUP THERAPY NOTE - NSBHPSYCHOLDISCUSS_PSY_A_CORE
Discussion with collaborating staff took place since patient's last visit

## 2024-01-05 PROCEDURE — 99232 SBSQ HOSP IP/OBS MODERATE 35: CPT

## 2024-01-05 RX ADMIN — GABAPENTIN 200 MILLIGRAM(S): 400 CAPSULE ORAL at 09:18

## 2024-01-05 RX ADMIN — QUETIAPINE FUMARATE 100 MILLIGRAM(S): 200 TABLET, FILM COATED ORAL at 20:22

## 2024-01-05 RX ADMIN — GABAPENTIN 200 MILLIGRAM(S): 400 CAPSULE ORAL at 20:22

## 2024-01-05 RX ADMIN — Medication 650 MILLIGRAM(S): at 21:20

## 2024-01-05 RX ADMIN — DULOXETINE HYDROCHLORIDE 60 MILLIGRAM(S): 30 CAPSULE, DELAYED RELEASE ORAL at 09:18

## 2024-01-05 RX ADMIN — Medication 650 MILLIGRAM(S): at 20:22

## 2024-01-05 RX ADMIN — Medication 25 MILLIGRAM(S): at 21:14

## 2024-01-05 NOTE — BH INPATIENT PSYCHIATRY PROGRESS NOTE - NSTXANXINTERMD_PSY_ALL_CORE
trial of cymbalta,  gapabentin, support I/G/M therapy
trial of cymbalta,  gapabentin, support I/G/M therapy
cross from effexor to cymbalta,  gapabentin, support I/G/M therapy
cross from effexor to cymbalta,  gapabentin, support I/G/M therapy
cross from effexor to cymbalta, add gapabentin, support I/G/M therapy
trial of cymbalta,  gapabentin, support I/G/M therapy
cross from effexor to cymbalta,  gapabentin, support I/G/M therapy
trial of cymbalta,  gapabentin, support I/G/M therapy
cross from effexor to cymbalta,  gapabentin, support I/G/M therapy
cross from effexor to cymbalta, add gapabentin, support I/G/M therapy

## 2024-01-05 NOTE — BH INPATIENT PSYCHIATRY PROGRESS NOTE - NSBHMETABOLIC_PSY_ALL_CORE_FT
BMI:   HbA1c: A1C with Estimated Average Glucose Result: 5.4 % (12-16-23 @ 14:46)    Glucose:   BP: --Vital Signs Last 24 Hrs  T(C): 35.8 (12-27-23 @ 07:59), Max: 37.1 (12-26-23 @ 20:56)  T(F): 96.5 (12-27-23 @ 07:59), Max: 98.7 (12-26-23 @ 20:56)  HR: --  BP: --  BP(mean): --  RR: 14 (12-27-23 @ 07:59) (14 - 14)  SpO2: --    Orthostatic VS  12-27-23 @ 07:59  Lying BP: --/-- HR: --  Sitting BP: 121/76 HR: --  Standing BP: 117/71 HR: 99  Site: --  Mode: --  Orthostatic VS  12-26-23 @ 06:39  Lying BP: --/-- HR: --  Sitting BP: 114/72 HR: 98  Standing BP: 122/66 HR: 112  Site: --  Mode: --    Lipid Panel: Date/Time: 12-16-23 @ 12:48  Cholesterol, Serum: 175  LDL Cholesterol Calculated: 115  HDL Cholesterol, Serum: 44  Total Cholesterol/HDL Ration Measurement: --  Triglycerides, Serum: 81  
BMI:   HbA1c: A1C with Estimated Average Glucose Result: 5.4 % (12-16-23 @ 14:46)    Glucose:   BP: 121/71 (01-01-24 @ 07:57) (121/71 - 121/71)Vital Signs Last 24 Hrs  T(C): 36.6 (01-03-24 @ 08:39), Max: 36.7 (01-02-24 @ 21:19)  T(F): 97.9 (01-03-24 @ 08:39), Max: 98.1 (01-02-24 @ 21:19)  HR: --  BP: --  BP(mean): --  RR: 17 (01-03-24 @ 08:39) (17 - 17)  SpO2: --    Orthostatic VS  01-03-24 @ 08:39  Lying BP: --/-- HR: --  Sitting BP: 124/79 HR: 103  Standing BP: 129/77 HR: 119  Site: --  Mode: --  Orthostatic VS  01-02-24 @ 08:37  Lying BP: --/-- HR: --  Sitting BP: 120/61 HR: 79  Standing BP: 125/66 HR: 81  Site: --  Mode: --    Lipid Panel: Date/Time: 12-16-23 @ 12:48  Cholesterol, Serum: 175  LDL Cholesterol Calculated: 115  HDL Cholesterol, Serum: 44  Total Cholesterol/HDL Ration Measurement: --  Triglycerides, Serum: 81  
BMI:   HbA1c: A1C with Estimated Average Glucose Result: 5.4 % (12-16-23 @ 14:46)    Glucose:   BP: --Vital Signs Last 24 Hrs  T(C): 36.2 (01-04-24 @ 06:32), Max: 36.7 (01-03-24 @ 21:06)  T(F): 97.2 (01-04-24 @ 06:32), Max: 98 (01-03-24 @ 21:06)  HR: --  BP: --  BP(mean): --  RR: 17 (01-04-24 @ 06:32) (17 - 17)  SpO2: --    Orthostatic VS  01-04-24 @ 06:32  Lying BP: --/-- HR: --  Sitting BP: 116/73 HR: 81  Standing BP: 119/69 HR: 79  Site: --  Mode: --  Orthostatic VS  01-03-24 @ 08:39  Lying BP: --/-- HR: --  Sitting BP: 124/79 HR: 103  Standing BP: 129/77 HR: 119  Site: --  Mode: --    Lipid Panel: Date/Time: 12-16-23 @ 12:48  Cholesterol, Serum: 175  LDL Cholesterol Calculated: 115  HDL Cholesterol, Serum: 44  Total Cholesterol/HDL Ration Measurement: --  Triglycerides, Serum: 81  
BMI:   HbA1c: A1C with Estimated Average Glucose Result: 5.4 % (12-16-23 @ 14:46)    Glucose:   BP: --Vital Signs Last 24 Hrs  T(C): 36.4 (12-29-23 @ 09:34), Max: 36.6 (12-28-23 @ 20:51)  T(F): 97.6 (12-29-23 @ 09:34), Max: 97.9 (12-28-23 @ 20:51)  HR: --  BP: --  BP(mean): --  RR: 16 (12-29-23 @ 09:34) (16 - 16)  SpO2: --    Orthostatic VS  12-29-23 @ 09:34  Lying BP: --/-- HR: --  Sitting BP: 96/83 HR: 99  Standing BP: 115/76 HR: 116  Site: --  Mode: --  Orthostatic VS  12-28-23 @ 06:29  Lying BP: --/-- HR: --  Sitting BP: 121/76 HR: 96  Standing BP: 119/66 HR: 111  Site: --  Mode: --    Lipid Panel: Date/Time: 12-16-23 @ 12:48  Cholesterol, Serum: 175  LDL Cholesterol Calculated: 115  HDL Cholesterol, Serum: 44  Total Cholesterol/HDL Ration Measurement: --  Triglycerides, Serum: 81  
BMI:   HbA1c:   Glucose:   BP: 108/72 (12-14-23 @ 07:07) (108/72 - 127/82)Vital Signs Last 24 Hrs  T(C): 36.4 (12-15-23 @ 08:22), Max: 37.3 (12-14-23 @ 20:49)  T(F): 97.6 (12-15-23 @ 08:22), Max: 99.1 (12-14-23 @ 20:49)  HR: --  BP: --  BP(mean): --  RR: 18 (12-15-23 @ 08:22) (18 - 18)  SpO2: --    Orthostatic VS  12-15-23 @ 08:22  Lying BP: --/-- HR: --  Sitting BP: 125/72 HR: 96  Standing BP: 124/73 HR: 113  Site: --  Mode: --    Lipid Panel: 
BMI:   HbA1c: A1C with Estimated Average Glucose Result: 5.4 % (12-16-23 @ 14:46)    Glucose:   BP: 121/79 (12-16-23 @ 06:29) (121/79 - 121/79)Vital Signs Last 24 Hrs  T(C): 36.2 (12-18-23 @ 08:14), Max: 37.1 (12-17-23 @ 20:48)  T(F): 97.1 (12-18-23 @ 08:14), Max: 98.7 (12-17-23 @ 20:48)  HR: --  BP: --  BP(mean): --  RR: 16 (12-18-23 @ 08:14) (16 - 16)  SpO2: --    Orthostatic VS  12-18-23 @ 08:14  Lying BP: --/-- HR: --  Sitting BP: 128/80 HR: 93  Standing BP: 129/65 HR: 115  Site: --  Mode: --  Orthostatic VS  12-18-23 @ 07:55  Lying BP: --/-- HR: --  Sitting BP: 128/80 HR: 93  Standing BP: 129/65 HR: 115  Site: --  Mode: --  Orthostatic VS  12-17-23 @ 08:25  Lying BP: --/-- HR: --  Sitting BP: 122/75 HR: 96  Standing BP: 124/66 HR: 113  Site: --  Mode: --    Lipid Panel: Date/Time: 12-16-23 @ 12:48  Cholesterol, Serum: 175  LDL Cholesterol Calculated: 115  HDL Cholesterol, Serum: 44  Total Cholesterol/HDL Ration Measurement: --  Triglycerides, Serum: 81  
BMI:   HbA1c: A1C with Estimated Average Glucose Result: 5.4 % (12-16-23 @ 14:46)    Glucose:   BP: --Vital Signs Last 24 Hrs  T(C): 36.1 (12-21-23 @ 08:13), Max: 37.1 (12-20-23 @ 20:59)  T(F): 97 (12-21-23 @ 08:13), Max: 98.7 (12-20-23 @ 20:59)  HR: --  BP: --  BP(mean): --  RR: 16 (12-21-23 @ 08:13) (16 - 16)  SpO2: --    Orthostatic VS  12-21-23 @ 08:13  Lying BP: --/-- HR: --  Sitting BP: 123/83 HR: 98  Standing BP: 121/798 HR: 106  Site: --  Mode: --  Orthostatic VS  12-20-23 @ 08:03  Lying BP: --/-- HR: --  Sitting BP: 120/89 HR: 93  Standing BP: 124/76 HR: 106  Site: --  Mode: --    Lipid Panel: Date/Time: 12-16-23 @ 12:48  Cholesterol, Serum: 175  LDL Cholesterol Calculated: 115  HDL Cholesterol, Serum: 44  Total Cholesterol/HDL Ration Measurement: --  Triglycerides, Serum: 81  
BMI:   HbA1c: A1C with Estimated Average Glucose Result: 5.4 % (12-16-23 @ 14:46)    Glucose:   BP: --Vital Signs Last 24 Hrs  T(C): 36.3 (12-20-23 @ 08:03), Max: 36.3 (12-20-23 @ 08:03)  T(F): 97.4 (12-20-23 @ 08:03), Max: 97.4 (12-20-23 @ 08:03)  HR: --  BP: --  BP(mean): --  RR: 16 (12-20-23 @ 08:03) (16 - 16)  SpO2: --    Orthostatic VS  12-20-23 @ 08:03  Lying BP: --/-- HR: --  Sitting BP: 120/89 HR: 93  Standing BP: 124/76 HR: 106  Site: --  Mode: --  Orthostatic VS  12-19-23 @ 06:40  Lying BP: --/-- HR: --  Sitting BP: 122/75 HR: 100  Standing BP: 127/67 HR: 100  Site: --  Mode: --    Lipid Panel: Date/Time: 12-16-23 @ 12:48  Cholesterol, Serum: 175  LDL Cholesterol Calculated: 115  HDL Cholesterol, Serum: 44  Total Cholesterol/HDL Ration Measurement: --  Triglycerides, Serum: 81  
BMI:   HbA1c: A1C with Estimated Average Glucose Result: 5.4 % (12-16-23 @ 14:46)    Glucose:   BP: --Vital Signs Last 24 Hrs  T(C): 36.3 (12-19-23 @ 06:40), Max: 36.3 (12-19-23 @ 06:40)  T(F): 97.3 (12-19-23 @ 06:40), Max: 97.3 (12-19-23 @ 06:40)  HR: --  BP: --  BP(mean): --  RR: 20 (12-19-23 @ 06:40) (20 - 20)  SpO2: --    Orthostatic VS  12-19-23 @ 06:40  Lying BP: --/-- HR: --  Sitting BP: 122/75 HR: 100  Standing BP: 127/67 HR: 100  Site: --  Mode: --  Orthostatic VS  12-18-23 @ 08:14  Lying BP: --/-- HR: --  Sitting BP: 128/80 HR: 93  Standing BP: 129/65 HR: 115  Site: --  Mode: --  Orthostatic VS  12-18-23 @ 07:55  Lying BP: --/-- HR: --  Sitting BP: 128/80 HR: 93  Standing BP: 129/65 HR: 115  Site: --  Mode: --    Lipid Panel: Date/Time: 12-16-23 @ 12:48  Cholesterol, Serum: 175  LDL Cholesterol Calculated: 115  HDL Cholesterol, Serum: 44  Total Cholesterol/HDL Ration Measurement: --  Triglycerides, Serum: 81  
BMI:   HbA1c: A1C with Estimated Average Glucose Result: 5.4 % (12-16-23 @ 14:46)    Glucose:   BP: --Vital Signs Last 24 Hrs  T(C): 36.5 (12-22-23 @ 06:46), Max: 36.7 (12-21-23 @ 22:02)  T(F): 97.7 (12-22-23 @ 06:46), Max: 98.1 (12-21-23 @ 22:02)  HR: --  BP: --  BP(mean): --  RR: 17 (12-22-23 @ 06:46) (17 - 17)  SpO2: --    Orthostatic VS  12-22-23 @ 06:46  Lying BP: --/-- HR: --  Sitting BP: 131/79 HR: 93  Standing BP: 116/73 HR: 109  Site: --  Mode: --  Orthostatic VS  12-21-23 @ 08:13  Lying BP: --/-- HR: --  Sitting BP: 123/83 HR: 98  Standing BP: 121/798 HR: 106  Site: --  Mode: --    Lipid Panel: Date/Time: 12-16-23 @ 12:48  Cholesterol, Serum: 175  LDL Cholesterol Calculated: 115  HDL Cholesterol, Serum: 44  Total Cholesterol/HDL Ration Measurement: --  Triglycerides, Serum: 81  
BMI:   HbA1c: A1C with Estimated Average Glucose Result: 5.4 % (12-16-23 @ 14:46)    Glucose:   BP: --Vital Signs Last 24 Hrs  T(C): 36.6 (01-05-24 @ 08:06), Max: 36.8 (01-04-24 @ 20:31)  T(F): 97.8 (01-05-24 @ 08:06), Max: 98.3 (01-04-24 @ 20:31)  HR: --  BP: --  BP(mean): --  RR: 18 (01-05-24 @ 08:06) (18 - 18)  SpO2: --    Orthostatic VS  01-05-24 @ 08:06  Lying BP: --/-- HR: --  Sitting BP: 122/78 HR: 105  Standing BP: 121/73 HR: 112  Site: --  Mode: --  Orthostatic VS  01-04-24 @ 06:32  Lying BP: --/-- HR: --  Sitting BP: 116/73 HR: 81  Standing BP: 119/69 HR: 79  Site: --  Mode: --    Lipid Panel: Date/Time: 12-16-23 @ 12:48  Cholesterol, Serum: 175  LDL Cholesterol Calculated: 115  HDL Cholesterol, Serum: 44  Total Cholesterol/HDL Ration Measurement: --  Triglycerides, Serum: 81  
BMI:   HbA1c: A1C with Estimated Average Glucose Result: 5.4 % (12-16-23 @ 14:46)    Glucose:   BP: --Vital Signs Last 24 Hrs  T(C): 37.1 (12-26-23 @ 20:56), Max: 37.1 (12-26-23 @ 20:56)  T(F): 98.7 (12-26-23 @ 20:56), Max: 98.7 (12-26-23 @ 20:56)  HR: --  BP: --  BP(mean): --  RR: 17 (12-26-23 @ 06:39) (17 - 17)  SpO2: --    Orthostatic VS  12-26-23 @ 06:39  Lying BP: --/-- HR: --  Sitting BP: 114/72 HR: 98  Standing BP: 122/66 HR: 112  Site: --  Mode: --  Orthostatic VS  12-25-23 @ 08:15  Lying BP: --/-- HR: --  Sitting BP: 133/80 HR: 97  Standing BP: 118/76 HR: 104  Site: --  Mode: --    Lipid Panel: Date/Time: 12-16-23 @ 12:48  Cholesterol, Serum: 175  LDL Cholesterol Calculated: 115  HDL Cholesterol, Serum: 44  Total Cholesterol/HDL Ration Measurement: --  Triglycerides, Serum: 81  
BMI:   HbA1c: A1C with Estimated Average Glucose Result: 5.4 % (12-16-23 @ 14:46)    Glucose:   BP: --Vital Signs Last 24 Hrs  T(C): 36.1 (12-28-23 @ 06:29), Max: 36.9 (12-27-23 @ 20:29)  T(F): 97 (12-28-23 @ 06:29), Max: 98.5 (12-27-23 @ 20:29)  HR: --  BP: --  BP(mean): --  RR: --  SpO2: --    Orthostatic VS  12-28-23 @ 06:29  Lying BP: --/-- HR: --  Sitting BP: 121/76 HR: 96  Standing BP: 119/66 HR: 111  Site: --  Mode: --  Orthostatic VS  12-27-23 @ 07:59  Lying BP: --/-- HR: --  Sitting BP: 121/76 HR: --  Standing BP: 117/71 HR: 99  Site: --  Mode: --    Lipid Panel: Date/Time: 12-16-23 @ 12:48  Cholesterol, Serum: 175  LDL Cholesterol Calculated: 115  HDL Cholesterol, Serum: 44  Total Cholesterol/HDL Ration Measurement: --  Triglycerides, Serum: 81  
BMI:   HbA1c:   Glucose:   BP: 121/79 (12-16-23 @ 06:29) (108/72 - 127/82)Vital Signs Last 24 Hrs  T(C): 36.9 (12-16-23 @ 06:29), Max: 37.1 (12-15-23 @ 20:55)  T(F): 98.4 (12-16-23 @ 06:29), Max: 98.8 (12-15-23 @ 20:55)  HR: 103 (12-16-23 @ 06:29) (103 - 103)  BP: 121/79 (12-16-23 @ 06:29) (121/79 - 121/79)  BP(mean): --  RR: 15 (12-16-23 @ 06:29) (15 - 15)  SpO2: --    Orthostatic VS  12-16-23 @ 06:29  Lying BP: --/-- HR: --  Sitting BP: --/-- HR: --  Standing BP: 124/79 HR: 93  Site: --  Mode: --  Orthostatic VS  12-15-23 @ 08:22  Lying BP: --/-- HR: --  Sitting BP: 125/72 HR: 96  Standing BP: 124/73 HR: 113  Site: --  Mode: --    Lipid Panel: 
BMI:   HbA1c: A1C with Estimated Average Glucose Result: 5.4 % (12-16-23 @ 14:46)    Glucose:   BP: 121/71 (01-01-24 @ 07:57) (121/71 - 121/71)Vital Signs Last 24 Hrs  T(C): 36.7 (01-02-24 @ 08:37), Max: 36.7 (01-01-24 @ 20:23)  T(F): 98.1 (01-02-24 @ 08:37), Max: 98.1 (01-02-24 @ 08:37)  HR: --  BP: --  BP(mean): --  RR: 16 (01-02-24 @ 08:37) (16 - 16)  SpO2: --    Orthostatic VS  01-02-24 @ 08:37  Lying BP: --/-- HR: --  Sitting BP: 120/61 HR: 79  Standing BP: 125/66 HR: 81  Site: --  Mode: --    Lipid Panel: Date/Time: 12-16-23 @ 12:48  Cholesterol, Serum: 175  LDL Cholesterol Calculated: 115  HDL Cholesterol, Serum: 44  Total Cholesterol/HDL Ration Measurement: --  Triglycerides, Serum: 81  
BMI:   HbA1c: A1C with Estimated Average Glucose Result: 5.4 % (12-16-23 @ 14:46)    Glucose:   BP: 121/79 (12-16-23 @ 06:29) (121/79 - 121/79)Vital Signs Last 24 Hrs  T(C): 36.6 (12-17-23 @ 08:25), Max: 36.7 (12-16-23 @ 20:35)  T(F): 97.8 (12-17-23 @ 08:25), Max: 98 (12-16-23 @ 20:35)  HR: --  BP: --  BP(mean): --  RR: 16 (12-17-23 @ 08:25) (16 - 16)  SpO2: --    Orthostatic VS  12-17-23 @ 08:25  Lying BP: --/-- HR: --  Sitting BP: 122/75 HR: 96  Standing BP: 124/66 HR: 113  Site: --  Mode: --  Orthostatic VS  12-16-23 @ 06:29  Lying BP: --/-- HR: --  Sitting BP: --/-- HR: --  Standing BP: 124/79 HR: 93  Site: --  Mode: --    Lipid Panel: Date/Time: 12-16-23 @ 12:48  Cholesterol, Serum: 175  LDL Cholesterol Calculated: 115  HDL Cholesterol, Serum: 44  Total Cholesterol/HDL Ration Measurement: --  Triglycerides, Serum: 81

## 2024-01-05 NOTE — BH INPATIENT PSYCHIATRY PROGRESS NOTE - NS ED [facility] REVIEW OF ED CHART AVAILABLE INVESTIGATIONS REVIEWED
None available

## 2024-01-05 NOTE — BH INPATIENT PSYCHIATRY PROGRESS NOTE - NSTXCOPEINTERMD_PSY_ALL_CORE
encourage I/G/M therapy and skills use

## 2024-01-05 NOTE — BH INPATIENT PSYCHIATRY PROGRESS NOTE - NSBHFUPINTERVALCCFT_PSY_A_CORE
"I had a tough interaction with someone so I feel bad" Pt seen for f/u for anxiety/mood
Pt seen for f/u for anxiety/mood
Pt seen f/u for depression
"I don't feel like doing anything."
Pt seen f/u for depression
"I'm trying to distract myself."
"I'm up and down still."
"I've made my choice, I don't want to get better."
"I had a really bad night."
"I just don't want to try."
"I've been thinking more about what I want to do when I get out of the hospital."
"I'm really up and down."
Pt seen for f/u for anxiety/mood
"Maybe I am willing to try skills."
"I got a little anxious last night but I think I handled it well."
Pt seen for f/u for anxiety/mood

## 2024-01-05 NOTE — BH INPATIENT PSYCHIATRY PROGRESS NOTE - NSICDXBHSECONDARYDX_PSY_ALL_CORE
GEORGE (generalized anxiety disorder)   F41.1  Social anxiety disorder   F40.10  

## 2024-01-05 NOTE — BH INPATIENT PSYCHIATRY PROGRESS NOTE - NSTXDCFAMDATETRGT_PSY_ALL_CORE
29-Dec-2023
22-Dec-2023
22-Dec-2023
29-Dec-2023
29-Dec-2023
05-Jan-2024
22-Dec-2023
12-Jan-2024
22-Dec-2023
05-Jan-2024
22-Dec-2023
29-Dec-2023
05-Jan-2024
05-Jan-2024
22-Dec-2023

## 2024-01-05 NOTE — BH INPATIENT PSYCHIATRY PROGRESS NOTE - NSICDXBHPRIMARYDX_PSY_ALL_CORE
Major depressive disorder, recurrent   F33.9  

## 2024-01-05 NOTE — BH INPATIENT PSYCHIATRY PROGRESS NOTE - NSBHATTESTTYPEVISIT_PSY_A_CORE
Attending Only
EARL without on-site Attending supervision
EARL without on-site Attending supervision

## 2024-01-05 NOTE — BH INPATIENT PSYCHIATRY PROGRESS NOTE - NSBHPROGMEDSE_PSY_A_CORE FT
? sedation, dysgeusia 

## 2024-01-05 NOTE — BH INPATIENT PSYCHIATRY PROGRESS NOTE - CURRENT MEDICATION
MEDICATIONS  (STANDING):  clonazePAM  Tablet 1 milliGRAM(s) Oral daily  DULoxetine 40 milliGRAM(s) Oral daily  gabapentin 100 milliGRAM(s) Oral two times a day  QUEtiapine 150 milliGRAM(s) Oral at bedtime    MEDICATIONS  (PRN):  acetaminophen     Tablet .. 650 milliGRAM(s) Oral every 6 hours PRN Mild Pain (1 - 3), Moderate Pain (4 - 6)  acetaminophen     Tablet .. 650 milliGRAM(s) Oral once PRN Temp greater or equal to 38C (100.4F), Mild Pain (1 - 3), Moderate Pain (4 - 6)  benzocaine/menthol Lozenge 1 Lozenge Oral every 3 hours PRN throat discomfort  diphenhydrAMINE 50 milliGRAM(s) Oral every 6 hours PRN agitation  diphenhydrAMINE Injectable 50 milliGRAM(s) IntraMuscular once PRN agitation  haloperidol     Tablet 5 milliGRAM(s) Oral every 6 hours PRN agitation  haloperidol    Injectable 5 milliGRAM(s) IntraMuscular once PRN Agitation  hydrOXYzine hydrochloride 25 milliGRAM(s) Oral every 6 hours PRN anxiety  LORazepam     Tablet 2 milliGRAM(s) Oral every 6 hours PRN Agitation  LORazepam   Injectable 2 milliGRAM(s) IntraMuscular Once PRN Agitation  
MEDICATIONS  (STANDING):  clonazePAM  Tablet 1 milliGRAM(s) Oral daily  DULoxetine 60 milliGRAM(s) Oral daily  gabapentin 300 milliGRAM(s) Oral two times a day  QUEtiapine 150 milliGRAM(s) Oral at bedtime    MEDICATIONS  (PRN):  acetaminophen     Tablet .. 650 milliGRAM(s) Oral every 6 hours PRN Mild Pain (1 - 3), Moderate Pain (4 - 6)  acetaminophen     Tablet .. 650 milliGRAM(s) Oral once PRN Temp greater or equal to 38C (100.4F), Mild Pain (1 - 3), Moderate Pain (4 - 6)  benzocaine/menthol Lozenge 1 Lozenge Oral every 3 hours PRN throat discomfort  diphenhydrAMINE 50 milliGRAM(s) Oral every 6 hours PRN agitation  diphenhydrAMINE Injectable 50 milliGRAM(s) IntraMuscular once PRN agitation  haloperidol     Tablet 5 milliGRAM(s) Oral every 6 hours PRN agitation  haloperidol    Injectable 5 milliGRAM(s) IntraMuscular once PRN Agitation  hydrOXYzine hydrochloride 25 milliGRAM(s) Oral every 6 hours PRN anxiety  LORazepam     Tablet 2 milliGRAM(s) Oral every 6 hours PRN Agitation  LORazepam   Injectable 2 milliGRAM(s) IntraMuscular Once PRN Agitation  melatonin. 5 milliGRAM(s) Oral at bedtime PRN Insomnia  
MEDICATIONS  (STANDING):  clonazePAM  Tablet 1 milliGRAM(s) Oral daily  DULoxetine 20 milliGRAM(s) Oral daily  gabapentin 100 milliGRAM(s) Oral two times a day  QUEtiapine 150 milliGRAM(s) Oral at bedtime  venlafaxine XR 37.5 milliGRAM(s) Oral daily    MEDICATIONS  (PRN):  acetaminophen     Tablet .. 650 milliGRAM(s) Oral every 6 hours PRN Mild Pain (1 - 3), Moderate Pain (4 - 6)  acetaminophen     Tablet .. 650 milliGRAM(s) Oral once PRN Temp greater or equal to 38C (100.4F), Mild Pain (1 - 3), Moderate Pain (4 - 6)  benzocaine/menthol Lozenge 1 Lozenge Oral every 3 hours PRN throat discomfort  diphenhydrAMINE 50 milliGRAM(s) Oral every 6 hours PRN agitation  diphenhydrAMINE Injectable 50 milliGRAM(s) IntraMuscular once PRN agitation  haloperidol     Tablet 5 milliGRAM(s) Oral every 6 hours PRN agitation  haloperidol    Injectable 5 milliGRAM(s) IntraMuscular once PRN Agitation  hydrOXYzine hydrochloride 25 milliGRAM(s) Oral every 6 hours PRN anxiety  LORazepam     Tablet 2 milliGRAM(s) Oral every 6 hours PRN Agitation  LORazepam   Injectable 2 milliGRAM(s) IntraMuscular Once PRN Agitation  
MEDICATIONS  (STANDING):  clonazePAM  Tablet 1 milliGRAM(s) Oral daily  DULoxetine 20 milliGRAM(s) Oral daily  gabapentin 100 milliGRAM(s) Oral two times a day  QUEtiapine 150 milliGRAM(s) Oral at bedtime    MEDICATIONS  (PRN):  acetaminophen     Tablet .. 650 milliGRAM(s) Oral every 6 hours PRN Mild Pain (1 - 3), Moderate Pain (4 - 6)  diphenhydrAMINE 50 milliGRAM(s) Oral every 6 hours PRN agitation  diphenhydrAMINE Injectable 50 milliGRAM(s) IntraMuscular once PRN agitation  haloperidol     Tablet 5 milliGRAM(s) Oral every 6 hours PRN agitation  haloperidol    Injectable 5 milliGRAM(s) IntraMuscular once PRN Agitation  hydrOXYzine hydrochloride 25 milliGRAM(s) Oral every 6 hours PRN anxiety  LORazepam     Tablet 2 milliGRAM(s) Oral every 6 hours PRN Agitation  LORazepam   Injectable 2 milliGRAM(s) IntraMuscular Once PRN Agitation  
MEDICATIONS  (STANDING):  clonazePAM  Tablet 1 milliGRAM(s) Oral daily  DULoxetine 60 milliGRAM(s) Oral daily  gabapentin 100 milliGRAM(s) Oral two times a day  QUEtiapine 150 milliGRAM(s) Oral at bedtime    MEDICATIONS  (PRN):  acetaminophen     Tablet .. 650 milliGRAM(s) Oral every 6 hours PRN Mild Pain (1 - 3), Moderate Pain (4 - 6)  acetaminophen     Tablet .. 650 milliGRAM(s) Oral once PRN Temp greater or equal to 38C (100.4F), Mild Pain (1 - 3), Moderate Pain (4 - 6)  benzocaine/menthol Lozenge 1 Lozenge Oral every 3 hours PRN throat discomfort  diphenhydrAMINE 50 milliGRAM(s) Oral every 6 hours PRN agitation  diphenhydrAMINE Injectable 50 milliGRAM(s) IntraMuscular once PRN agitation  haloperidol     Tablet 5 milliGRAM(s) Oral every 6 hours PRN agitation  haloperidol    Injectable 5 milliGRAM(s) IntraMuscular once PRN Agitation  hydrOXYzine hydrochloride 25 milliGRAM(s) Oral every 6 hours PRN anxiety  LORazepam     Tablet 2 milliGRAM(s) Oral every 6 hours PRN Agitation  LORazepam   Injectable 2 milliGRAM(s) IntraMuscular Once PRN Agitation  melatonin. 5 milliGRAM(s) Oral at bedtime PRN Insomnia  
MEDICATIONS  (STANDING):  DULoxetine 60 milliGRAM(s) Oral daily  gabapentin 200 milliGRAM(s) Oral two times a day  QUEtiapine 100 milliGRAM(s) Oral at bedtime    MEDICATIONS  (PRN):  acetaminophen     Tablet .. 650 milliGRAM(s) Oral every 6 hours PRN Mild Pain (1 - 3), Moderate Pain (4 - 6)  benzocaine/menthol Lozenge 1 Lozenge Oral every 3 hours PRN throat discomfort  Biotene Dry Mouth Oral Rinse 5 milliLiter(s) Swish and Spit two times a day PRN dry mouth  diphenhydrAMINE 50 milliGRAM(s) Oral every 6 hours PRN agitation  diphenhydrAMINE Injectable 50 milliGRAM(s) IntraMuscular once PRN agitation  haloperidol     Tablet 5 milliGRAM(s) Oral every 6 hours PRN agitation  haloperidol    Injectable 5 milliGRAM(s) IntraMuscular once PRN Agitation  hydrOXYzine hydrochloride 25 milliGRAM(s) Oral every 6 hours PRN anxiety  LORazepam     Tablet 2 milliGRAM(s) Oral every 6 hours PRN Agitation  LORazepam   Injectable 2 milliGRAM(s) IntraMuscular Once PRN Agitation  melatonin. 5 milliGRAM(s) Oral at bedtime PRN Insomnia  
MEDICATIONS  (STANDING):  clonazePAM  Tablet 1 milliGRAM(s) Oral daily  DULoxetine 20 milliGRAM(s) Oral daily  gabapentin 100 milliGRAM(s) Oral two times a day  QUEtiapine 150 milliGRAM(s) Oral at bedtime  venlafaxine XR 37.5 milliGRAM(s) Oral daily    MEDICATIONS  (PRN):  acetaminophen     Tablet .. 650 milliGRAM(s) Oral every 6 hours PRN Mild Pain (1 - 3), Moderate Pain (4 - 6)  acetaminophen     Tablet .. 650 milliGRAM(s) Oral once PRN Temp greater or equal to 38C (100.4F), Mild Pain (1 - 3), Moderate Pain (4 - 6)  benzocaine/menthol Lozenge 1 Lozenge Oral every 3 hours PRN throat discomfort  diphenhydrAMINE 50 milliGRAM(s) Oral every 6 hours PRN agitation  diphenhydrAMINE Injectable 50 milliGRAM(s) IntraMuscular once PRN agitation  haloperidol     Tablet 5 milliGRAM(s) Oral every 6 hours PRN agitation  haloperidol    Injectable 5 milliGRAM(s) IntraMuscular once PRN Agitation  hydrOXYzine hydrochloride 25 milliGRAM(s) Oral every 6 hours PRN anxiety  LORazepam     Tablet 2 milliGRAM(s) Oral every 6 hours PRN Agitation  LORazepam   Injectable 2 milliGRAM(s) IntraMuscular Once PRN Agitation  
MEDICATIONS  (STANDING):  clonazePAM  Tablet 0.5 milliGRAM(s) Oral daily  DULoxetine 60 milliGRAM(s) Oral daily  gabapentin 200 milliGRAM(s) Oral two times a day  QUEtiapine 100 milliGRAM(s) Oral at bedtime    MEDICATIONS  (PRN):  acetaminophen     Tablet .. 650 milliGRAM(s) Oral every 6 hours PRN Mild Pain (1 - 3), Moderate Pain (4 - 6)  benzocaine/menthol Lozenge 1 Lozenge Oral every 3 hours PRN throat discomfort  Biotene Dry Mouth Oral Rinse 5 milliLiter(s) Swish and Spit two times a day PRN dry mouth  diphenhydrAMINE 50 milliGRAM(s) Oral every 6 hours PRN agitation  diphenhydrAMINE Injectable 50 milliGRAM(s) IntraMuscular once PRN agitation  haloperidol     Tablet 5 milliGRAM(s) Oral every 6 hours PRN agitation  haloperidol    Injectable 5 milliGRAM(s) IntraMuscular once PRN Agitation  hydrOXYzine hydrochloride 25 milliGRAM(s) Oral every 6 hours PRN anxiety  LORazepam     Tablet 2 milliGRAM(s) Oral every 6 hours PRN Agitation  LORazepam   Injectable 2 milliGRAM(s) IntraMuscular Once PRN Agitation  melatonin. 5 milliGRAM(s) Oral at bedtime PRN Insomnia  
MEDICATIONS  (STANDING):  clonazePAM  Tablet 1 milliGRAM(s) Oral daily  DULoxetine 40 milliGRAM(s) Oral daily  gabapentin 100 milliGRAM(s) Oral two times a day  QUEtiapine 150 milliGRAM(s) Oral at bedtime    MEDICATIONS  (PRN):  acetaminophen     Tablet .. 650 milliGRAM(s) Oral every 6 hours PRN Mild Pain (1 - 3), Moderate Pain (4 - 6)  acetaminophen     Tablet .. 650 milliGRAM(s) Oral once PRN Temp greater or equal to 38C (100.4F), Mild Pain (1 - 3), Moderate Pain (4 - 6)  benzocaine/menthol Lozenge 1 Lozenge Oral every 3 hours PRN throat discomfort  diphenhydrAMINE 50 milliGRAM(s) Oral every 6 hours PRN agitation  diphenhydrAMINE Injectable 50 milliGRAM(s) IntraMuscular once PRN agitation  haloperidol     Tablet 5 milliGRAM(s) Oral every 6 hours PRN agitation  haloperidol    Injectable 5 milliGRAM(s) IntraMuscular once PRN Agitation  hydrOXYzine hydrochloride 25 milliGRAM(s) Oral every 6 hours PRN anxiety  LORazepam     Tablet 2 milliGRAM(s) Oral every 6 hours PRN Agitation  LORazepam   Injectable 2 milliGRAM(s) IntraMuscular Once PRN Agitation  
MEDICATIONS  (STANDING):  clonazePAM  Tablet 1 milliGRAM(s) Oral daily  DULoxetine 60 milliGRAM(s) Oral daily  gabapentin 300 milliGRAM(s) Oral two times a day  QUEtiapine 150 milliGRAM(s) Oral at bedtime    MEDICATIONS  (PRN):  acetaminophen     Tablet .. 650 milliGRAM(s) Oral every 6 hours PRN Mild Pain (1 - 3), Moderate Pain (4 - 6)  acetaminophen     Tablet .. 650 milliGRAM(s) Oral once PRN Temp greater or equal to 38C (100.4F), Mild Pain (1 - 3), Moderate Pain (4 - 6)  benzocaine/menthol Lozenge 1 Lozenge Oral every 3 hours PRN throat discomfort  diphenhydrAMINE 50 milliGRAM(s) Oral every 6 hours PRN agitation  diphenhydrAMINE Injectable 50 milliGRAM(s) IntraMuscular once PRN agitation  haloperidol     Tablet 5 milliGRAM(s) Oral every 6 hours PRN agitation  haloperidol    Injectable 5 milliGRAM(s) IntraMuscular once PRN Agitation  hydrOXYzine hydrochloride 25 milliGRAM(s) Oral every 6 hours PRN anxiety  LORazepam     Tablet 2 milliGRAM(s) Oral every 6 hours PRN Agitation  LORazepam   Injectable 2 milliGRAM(s) IntraMuscular Once PRN Agitation  melatonin. 5 milliGRAM(s) Oral at bedtime PRN Insomnia  
MEDICATIONS  (STANDING):  clonazePAM  Tablet 1 milliGRAM(s) Oral daily  gabapentin 100 milliGRAM(s) Oral two times a day  QUEtiapine 150 milliGRAM(s) Oral at bedtime    MEDICATIONS  (PRN):  acetaminophen     Tablet .. 650 milliGRAM(s) Oral every 6 hours PRN Mild Pain (1 - 3), Moderate Pain (4 - 6)  acetaminophen     Tablet .. 650 milliGRAM(s) Oral once PRN Temp greater or equal to 38C (100.4F), Mild Pain (1 - 3), Moderate Pain (4 - 6)  benzocaine/menthol Lozenge 1 Lozenge Oral every 3 hours PRN throat discomfort  diphenhydrAMINE 50 milliGRAM(s) Oral every 6 hours PRN agitation  diphenhydrAMINE Injectable 50 milliGRAM(s) IntraMuscular once PRN agitation  haloperidol     Tablet 5 milliGRAM(s) Oral every 6 hours PRN agitation  haloperidol    Injectable 5 milliGRAM(s) IntraMuscular once PRN Agitation  hydrOXYzine hydrochloride 25 milliGRAM(s) Oral every 6 hours PRN anxiety  LORazepam     Tablet 2 milliGRAM(s) Oral every 6 hours PRN Agitation  LORazepam   Injectable 2 milliGRAM(s) IntraMuscular Once PRN Agitation  
MEDICATIONS  (STANDING):  clonazePAM  Tablet 1 milliGRAM(s) Oral daily  DULoxetine 60 milliGRAM(s) Oral daily  gabapentin 200 milliGRAM(s) Oral two times a day  QUEtiapine 150 milliGRAM(s) Oral at bedtime    MEDICATIONS  (PRN):  acetaminophen     Tablet .. 650 milliGRAM(s) Oral every 6 hours PRN Mild Pain (1 - 3), Moderate Pain (4 - 6)  benzocaine/menthol Lozenge 1 Lozenge Oral every 3 hours PRN throat discomfort  Biotene Dry Mouth Oral Rinse 5 milliLiter(s) Swish and Spit two times a day PRN dry mouth  diphenhydrAMINE 50 milliGRAM(s) Oral every 6 hours PRN agitation  diphenhydrAMINE Injectable 50 milliGRAM(s) IntraMuscular once PRN agitation  haloperidol     Tablet 5 milliGRAM(s) Oral every 6 hours PRN agitation  haloperidol    Injectable 5 milliGRAM(s) IntraMuscular once PRN Agitation  hydrOXYzine hydrochloride 25 milliGRAM(s) Oral every 6 hours PRN anxiety  LORazepam     Tablet 2 milliGRAM(s) Oral every 6 hours PRN Agitation  LORazepam   Injectable 2 milliGRAM(s) IntraMuscular Once PRN Agitation  melatonin. 5 milliGRAM(s) Oral at bedtime PRN Insomnia  
MEDICATIONS  (STANDING):  clonazePAM  Tablet 1 milliGRAM(s) Oral daily  DULoxetine 40 milliGRAM(s) Oral daily  gabapentin 100 milliGRAM(s) Oral two times a day  QUEtiapine 150 milliGRAM(s) Oral at bedtime    MEDICATIONS  (PRN):  acetaminophen     Tablet .. 650 milliGRAM(s) Oral every 6 hours PRN Mild Pain (1 - 3), Moderate Pain (4 - 6)  acetaminophen     Tablet .. 650 milliGRAM(s) Oral once PRN Temp greater or equal to 38C (100.4F), Mild Pain (1 - 3), Moderate Pain (4 - 6)  benzocaine/menthol Lozenge 1 Lozenge Oral every 3 hours PRN throat discomfort  diphenhydrAMINE 50 milliGRAM(s) Oral every 6 hours PRN agitation  diphenhydrAMINE Injectable 50 milliGRAM(s) IntraMuscular once PRN agitation  haloperidol     Tablet 5 milliGRAM(s) Oral every 6 hours PRN agitation  haloperidol    Injectable 5 milliGRAM(s) IntraMuscular once PRN Agitation  hydrOXYzine hydrochloride 25 milliGRAM(s) Oral every 6 hours PRN anxiety  LORazepam     Tablet 2 milliGRAM(s) Oral every 6 hours PRN Agitation  LORazepam   Injectable 2 milliGRAM(s) IntraMuscular Once PRN Agitation  
MEDICATIONS  (STANDING):  clonazePAM  Tablet 1 milliGRAM(s) Oral daily  DULoxetine 20 milliGRAM(s) Oral daily  gabapentin 100 milliGRAM(s) Oral two times a day  QUEtiapine 150 milliGRAM(s) Oral at bedtime  venlafaxine XR 37.5 milliGRAM(s) Oral daily    MEDICATIONS  (PRN):  acetaminophen     Tablet .. 650 milliGRAM(s) Oral every 6 hours PRN Mild Pain (1 - 3), Moderate Pain (4 - 6)  acetaminophen     Tablet .. 650 milliGRAM(s) Oral once PRN Temp greater or equal to 38C (100.4F), Mild Pain (1 - 3), Moderate Pain (4 - 6)  benzocaine/menthol Lozenge 1 Lozenge Oral every 3 hours PRN throat discomfort  diphenhydrAMINE 50 milliGRAM(s) Oral every 6 hours PRN agitation  diphenhydrAMINE Injectable 50 milliGRAM(s) IntraMuscular once PRN agitation  haloperidol     Tablet 5 milliGRAM(s) Oral every 6 hours PRN agitation  haloperidol    Injectable 5 milliGRAM(s) IntraMuscular once PRN Agitation  hydrOXYzine hydrochloride 25 milliGRAM(s) Oral every 6 hours PRN anxiety  LORazepam     Tablet 2 milliGRAM(s) Oral every 6 hours PRN Agitation  LORazepam   Injectable 2 milliGRAM(s) IntraMuscular Once PRN Agitation  
MEDICATIONS  (STANDING):  clonazePAM  Tablet 1 milliGRAM(s) Oral daily  DULoxetine 60 milliGRAM(s) Oral daily  gabapentin 200 milliGRAM(s) Oral two times a day  QUEtiapine 150 milliGRAM(s) Oral at bedtime    MEDICATIONS  (PRN):  acetaminophen     Tablet .. 650 milliGRAM(s) Oral every 6 hours PRN Mild Pain (1 - 3), Moderate Pain (4 - 6)  benzocaine/menthol Lozenge 1 Lozenge Oral every 3 hours PRN throat discomfort  Biotene Dry Mouth Oral Rinse 5 milliLiter(s) Swish and Spit two times a day PRN dry mouth  diphenhydrAMINE 50 milliGRAM(s) Oral every 6 hours PRN agitation  diphenhydrAMINE Injectable 50 milliGRAM(s) IntraMuscular once PRN agitation  haloperidol     Tablet 5 milliGRAM(s) Oral every 6 hours PRN agitation  haloperidol    Injectable 5 milliGRAM(s) IntraMuscular once PRN Agitation  hydrOXYzine hydrochloride 25 milliGRAM(s) Oral every 6 hours PRN anxiety  LORazepam     Tablet 2 milliGRAM(s) Oral every 6 hours PRN Agitation  LORazepam   Injectable 2 milliGRAM(s) IntraMuscular Once PRN Agitation  melatonin. 5 milliGRAM(s) Oral at bedtime PRN Insomnia  
MEDICATIONS  (STANDING):  DULoxetine 60 milliGRAM(s) Oral daily  gabapentin 200 milliGRAM(s) Oral two times a day  QUEtiapine 100 milliGRAM(s) Oral at bedtime    MEDICATIONS  (PRN):  acetaminophen     Tablet .. 650 milliGRAM(s) Oral every 6 hours PRN Mild Pain (1 - 3), Moderate Pain (4 - 6)  benzocaine/menthol Lozenge 1 Lozenge Oral every 3 hours PRN throat discomfort  Biotene Dry Mouth Oral Rinse 5 milliLiter(s) Swish and Spit two times a day PRN dry mouth  diphenhydrAMINE 50 milliGRAM(s) Oral every 6 hours PRN agitation  diphenhydrAMINE Injectable 50 milliGRAM(s) IntraMuscular once PRN agitation  haloperidol     Tablet 5 milliGRAM(s) Oral every 6 hours PRN agitation  haloperidol    Injectable 5 milliGRAM(s) IntraMuscular once PRN Agitation  hydrOXYzine hydrochloride 25 milliGRAM(s) Oral every 6 hours PRN anxiety  LORazepam     Tablet 2 milliGRAM(s) Oral every 6 hours PRN Agitation  LORazepam   Injectable 2 milliGRAM(s) IntraMuscular Once PRN Agitation  melatonin. 5 milliGRAM(s) Oral at bedtime PRN Insomnia

## 2024-01-05 NOTE — BH INPATIENT PSYCHIATRY PROGRESS NOTE - NSTXDEPRESINTERMD_PSY_ALL_CORE
cross from effexor to cymbalta, support I/G/M therapy
trial of cymbalta, support I/G/M therapy
trial of cymbalta, support I/G/M therapy
cross from effexor to cymbalta, support I/G/M therapy
trial of cymbalta, support I/G/M therapy
trial of cymbalta, support I/G/M therapy
cross from effexor to cymbalta, support I/G/M therapy
trial of cymbalta, support I/G/M therapy
cross from effexor to cymbalta, support I/G/M therapy
trial of cymbalta, support I/G/M therapy
cross from effexor to cymbalta, support I/G/M therapy
trial of cymbalta, support I/G/M therapy
cross from effexor to cymbalta, support I/G/M therapy

## 2024-01-05 NOTE — BH INPATIENT PSYCHIATRY PROGRESS NOTE - NSBHCONSBHPROVDETAILS_PSY_A_CORE  FT
Collateral obtained from Dr. Sanders-see event note

## 2024-01-05 NOTE — BH INPATIENT PSYCHIATRY PROGRESS NOTE - MSE OPTIONS
Structured MSE
Unstructured MSE
Structured MSE

## 2024-01-05 NOTE — BH INPATIENT PSYCHIATRY PROGRESS NOTE - PRN MEDS
MEDICATIONS  (PRN):  acetaminophen     Tablet .. 650 milliGRAM(s) Oral every 6 hours PRN Mild Pain (1 - 3), Moderate Pain (4 - 6)  acetaminophen     Tablet .. 650 milliGRAM(s) Oral once PRN Temp greater or equal to 38C (100.4F), Mild Pain (1 - 3), Moderate Pain (4 - 6)  benzocaine/menthol Lozenge 1 Lozenge Oral every 3 hours PRN throat discomfort  diphenhydrAMINE 50 milliGRAM(s) Oral every 6 hours PRN agitation  diphenhydrAMINE Injectable 50 milliGRAM(s) IntraMuscular once PRN agitation  haloperidol     Tablet 5 milliGRAM(s) Oral every 6 hours PRN agitation  haloperidol    Injectable 5 milliGRAM(s) IntraMuscular once PRN Agitation  hydrOXYzine hydrochloride 25 milliGRAM(s) Oral every 6 hours PRN anxiety  LORazepam     Tablet 2 milliGRAM(s) Oral every 6 hours PRN Agitation  LORazepam   Injectable 2 milliGRAM(s) IntraMuscular Once PRN Agitation  
MEDICATIONS  (PRN):  acetaminophen     Tablet .. 650 milliGRAM(s) Oral every 6 hours PRN Mild Pain (1 - 3), Moderate Pain (4 - 6)  acetaminophen     Tablet .. 650 milliGRAM(s) Oral once PRN Temp greater or equal to 38C (100.4F), Mild Pain (1 - 3), Moderate Pain (4 - 6)  benzocaine/menthol Lozenge 1 Lozenge Oral every 3 hours PRN throat discomfort  diphenhydrAMINE 50 milliGRAM(s) Oral every 6 hours PRN agitation  diphenhydrAMINE Injectable 50 milliGRAM(s) IntraMuscular once PRN agitation  haloperidol     Tablet 5 milliGRAM(s) Oral every 6 hours PRN agitation  haloperidol    Injectable 5 milliGRAM(s) IntraMuscular once PRN Agitation  hydrOXYzine hydrochloride 25 milliGRAM(s) Oral every 6 hours PRN anxiety  LORazepam     Tablet 2 milliGRAM(s) Oral every 6 hours PRN Agitation  LORazepam   Injectable 2 milliGRAM(s) IntraMuscular Once PRN Agitation  
MEDICATIONS  (PRN):  acetaminophen     Tablet .. 650 milliGRAM(s) Oral every 6 hours PRN Mild Pain (1 - 3), Moderate Pain (4 - 6)  acetaminophen     Tablet .. 650 milliGRAM(s) Oral once PRN Temp greater or equal to 38C (100.4F), Mild Pain (1 - 3), Moderate Pain (4 - 6)  benzocaine/menthol Lozenge 1 Lozenge Oral every 3 hours PRN throat discomfort  diphenhydrAMINE 50 milliGRAM(s) Oral every 6 hours PRN agitation  diphenhydrAMINE Injectable 50 milliGRAM(s) IntraMuscular once PRN agitation  haloperidol     Tablet 5 milliGRAM(s) Oral every 6 hours PRN agitation  haloperidol    Injectable 5 milliGRAM(s) IntraMuscular once PRN Agitation  hydrOXYzine hydrochloride 25 milliGRAM(s) Oral every 6 hours PRN anxiety  LORazepam     Tablet 2 milliGRAM(s) Oral every 6 hours PRN Agitation  LORazepam   Injectable 2 milliGRAM(s) IntraMuscular Once PRN Agitation  melatonin. 5 milliGRAM(s) Oral at bedtime PRN Insomnia  
MEDICATIONS  (PRN):  acetaminophen     Tablet .. 650 milliGRAM(s) Oral every 6 hours PRN Mild Pain (1 - 3), Moderate Pain (4 - 6)  acetaminophen     Tablet .. 650 milliGRAM(s) Oral once PRN Temp greater or equal to 38C (100.4F), Mild Pain (1 - 3), Moderate Pain (4 - 6)  benzocaine/menthol Lozenge 1 Lozenge Oral every 3 hours PRN throat discomfort  diphenhydrAMINE 50 milliGRAM(s) Oral every 6 hours PRN agitation  diphenhydrAMINE Injectable 50 milliGRAM(s) IntraMuscular once PRN agitation  haloperidol     Tablet 5 milliGRAM(s) Oral every 6 hours PRN agitation  haloperidol    Injectable 5 milliGRAM(s) IntraMuscular once PRN Agitation  hydrOXYzine hydrochloride 25 milliGRAM(s) Oral every 6 hours PRN anxiety  LORazepam     Tablet 2 milliGRAM(s) Oral every 6 hours PRN Agitation  LORazepam   Injectable 2 milliGRAM(s) IntraMuscular Once PRN Agitation  
MEDICATIONS  (PRN):  acetaminophen     Tablet .. 650 milliGRAM(s) Oral every 6 hours PRN Mild Pain (1 - 3), Moderate Pain (4 - 6)  acetaminophen     Tablet .. 650 milliGRAM(s) Oral once PRN Temp greater or equal to 38C (100.4F), Mild Pain (1 - 3), Moderate Pain (4 - 6)  benzocaine/menthol Lozenge 1 Lozenge Oral every 3 hours PRN throat discomfort  diphenhydrAMINE 50 milliGRAM(s) Oral every 6 hours PRN agitation  diphenhydrAMINE Injectable 50 milliGRAM(s) IntraMuscular once PRN agitation  haloperidol     Tablet 5 milliGRAM(s) Oral every 6 hours PRN agitation  haloperidol    Injectable 5 milliGRAM(s) IntraMuscular once PRN Agitation  hydrOXYzine hydrochloride 25 milliGRAM(s) Oral every 6 hours PRN anxiety  LORazepam     Tablet 2 milliGRAM(s) Oral every 6 hours PRN Agitation  LORazepam   Injectable 2 milliGRAM(s) IntraMuscular Once PRN Agitation  
MEDICATIONS  (PRN):  acetaminophen     Tablet .. 650 milliGRAM(s) Oral every 6 hours PRN Mild Pain (1 - 3), Moderate Pain (4 - 6)  acetaminophen     Tablet .. 650 milliGRAM(s) Oral once PRN Temp greater or equal to 38C (100.4F), Mild Pain (1 - 3), Moderate Pain (4 - 6)  benzocaine/menthol Lozenge 1 Lozenge Oral every 3 hours PRN throat discomfort  diphenhydrAMINE 50 milliGRAM(s) Oral every 6 hours PRN agitation  diphenhydrAMINE Injectable 50 milliGRAM(s) IntraMuscular once PRN agitation  haloperidol     Tablet 5 milliGRAM(s) Oral every 6 hours PRN agitation  haloperidol    Injectable 5 milliGRAM(s) IntraMuscular once PRN Agitation  hydrOXYzine hydrochloride 25 milliGRAM(s) Oral every 6 hours PRN anxiety  LORazepam     Tablet 2 milliGRAM(s) Oral every 6 hours PRN Agitation  LORazepam   Injectable 2 milliGRAM(s) IntraMuscular Once PRN Agitation  melatonin. 5 milliGRAM(s) Oral at bedtime PRN Insomnia  
MEDICATIONS  (PRN):  acetaminophen     Tablet .. 650 milliGRAM(s) Oral every 6 hours PRN Mild Pain (1 - 3), Moderate Pain (4 - 6)  acetaminophen     Tablet .. 650 milliGRAM(s) Oral once PRN Temp greater or equal to 38C (100.4F), Mild Pain (1 - 3), Moderate Pain (4 - 6)  benzocaine/menthol Lozenge 1 Lozenge Oral every 3 hours PRN throat discomfort  diphenhydrAMINE 50 milliGRAM(s) Oral every 6 hours PRN agitation  diphenhydrAMINE Injectable 50 milliGRAM(s) IntraMuscular once PRN agitation  haloperidol     Tablet 5 milliGRAM(s) Oral every 6 hours PRN agitation  haloperidol    Injectable 5 milliGRAM(s) IntraMuscular once PRN Agitation  hydrOXYzine hydrochloride 25 milliGRAM(s) Oral every 6 hours PRN anxiety  LORazepam     Tablet 2 milliGRAM(s) Oral every 6 hours PRN Agitation  LORazepam   Injectable 2 milliGRAM(s) IntraMuscular Once PRN Agitation  
MEDICATIONS  (PRN):  acetaminophen     Tablet .. 650 milliGRAM(s) Oral every 6 hours PRN Mild Pain (1 - 3), Moderate Pain (4 - 6)  diphenhydrAMINE 50 milliGRAM(s) Oral every 6 hours PRN agitation  diphenhydrAMINE Injectable 50 milliGRAM(s) IntraMuscular once PRN agitation  haloperidol     Tablet 5 milliGRAM(s) Oral every 6 hours PRN agitation  haloperidol    Injectable 5 milliGRAM(s) IntraMuscular once PRN Agitation  hydrOXYzine hydrochloride 25 milliGRAM(s) Oral every 6 hours PRN anxiety  LORazepam     Tablet 2 milliGRAM(s) Oral every 6 hours PRN Agitation  LORazepam   Injectable 2 milliGRAM(s) IntraMuscular Once PRN Agitation  
MEDICATIONS  (PRN):  acetaminophen     Tablet .. 650 milliGRAM(s) Oral every 6 hours PRN Mild Pain (1 - 3), Moderate Pain (4 - 6)  benzocaine/menthol Lozenge 1 Lozenge Oral every 3 hours PRN throat discomfort  Biotene Dry Mouth Oral Rinse 5 milliLiter(s) Swish and Spit two times a day PRN dry mouth  diphenhydrAMINE 50 milliGRAM(s) Oral every 6 hours PRN agitation  diphenhydrAMINE Injectable 50 milliGRAM(s) IntraMuscular once PRN agitation  haloperidol     Tablet 5 milliGRAM(s) Oral every 6 hours PRN agitation  haloperidol    Injectable 5 milliGRAM(s) IntraMuscular once PRN Agitation  hydrOXYzine hydrochloride 25 milliGRAM(s) Oral every 6 hours PRN anxiety  LORazepam     Tablet 2 milliGRAM(s) Oral every 6 hours PRN Agitation  LORazepam   Injectable 2 milliGRAM(s) IntraMuscular Once PRN Agitation  melatonin. 5 milliGRAM(s) Oral at bedtime PRN Insomnia  
MEDICATIONS  (PRN):  acetaminophen     Tablet .. 650 milliGRAM(s) Oral every 6 hours PRN Mild Pain (1 - 3), Moderate Pain (4 - 6)  acetaminophen     Tablet .. 650 milliGRAM(s) Oral once PRN Temp greater or equal to 38C (100.4F), Mild Pain (1 - 3), Moderate Pain (4 - 6)  benzocaine/menthol Lozenge 1 Lozenge Oral every 3 hours PRN throat discomfort  diphenhydrAMINE 50 milliGRAM(s) Oral every 6 hours PRN agitation  diphenhydrAMINE Injectable 50 milliGRAM(s) IntraMuscular once PRN agitation  haloperidol     Tablet 5 milliGRAM(s) Oral every 6 hours PRN agitation  haloperidol    Injectable 5 milliGRAM(s) IntraMuscular once PRN Agitation  hydrOXYzine hydrochloride 25 milliGRAM(s) Oral every 6 hours PRN anxiety  LORazepam     Tablet 2 milliGRAM(s) Oral every 6 hours PRN Agitation  LORazepam   Injectable 2 milliGRAM(s) IntraMuscular Once PRN Agitation  
MEDICATIONS  (PRN):  acetaminophen     Tablet .. 650 milliGRAM(s) Oral every 6 hours PRN Mild Pain (1 - 3), Moderate Pain (4 - 6)  acetaminophen     Tablet .. 650 milliGRAM(s) Oral once PRN Temp greater or equal to 38C (100.4F), Mild Pain (1 - 3), Moderate Pain (4 - 6)  benzocaine/menthol Lozenge 1 Lozenge Oral every 3 hours PRN throat discomfort  diphenhydrAMINE 50 milliGRAM(s) Oral every 6 hours PRN agitation  diphenhydrAMINE Injectable 50 milliGRAM(s) IntraMuscular once PRN agitation  haloperidol     Tablet 5 milliGRAM(s) Oral every 6 hours PRN agitation  haloperidol    Injectable 5 milliGRAM(s) IntraMuscular once PRN Agitation  hydrOXYzine hydrochloride 25 milliGRAM(s) Oral every 6 hours PRN anxiety  LORazepam     Tablet 2 milliGRAM(s) Oral every 6 hours PRN Agitation  LORazepam   Injectable 2 milliGRAM(s) IntraMuscular Once PRN Agitation  melatonin. 5 milliGRAM(s) Oral at bedtime PRN Insomnia  
MEDICATIONS  (PRN):  acetaminophen     Tablet .. 650 milliGRAM(s) Oral every 6 hours PRN Mild Pain (1 - 3), Moderate Pain (4 - 6)  benzocaine/menthol Lozenge 1 Lozenge Oral every 3 hours PRN throat discomfort  Biotene Dry Mouth Oral Rinse 5 milliLiter(s) Swish and Spit two times a day PRN dry mouth  diphenhydrAMINE 50 milliGRAM(s) Oral every 6 hours PRN agitation  diphenhydrAMINE Injectable 50 milliGRAM(s) IntraMuscular once PRN agitation  haloperidol     Tablet 5 milliGRAM(s) Oral every 6 hours PRN agitation  haloperidol    Injectable 5 milliGRAM(s) IntraMuscular once PRN Agitation  hydrOXYzine hydrochloride 25 milliGRAM(s) Oral every 6 hours PRN anxiety  LORazepam     Tablet 2 milliGRAM(s) Oral every 6 hours PRN Agitation  LORazepam   Injectable 2 milliGRAM(s) IntraMuscular Once PRN Agitation  melatonin. 5 milliGRAM(s) Oral at bedtime PRN Insomnia  
MEDICATIONS  (PRN):  acetaminophen     Tablet .. 650 milliGRAM(s) Oral every 6 hours PRN Mild Pain (1 - 3), Moderate Pain (4 - 6)  acetaminophen     Tablet .. 650 milliGRAM(s) Oral once PRN Temp greater or equal to 38C (100.4F), Mild Pain (1 - 3), Moderate Pain (4 - 6)  benzocaine/menthol Lozenge 1 Lozenge Oral every 3 hours PRN throat discomfort  diphenhydrAMINE 50 milliGRAM(s) Oral every 6 hours PRN agitation  diphenhydrAMINE Injectable 50 milliGRAM(s) IntraMuscular once PRN agitation  haloperidol     Tablet 5 milliGRAM(s) Oral every 6 hours PRN agitation  haloperidol    Injectable 5 milliGRAM(s) IntraMuscular once PRN Agitation  hydrOXYzine hydrochloride 25 milliGRAM(s) Oral every 6 hours PRN anxiety  LORazepam     Tablet 2 milliGRAM(s) Oral every 6 hours PRN Agitation  LORazepam   Injectable 2 milliGRAM(s) IntraMuscular Once PRN Agitation  
MEDICATIONS  (PRN):  acetaminophen     Tablet .. 650 milliGRAM(s) Oral every 6 hours PRN Mild Pain (1 - 3), Moderate Pain (4 - 6)  benzocaine/menthol Lozenge 1 Lozenge Oral every 3 hours PRN throat discomfort  Biotene Dry Mouth Oral Rinse 5 milliLiter(s) Swish and Spit two times a day PRN dry mouth  diphenhydrAMINE 50 milliGRAM(s) Oral every 6 hours PRN agitation  diphenhydrAMINE Injectable 50 milliGRAM(s) IntraMuscular once PRN agitation  haloperidol     Tablet 5 milliGRAM(s) Oral every 6 hours PRN agitation  haloperidol    Injectable 5 milliGRAM(s) IntraMuscular once PRN Agitation  hydrOXYzine hydrochloride 25 milliGRAM(s) Oral every 6 hours PRN anxiety  LORazepam     Tablet 2 milliGRAM(s) Oral every 6 hours PRN Agitation  LORazepam   Injectable 2 milliGRAM(s) IntraMuscular Once PRN Agitation  melatonin. 5 milliGRAM(s) Oral at bedtime PRN Insomnia  

## 2024-01-05 NOTE — BH INPATIENT PSYCHIATRY PROGRESS NOTE - NSTXANXGOAL_PSY_ALL_CORE
Be able to perform ADLs and maintain safety despite anxiety/panic daily

## 2024-01-05 NOTE — BH INPATIENT PSYCHIATRY PROGRESS NOTE - NSTXDCFAMDATEEST_PSY_ALL_CORE
22-Dec-2023
15-Dec-2023
29-Dec-2023
15-Dec-2023
22-Dec-2023
29-Dec-2023
15-Dec-2023
29-Dec-2023
15-Dec-2023
05-Jan-2024
15-Dec-2023
15-Dec-2023
22-Dec-2023
22-Dec-2023
29-Dec-2023

## 2024-01-05 NOTE — BH INPATIENT PSYCHIATRY PROGRESS NOTE - NSBHCHARTREVIEWVS_PSY_A_CORE FT
Vital Signs Last 24 Hrs  T(C): 36.4 (12-29-23 @ 09:34), Max: 36.6 (12-28-23 @ 20:51)  T(F): 97.6 (12-29-23 @ 09:34), Max: 97.9 (12-28-23 @ 20:51)  HR: --  BP: --  BP(mean): --  RR: 16 (12-29-23 @ 09:34) (16 - 16)  SpO2: --    Orthostatic VS  12-29-23 @ 09:34  Lying BP: --/-- HR: --  Sitting BP: 96/83 HR: 99  Standing BP: 115/76 HR: 116  Site: --  Mode: --  Orthostatic VS  12-28-23 @ 06:29  Lying BP: --/-- HR: --  Sitting BP: 121/76 HR: 96  Standing BP: 119/66 HR: 111  Site: --  Mode: --  
Vital Signs Last 24 Hrs  T(C): 36.4 (12-15-23 @ 08:22), Max: 37.3 (12-14-23 @ 20:49)  T(F): 97.6 (12-15-23 @ 08:22), Max: 99.1 (12-14-23 @ 20:49)  HR: --  BP: --  BP(mean): --  RR: 18 (12-15-23 @ 08:22) (18 - 18)  SpO2: --    Orthostatic VS  12-15-23 @ 08:22  Lying BP: --/-- HR: --  Sitting BP: 125/72 HR: 96  Standing BP: 124/73 HR: 113  Site: --  Mode: --  
Vital Signs Last 24 Hrs  T(C): 36.2 (12-18-23 @ 08:14), Max: 37.1 (12-17-23 @ 20:48)  T(F): 97.1 (12-18-23 @ 08:14), Max: 98.7 (12-17-23 @ 20:48)  HR: --  BP: --  BP(mean): --  RR: 16 (12-18-23 @ 08:14) (16 - 16)  SpO2: --    Orthostatic VS  12-18-23 @ 08:14  Lying BP: --/-- HR: --  Sitting BP: 128/80 HR: 93  Standing BP: 129/65 HR: 115  Site: --  Mode: --  Orthostatic VS  12-18-23 @ 07:55  Lying BP: --/-- HR: --  Sitting BP: 128/80 HR: 93  Standing BP: 129/65 HR: 115  Site: --  Mode: --  Orthostatic VS  12-17-23 @ 08:25  Lying BP: --/-- HR: --  Sitting BP: 122/75 HR: 96  Standing BP: 124/66 HR: 113  Site: --  Mode: --  
Vital Signs Last 24 Hrs  T(C): 36.6 (12-17-23 @ 08:25), Max: 36.7 (12-16-23 @ 20:35)  T(F): 97.8 (12-17-23 @ 08:25), Max: 98 (12-16-23 @ 20:35)  HR: --  BP: --  BP(mean): --  RR: 16 (12-17-23 @ 08:25) (16 - 16)  SpO2: --    Orthostatic VS  12-17-23 @ 08:25  Lying BP: --/-- HR: --  Sitting BP: 122/75 HR: 96  Standing BP: 124/66 HR: 113  Site: --  Mode: --  Orthostatic VS  12-16-23 @ 06:29  Lying BP: --/-- HR: --  Sitting BP: --/-- HR: --  Standing BP: 124/79 HR: 93  Site: --  Mode: --  
Vital Signs Last 24 Hrs  T(C): 35.8 (12-27-23 @ 07:59), Max: 37.1 (12-26-23 @ 20:56)  T(F): 96.5 (12-27-23 @ 07:59), Max: 98.7 (12-26-23 @ 20:56)  HR: --  BP: --  BP(mean): --  RR: 14 (12-27-23 @ 07:59) (14 - 14)  SpO2: --    Orthostatic VS  12-27-23 @ 07:59  Lying BP: --/-- HR: --  Sitting BP: 121/76 HR: --  Standing BP: 117/71 HR: 99  Site: --  Mode: --  Orthostatic VS  12-26-23 @ 06:39  Lying BP: --/-- HR: --  Sitting BP: 114/72 HR: 98  Standing BP: 122/66 HR: 112  Site: --  Mode: --  
Vital Signs Last 24 Hrs  T(C): 36.5 (12-22-23 @ 06:46), Max: 36.7 (12-21-23 @ 22:02)  T(F): 97.7 (12-22-23 @ 06:46), Max: 98.1 (12-21-23 @ 22:02)  HR: --  BP: --  BP(mean): --  RR: 17 (12-22-23 @ 06:46) (17 - 17)  SpO2: --    Orthostatic VS  12-22-23 @ 06:46  Lying BP: --/-- HR: --  Sitting BP: 131/79 HR: 93  Standing BP: 116/73 HR: 109  Site: --  Mode: --  Orthostatic VS  12-21-23 @ 08:13  Lying BP: --/-- HR: --  Sitting BP: 123/83 HR: 98  Standing BP: 121/798 HR: 106  Site: --  Mode: --  
Vital Signs Last 24 Hrs  T(C): 36.1 (12-21-23 @ 08:13), Max: 37.1 (12-20-23 @ 20:59)  T(F): 97 (12-21-23 @ 08:13), Max: 98.7 (12-20-23 @ 20:59)  HR: --  BP: --  BP(mean): --  RR: 16 (12-21-23 @ 08:13) (16 - 16)  SpO2: --    Orthostatic VS  12-21-23 @ 08:13  Lying BP: --/-- HR: --  Sitting BP: 123/83 HR: 98  Standing BP: 121/798 HR: 106  Site: --  Mode: --  Orthostatic VS  12-20-23 @ 08:03  Lying BP: --/-- HR: --  Sitting BP: 120/89 HR: 93  Standing BP: 124/76 HR: 106  Site: --  Mode: --  
Vital Signs Last 24 Hrs  T(C): 36.6 (01-05-24 @ 08:06), Max: 36.8 (01-04-24 @ 20:31)  T(F): 97.8 (01-05-24 @ 08:06), Max: 98.3 (01-04-24 @ 20:31)  HR: --  BP: --  BP(mean): --  RR: 18 (01-05-24 @ 08:06) (18 - 18)  SpO2: --    Orthostatic VS  01-05-24 @ 08:06  Lying BP: --/-- HR: --  Sitting BP: 122/78 HR: 105  Standing BP: 121/73 HR: 112  Site: --  Mode: --  Orthostatic VS  01-04-24 @ 06:32  Lying BP: --/-- HR: --  Sitting BP: 116/73 HR: 81  Standing BP: 119/69 HR: 79  Site: --  Mode: --  
Vital Signs Last 24 Hrs  T(C): 36.7 (01-02-24 @ 08:37), Max: 36.7 (01-01-24 @ 20:23)  T(F): 98.1 (01-02-24 @ 08:37), Max: 98.1 (01-02-24 @ 08:37)  HR: --  BP: --  BP(mean): --  RR: 16 (01-02-24 @ 08:37) (16 - 16)  SpO2: --    Orthostatic VS  01-02-24 @ 08:37  Lying BP: --/-- HR: --  Sitting BP: 120/61 HR: 79  Standing BP: 125/66 HR: 81  Site: --  Mode: --  
Vital Signs Last 24 Hrs  T(C): 37.1 (12-26-23 @ 20:56), Max: 37.1 (12-26-23 @ 20:56)  T(F): 98.7 (12-26-23 @ 20:56), Max: 98.7 (12-26-23 @ 20:56)  HR: --  BP: --  BP(mean): --  RR: 17 (12-26-23 @ 06:39) (17 - 17)  SpO2: --    Orthostatic VS  12-26-23 @ 06:39  Lying BP: --/-- HR: --  Sitting BP: 114/72 HR: 98  Standing BP: 122/66 HR: 112  Site: --  Mode: --  Orthostatic VS  12-25-23 @ 08:15  Lying BP: --/-- HR: --  Sitting BP: 133/80 HR: 97  Standing BP: 118/76 HR: 104  Site: --  Mode: --  
Vital Signs Last 24 Hrs  T(C): 36.1 (12-28-23 @ 06:29), Max: 36.9 (12-27-23 @ 20:29)  T(F): 97 (12-28-23 @ 06:29), Max: 98.5 (12-27-23 @ 20:29)  HR: --  BP: --  BP(mean): --  RR: --  SpO2: --    Orthostatic VS  12-28-23 @ 06:29  Lying BP: --/-- HR: --  Sitting BP: 121/76 HR: 96  Standing BP: 119/66 HR: 111  Site: --  Mode: --  Orthostatic VS  12-27-23 @ 07:59  Lying BP: --/-- HR: --  Sitting BP: 121/76 HR: --  Standing BP: 117/71 HR: 99  Site: --  Mode: --  
Vital Signs Last 24 Hrs  T(C): 36.3 (12-20-23 @ 08:03), Max: 36.3 (12-20-23 @ 08:03)  T(F): 97.4 (12-20-23 @ 08:03), Max: 97.4 (12-20-23 @ 08:03)  HR: --  BP: --  BP(mean): --  RR: 16 (12-20-23 @ 08:03) (16 - 16)  SpO2: --    Orthostatic VS  12-20-23 @ 08:03  Lying BP: --/-- HR: --  Sitting BP: 120/89 HR: 93  Standing BP: 124/76 HR: 106  Site: --  Mode: --  Orthostatic VS  12-19-23 @ 06:40  Lying BP: --/-- HR: --  Sitting BP: 122/75 HR: 100  Standing BP: 127/67 HR: 100  Site: --  Mode: --  
Vital Signs Last 24 Hrs  T(C): 36.3 (12-19-23 @ 06:40), Max: 36.3 (12-19-23 @ 06:40)  T(F): 97.3 (12-19-23 @ 06:40), Max: 97.3 (12-19-23 @ 06:40)  HR: --  BP: --  BP(mean): --  RR: 20 (12-19-23 @ 06:40) (20 - 20)  SpO2: --    Orthostatic VS  12-19-23 @ 06:40  Lying BP: --/-- HR: --  Sitting BP: 122/75 HR: 100  Standing BP: 127/67 HR: 100  Site: --  Mode: --  Orthostatic VS  12-18-23 @ 08:14  Lying BP: --/-- HR: --  Sitting BP: 128/80 HR: 93  Standing BP: 129/65 HR: 115  Site: --  Mode: --  Orthostatic VS  12-18-23 @ 07:55  Lying BP: --/-- HR: --  Sitting BP: 128/80 HR: 93  Standing BP: 129/65 HR: 115  Site: --  Mode: --  
Vital Signs Last 24 Hrs  T(C): 36.2 (01-04-24 @ 06:32), Max: 36.7 (01-03-24 @ 21:06)  T(F): 97.2 (01-04-24 @ 06:32), Max: 98 (01-03-24 @ 21:06)  HR: --  BP: --  BP(mean): --  RR: 17 (01-04-24 @ 06:32) (17 - 17)  SpO2: --    Orthostatic VS  01-04-24 @ 06:32  Lying BP: --/-- HR: --  Sitting BP: 116/73 HR: 81  Standing BP: 119/69 HR: 79  Site: --  Mode: --  Orthostatic VS  01-03-24 @ 08:39  Lying BP: --/-- HR: --  Sitting BP: 124/79 HR: 103  Standing BP: 129/77 HR: 119  Site: --  Mode: --  
Vital Signs Last 24 Hrs  T(C): 36.6 (01-03-24 @ 08:39), Max: 36.7 (01-02-24 @ 21:19)  T(F): 97.9 (01-03-24 @ 08:39), Max: 98.1 (01-02-24 @ 21:19)  HR: --  BP: --  BP(mean): --  RR: 17 (01-03-24 @ 08:39) (17 - 17)  SpO2: --    Orthostatic VS  01-03-24 @ 08:39  Lying BP: --/-- HR: --  Sitting BP: 124/79 HR: 103  Standing BP: 129/77 HR: 119  Site: --  Mode: --  Orthostatic VS  01-02-24 @ 08:37  Lying BP: --/-- HR: --  Sitting BP: 120/61 HR: 79  Standing BP: 125/66 HR: 81  Site: --  Mode: --  
Vital Signs Last 24 Hrs  T(C): 36.9 (12-16-23 @ 06:29), Max: 37.1 (12-15-23 @ 20:55)  T(F): 98.4 (12-16-23 @ 06:29), Max: 98.8 (12-15-23 @ 20:55)  HR: 103 (12-16-23 @ 06:29) (103 - 103)  BP: 121/79 (12-16-23 @ 06:29) (121/79 - 121/79)  BP(mean): --  RR: 15 (12-16-23 @ 06:29) (15 - 15)  SpO2: --    Orthostatic VS  12-16-23 @ 06:29  Lying BP: --/-- HR: --  Sitting BP: --/-- HR: --  Standing BP: 124/79 HR: 93  Site: --  Mode: --  Orthostatic VS  12-15-23 @ 08:22  Lying BP: --/-- HR: --  Sitting BP: 125/72 HR: 96  Standing BP: 124/73 HR: 113  Site: --  Mode: --

## 2024-01-05 NOTE — BH INPATIENT PSYCHIATRY PROGRESS NOTE - MSE UNSTRUCTURED FT
The patient appears stated age, fair hygiene and dressed appropriately.  The patient was calm, cooperative with the interview and maintained appropriate eye contact and relatedness.  No psychomotor agitation or retardation noted, no abnormal movements.  Steady gait observed.  The patient's speech was fluent, normal in tone, rate, and volume.  The patient's mood is "good, steady."  Affect is neutral to anxious, reactive, more mood congruent. Thought process is goal directed. Thought content notable for improved hope, willingness, future oriented thinking, skill use. No delusional content. Deneis passive suicidal ideation, denies active SI plan/intent. Denies homicidal ideation, intent, or plan. Denies hallucinations.  Cognition AAOx3. Insight is limited to fair.  Judgment is limited to fair.  Impulse control has been fair on the unit.
Appearance: patient appears stated age, well-groomed and with good hygiene, dressed casually.   Behavior: Cooperative during interview, appropriate eye contact.   Speech: Normal rate and tone.    Motor: No PMA/R.   Gait: normal, ambulating without issue,   Mood: "not great"   Affect:  constricted, mood congruent.   Thought Process: Linear, goal oriented.   Thought Content: patient denies SI/ HI, denies urges to self-injure, ruminative anxiety about interpersonal issues.   Perceptual: No internal preoccupation, denies sensory disturbances.   Cognitive: attention/concentration intact.   Insight: fair and improving  Judgement: good   Impulse control: intact on unit.
Appearance: patient appears stated age, well-groomed and with good hygiene, dressed casually.   Behavior: Cooperative during interview, appropriate eye contact.   Speech: Normal rate and tone.    Motor: No PMA/R.   Gait: normal, ambulating without issue,   Mood: "ok"   Affect:  bright, mood congruent, reactive.    Thought Process: Linear, goal oriented.   Thought Content: patient denies SI/ HI, denies urges to self-injure, ruminative anxiety about interpersonal issues.   Perceptual: No internal preoccupation, denies sensory disturbances.   Cognitive: attention/concentration intact.   Insight: fair and improving  Judgement: good   Impulse control: intact on unit.
The patient appears stated age, fair hygiene and dressed appropriately.  The patient was calm, cooperative with the interview and maintained appropriate eye contact and relatedness.  No psychomotor agitation or retardation noted, no abnormal movements.  Steady gait observed.  The patient's speech was fluent, normal in tone, rate, and volume.  The patient's mood is "sad, miserable."  Affect is dysphoric to neutral, mood congruent. Thought process is goal directed. Thought content notable for continued depression, hopelessness, curiosity about skill use. No delusional content. Reports passive suicidal ideation, no active SI/plan/intent, Denies homicidal ideation, intent, or plan. Denies hallucinations.  Cognition AAOx3. Insight is limited.  Judgment is limited.  Impulse control has been fair on the unit.
The patient appears stated age, fair hygiene and dressed appropriately.  The patient was calm, cooperative with the interview and maintained appropriate eye contact and relatedness.  No psychomotor agitation or retardation noted, no abnormal movements.  Steady gait observed.  The patient's speech was fluent, normal in tone, rate, and volume.  The patient's mood is "very hopeless"  Affect is dysphoric, tearful, mood congruent. Thought process is goal directed. Thought content notable for continued depression, significant hopelessness and loneliness. No delusional content. Reports passive suicidal ideation, no active SI/plan/intent, Denies homicidal ideation, intent, or plan. Denies hallucinations.  Cognition AAOx3. Insight is limited.  Judgment is limited.  Impulse control has been fair on the unit.
The patient appears stated age, fair hygiene and dressed appropriately.  The patient was calm, cooperative with the interview and maintained appropriate eye contact and relatedness.  No psychomotor agitation or retardation noted, no abnormal movements.  Steady gait observed.  The patient's speech was fluent, normal in tone, rate, and volume.  The patient's mood is "not good"  Affect is sad to anxious, but reactive. Thought process is goal directed. Thought content notable for frustration/some doubt about recovery but responsive to encouragement, future oriented thinking, skill use. No delusional content. Denies passive suicidal ideation, denies active SI plan/intent. Denies homicidal ideation, intent, or plan. Denies hallucinations.  Cognition AAOx3. Insight is limited to fair.  Judgment is limited to fair.  Impulse control has been fair on the unit.
The patient appears stated age, fair hygiene and dressed appropriately.  The patient was calm, cooperative with the interview and maintained appropriate eye contact and relatedness.  No psychomotor agitation or retardation noted, no abnormal movements.  Steady gait observed.  The patient's speech was fluent, normal in tone, rate, and volume.  The patient's mood is "awful."  Affect is dysphoric, irritable, tearful, mood congruent. Thought process is goal directed. Thought content notable for continued depression, significant hopelessness and willfulness. No delusional content. Reports passive suicidal ideation, no active SI/plan/intent, Denies homicidal ideation, intent, or plan. Denies hallucinations.  Cognition AAOx3. Insight is limited.  Judgment is limited.  Impulse control has been fair on the unit.
The patient appears stated age, fair hygiene and dressed appropriately.  The patient was calm, cooperative with the interview and maintained fair eye contact and distant relatedness.  No psychomotor agitation or retardation noted, no abnormal movements.  Steady gait observed.  The patient's speech was fluent, normal in tone, rate, and volume.  The patient's mood is "anxious, frustrated"  Affect is dysphoric to anxious, with a moment of dysregulation and tearfulness, mood congruent. Thought process is goal directed. Thought content notable for anxiety, poor coping, hopelessness. No delusional content.  Denies passive suicidal ideation, no active SI/plan/intent, Denies homicidal ideation, intent, or plan. Denies hallucinations.  Cognition AAOx3. Insight is limited.  Judgment is limited.  Impulse control has been fair on the unit.
The patient appears stated age, fair hygiene and dressed appropriately.  The patient was calm, intermittently cooperative with the interview and maintained avoidant eye contact and relatedness.  No psychomotor agitation or retardation noted, no abnormal movements.  Steady gait observed.  The patient's speech was fluent, normal in tone, rate, and volume.  The patient's mood is "angry."  Affect is dysphoric, constricted, mood congruent. Thought process is goal directed. Thought content notable for continued depression, hopelessness, willfulness, not wanting to be a patient anymore. No delusional content. Reports passive suicidal ideation and  active SI but denies current plan/intent in the hospital. Denies homicidal ideation, intent, or plan. Denies hallucinations.  Cognition AAOx3. Insight is limited to poor.  Judgment is limited to poor.  Impulse control has been fair on the unit.
The patient appears stated age, fair hygiene and dressed appropriately.  The patient was calm, cooperative with the interview and maintained appropriate eye contact and relatedness.  No psychomotor agitation or retardation noted, no abnormal movements.  Steady gait observed.  The patient's speech was fluent, normal in tone, rate, and volume.  The patient's mood is "not good, not bad, more cheerful."  Affect is dysphoric, reactive, not mood congruent. Thought process is goal directed. Thought content notable for improved hope, willingness, future oriented thinking. No delusional content. Reports passive suicidal ideation but denies active SI plan/intent. Denies homicidal ideation, intent, or plan. Denies hallucinations.  Cognition AAOx3. Insight is limited.  Judgment is limited.  Impulse control has been fair on the unit.
The patient appears stated age, fair hygiene and dressed appropriately.  The patient was calm, cooperative with the interview and maintained appropriate eye contact and relatedness.  No psychomotor agitation or retardation noted, no abnormal movements.  Steady gait observed.  The patient's speech was fluent, normal in tone, rate, and volume.  The patient's mood is "sad, miserable."  Affect is dysphoric, constricted, mood congruent. Thought process is goal directed. Thought content notable for continued depression, hopelessness, wilfullness about skill use. No delusional content. Reports passive suicidal ideation, no active SI/plan/intent, Denies homicidal ideation, intent, or plan. Denies hallucinations.  Cognition AAOx3. Insight is limited.  Judgment is limited.  Impulse control has been fair on the unit.
The patient appears stated age, fair hygiene and dressed appropriately.  The patient was calm, cooperative with the interview and maintained appropriate eye contact and relatedness.  No psychomotor agitation or retardation noted, no abnormal movements.  Steady gait observed.  The patient's speech was fluent, normal in tone, rate, and volume.  The patient's mood is "mellow, chill."  Affect is dysphoric to neutral, mood congruent. Thought process is goal directed. Thought content notable for continued depression, hopelessness. No delusional content. Reports passive suicidal ideation, no active SI/plan/intent, Denies homicidal ideation, intent, or plan. Denies hallucinations.  Cognition AAOx3. Insight is limited.  Judgment is limited.  Impulse control has been fair on the unit.
The patient appears stated age, fair hygiene and dressed appropriately.  The patient was calm, cooperative with the interview and maintained avoidant eye contact and distant relatedness.  No psychomotor agitation or retardation noted, no abnormal movements.  Steady gait observed.  The patient's speech was fluent, normal in tone, rate, and volume.  The patient's mood is "not fine"  Affect is dysphoric to anxious, mood congruent. Thought process is goal directed. Thought content notable for willfulness, continued depression, hopelessness. No delusional content. Reports passive suicidal ideation, no active SI/plan/intent, Denies homicidal ideation, intent, or plan. Denies hallucinations.  Cognition AAOx3. Insight is limited.  Judgment is limited.  Impulse control has been fair on the unit.
Appearance: patient appears stated age, well-groomed and with good hygiene, dressed casually.   Behavior: Cooperative during interview, appropriate eye contact.   Speech: Normal rate and tone.    Motor: No PMA/R.   Gait: normal, ambulating without issue,   Mood: "ok"   Affect:  constricted, mood congruent.   Thought Process: Linear, goal oriented.   Thought Content: patient denies SI/ HI, denies urges to self-injure, ruminative anxiety about interpersonal issues.   Perceptual: No internal preoccupation, denies sensory disturbances.   Cognitive: attention/concentration intact.   Insight: fair and improving  Judgement: good   Impulse control: intact on unit.

## 2024-01-05 NOTE — BH INPATIENT PSYCHIATRY PROGRESS NOTE - NSTXCOPEDATEEST_PSY_ALL_CORE
15-Dec-2023

## 2024-01-05 NOTE — BH INPATIENT PSYCHIATRY PROGRESS NOTE - NSBHASSESSSUMMFT_PSY_ALL_CORE
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    Ddx MDD recurrent, GEORGE, Social Anxiety D/O, BPD    Appears brighter and more optimistic today, practicing skill use for anticipated dc Monday to [facility], tolerating meds. Had Individual therapy today which went well.     Given significant continued anxiety and depression with predominant dysregulation, hopelessness and anhedonia pt requires continued hospitalization for safety and stabilization.    Plan:  1.	Legal: continue 9.13 admission  2.	Safety: routine obs appropriate as pt denying active SI/HI; Haldol/Ativan/Benadryl PRN agitation  3.	Psychiatric: continue home meds for now pending collateral  -continue Cymbalta 60 mg daily  -decrease gabapentin to 200 mg BID for anxiety for now-given sedation  -continue Seroquel 150 mg HS -->decreasing to 100mg on 1/3 HS (due to AM sedation)  -Clonazepam 1 mg daily will decrease to 0.5mg on 1/3.   4.	I/G/M therapy with DBT  5.	Medical: no acute issues  6.	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    Ddx MDD recurrent, GEORGE, Social Anxiety D/O, BPD    Appears brighter and more optimistic today, practicing skill use for anticipated dc Monday to [facility], tolerating meds. Had Individual therapy today which went well.     Given significant continued anxiety and depression with predominant dysregulation, hopelessness and anhedonia pt requires continued hospitalization for safety and stabilization.    Plan:  1.	Legal: continue 9.13 admission  2.	Safety: routine obs appropriate as pt denying active SI/HI; Haldol/Ativan/Benadryl PRN agitation  3.	Psychiatric: continue home meds for now pending collateral  -continue Cymbalta 60 mg daily  -decrease gabapentin to 200 mg BID for anxiety for now-given sedation  -continue Seroquel 150 mg HS -->decreasing to 100mg on 1/3 HS (due to AM sedation)  -Clonazepam 1 mg daily will decrease to 0.5mg on 1/3.   4.	I/G/M therapy with DBT  5.	Medical: no acute issues  6.	Collateral/Dispo:   -obtained from Dad and psychiatrist  -dispo when stable-likely benefit from PHP then DBT referral

## 2024-01-05 NOTE — BH INPATIENT PSYCHIATRY PROGRESS NOTE - NSBHATTESTBILLING_PSY_A_CORE
53304-Mmycdzeyqz OBS or IP - moderate complexity OR 35-49 mins 73408-Iaszamdvsm OBS or IP - moderate complexity OR 35-49 mins

## 2024-01-05 NOTE — BH INPATIENT PSYCHIATRY PROGRESS NOTE - NSDCCRITERIA_PSY_ALL_CORE
improvement in depression, hope, and self care

## 2024-01-05 NOTE — BH INPATIENT PSYCHIATRY PROGRESS NOTE - NSTXCOPEDATETRGT_PSY_ALL_CORE
22-Dec-2023
02-Jan-2024
29-Dec-2023
22-Dec-2023
29-Dec-2023
22-Dec-2023
12-Jan-2024
22-Dec-2023
29-Dec-2023
22-Dec-2023
02-Jan-2024
22-Dec-2023
29-Dec-2023
02-Jan-2024

## 2024-01-05 NOTE — BH INPATIENT PSYCHIATRY PROGRESS NOTE - NSTXDEPRESGOAL_PSY_ALL_CORE
Report using a coping skill to overcome sadness and worry in order to socialize with peers daily

## 2024-01-05 NOTE — BH INPATIENT PSYCHIATRY PROGRESS NOTE - NSBHMETABOLICLABS_PSY_ALL_CORE
Labs within last 12 months
All labs not within last 12 months, ordered
All labs not within last 12 months, ordered
Labs within last 12 months

## 2024-01-05 NOTE — BH INPATIENT PSYCHIATRY PROGRESS NOTE - NSBHFUPINTERVALHXFT_PSY_A_CORE
Chart reviewed. Case d/w interdisciplinary team. No notable events last night, Pt slept well, denies feeling tired today as klonopin was dc, patient worrying in AM and frustrated about being on unit another weekend, but used skills and not meds to manage these emotions, and provided encouragement to patient re this. Planning for Monday dc, which patient is looking forward to, particularly seeing her dog alen. Denies SI, looking forward to engaging w php next week. Adherent with meds, denies side effects.

## 2024-01-05 NOTE — BH INPATIENT PSYCHIATRY PROGRESS NOTE - NSBHFUPMEDSE_PSY_A_CORE
None known
Yes
Yes
None known
Yes
None known
None known
Yes

## 2024-01-06 RX ADMIN — QUETIAPINE FUMARATE 100 MILLIGRAM(S): 200 TABLET, FILM COATED ORAL at 20:13

## 2024-01-06 RX ADMIN — DULOXETINE HYDROCHLORIDE 60 MILLIGRAM(S): 30 CAPSULE, DELAYED RELEASE ORAL at 10:01

## 2024-01-06 RX ADMIN — Medication 25 MILLIGRAM(S): at 20:12

## 2024-01-06 RX ADMIN — GABAPENTIN 200 MILLIGRAM(S): 400 CAPSULE ORAL at 20:12

## 2024-01-06 RX ADMIN — GABAPENTIN 200 MILLIGRAM(S): 400 CAPSULE ORAL at 10:01

## 2024-01-07 RX ORDER — HYDROXYZINE HCL 10 MG
1 TABLET ORAL
Qty: 28 | Refills: 0
Start: 2024-01-07 | End: 2024-01-20

## 2024-01-07 RX ORDER — GABAPENTIN 400 MG/1
2 CAPSULE ORAL
Qty: 56 | Refills: 0
Start: 2024-01-07 | End: 2024-01-20

## 2024-01-07 RX ORDER — DULOXETINE HYDROCHLORIDE 30 MG/1
1 CAPSULE, DELAYED RELEASE ORAL
Qty: 14 | Refills: 0
Start: 2024-01-07 | End: 2024-01-20

## 2024-01-07 RX ORDER — QUETIAPINE FUMARATE 200 MG/1
1 TABLET, FILM COATED ORAL
Qty: 14 | Refills: 0
Start: 2024-01-07 | End: 2024-01-20

## 2024-01-07 RX ADMIN — QUETIAPINE FUMARATE 100 MILLIGRAM(S): 200 TABLET, FILM COATED ORAL at 20:32

## 2024-01-07 RX ADMIN — GABAPENTIN 200 MILLIGRAM(S): 400 CAPSULE ORAL at 20:32

## 2024-01-07 RX ADMIN — GABAPENTIN 200 MILLIGRAM(S): 400 CAPSULE ORAL at 09:33

## 2024-01-07 RX ADMIN — Medication 25 MILLIGRAM(S): at 12:42

## 2024-01-07 RX ADMIN — DULOXETINE HYDROCHLORIDE 60 MILLIGRAM(S): 30 CAPSULE, DELAYED RELEASE ORAL at 09:33

## 2024-01-08 VITALS — RESPIRATION RATE: 18 BRPM | TEMPERATURE: 98 F

## 2024-01-08 PROCEDURE — 99239 HOSP IP/OBS DSCHRG MGMT >30: CPT

## 2024-01-08 RX ADMIN — GABAPENTIN 200 MILLIGRAM(S): 400 CAPSULE ORAL at 09:09

## 2024-01-08 RX ADMIN — DULOXETINE HYDROCHLORIDE 60 MILLIGRAM(S): 30 CAPSULE, DELAYED RELEASE ORAL at 09:10

## 2024-01-08 NOTE — BH PSYCHOLOGY - CLINICIAN PSYCHOTHERAPY NOTE - NSBHPSYCHOLPROBS_PSY_ALL_CORE
Depression/Suicidality

## 2024-01-08 NOTE — BH PSYCHOLOGY - CLINICIAN PSYCHOTHERAPY NOTE - NSBHPSYCHOLGOALS_PSY_A_CORE
Improve social/vocational/coping skills/Prevent relapse/Psychoeducation
Assessment/Improve social/vocational/coping skills/Psychoeducation
Improve social/vocational/coping skills/Prevent relapse/Psychoeducation
Assessment/Improve social/vocational/coping skills/Psychoeducation
Improve social/vocational/coping skills/Prevent relapse/Psychoeducation
Assessment/Improve social/vocational/coping skills/Psychoeducation
Improve social/vocational/coping skills/Prevent relapse/Psychoeducation

## 2024-01-08 NOTE — BH DISCHARGE NOTE NURSING/SOCIAL WORK/PSYCH REHAB - NSCDUDCCRISIS_PSY_A_CORE
[facility] Well  1 ([phone]) [facility]-WELL ([phone])  Text "WELL" to 34794  Website: www.[employer]/.  [facility] - DASH – Crisis Care for Children, Adults and Families  [address] [place] [address]  Mobile Crisis Hotline – (729) 141-9416/.National Suicide Prevention Lifeline 6 (202) 105-2134/.  [facility]  1 (242) LIFENET (966-0456)/.  [facility]  (829) 900-5859/.  [facility] Behavioral Health Helpline / [facility] Mobile Crisis  (487) 493-TPWP (0646)/.  [facility] Response Crisis Hotline  (963) 261-5761  24 hour telephone crisis intervention and suicide prevention hotline concerned with all mental health issues/.  [facility] Behavioral Health Crisis Center  [address] [place] [address] [phone]   Hours:  Monday through Friday from 9 AM to 3 PM/988 Suicide and Crisis Lifeline Critical access hospital Well  1 (687) [facility] access hospital-WELL ([phone])  Text "WELL" to 70931  Website: www.[employer]/.  [facility] - DASH – Crisis Care for Children, Adults and Families  [address] [place] [address]  Mobile Crisis Hotline – (197) 225-8202/.National Suicide Prevention Lifeline 5 (279) 739-0143/.  [facility]  1 (798) LIFENET (061-0507)/.  [facility]  (798) 583-8309/.  [facility] Behavioral Health Helpline / [facility] Mobile Crisis  (293) 483-UTMW (9084)/.  [facility] Response Crisis Hotline  (162) 916-8202  24 hour telephone crisis intervention and suicide prevention hotline concerned with all mental health issues/.  [facility] Behavioral Health Crisis Center  [address] [place] [address] [phone]   Hours:  Monday through Friday from 9 AM to 3 PM/988 Suicide and Crisis Lifeline

## 2024-01-08 NOTE — BH DISCHARGE NOTE NURSING/SOCIAL WORK/PSYCH REHAB - NSDCPRRECOMMEND_PSY_ALL_CORE
Pt would benefit from following up with [facility]- Partial Hospitalization Program for meditation management and psychotherapy- DBT especially. Pt would benefit from following up with [facility]- Partial Hospitalization Program for meditation management and psychotherapy- DBT especially.

## 2024-01-08 NOTE — BH INPATIENT PSYCHIATRY DISCHARGE NOTE - NSBHDCHANDOFFFT_PSY_ALL_CORE
Handoff including circumstance surrounding admission, past hx, hospital course, medications, and after care recs provided to PHP intake team, if questions regarding hospital course or patient come up following dc, please contact [facility] team at [facility] at [phone].  Handoff including circumstance surrounding admission, past hx, hospital course, medications, and after care recs provided to PHP intake team, if questions regarding hospital course or patient come up following dc, please contact 1 [facility] team at [facility] at [phone].

## 2024-01-08 NOTE — BH INPATIENT PSYCHIATRY DISCHARGE NOTE - MSE UNSTRUCTURED FT
Appearance: Patient appears stated age, good-grooming and hygiene, dressed casually.   Behavior: Cooperative during interview, appropriate eye contact.   Speech: Normal rate, productivity and tone.   Motor: No PMA/R.  Gait: normal, ambulating without issue  Mood: "excited"   Affect: bright, full reactive, mood congruent.   Thought Process: Linear, goal directed  Thought Content: patient denies SI/ HI, denies urges to self-injure.   Perceptual: Denies sensory disturbances.   Cognitive: aaox3, attention/concentration intact. Memory intact   Insight: good  Judgement: good  Impulse control: intact on unit.

## 2024-01-08 NOTE — BH INPATIENT PSYCHIATRY DISCHARGE NOTE - HOSPITAL COURSE
Dates of Hospitalization: 12/14/23-1/8/24    On admission interview on 1S patient presented with the following signs and symptoms:    Worsening anxiety including ruminative worry about the future and fear of what others think of her leading to decline in school functioing. Anxiety was associated with chest pain, racing thoughts, muscle tension. Increased mood lability, irritability, feelings of worthlessness, being a failure and a burden, sad mood, anhedonia, amotivation, hypersomnia, low energy, poor focus, hopelessness. Also reported increase in passive SI without any active SI due to Restorationist beliefs.    Randi also reported long standing mood lability, difficulties managing anger, black and white thinking with idealization/devaluation, identity diffusion, emptiness, chronic SI and stress related paranoia c/w BPD.     Patient was cross titrated from Effexor to Cymbalta, with Cymbalta increased to 60 mg daily. Gabapentin was added for anxiety while cymbalta was titrated. Gabapentin was increased to 300 mg BID although pt experienced some sedation so dose was decreased to 200 mg BID. She was continued on home seroquel 150 mg HS and clonazepam 1 mg daily, but over course of hospitalization Seroquel was reduced to 100mg HS and Clonazepam was tapered and ultimately dc prior to discharge as patient was reporting daytime sedation. Without Klonopin there was no observed rebound anxiety. Pt continued to sleep well despite lower dose of HS Seroquel.      Over the course of hospitalization the patient had good response to treatment, in the week prior to dc patient was no longer endorsing SI or urges to self injure, she became more hopeful and motivated to engaged. Anxiety became increasingly manageable to point that patient no longer wanted to take daytime Klonopin being bothered more by daytime sedation.     There were no behavioral problems on the unit.  Patient did not become agitated and did not require emergent intramuscular medications or seclusion/restraints.  Patient did not self-harm on the unit. Patient remained actively engaged in treatment. Patient participated in individual, group, and milieu therapy. Patient got along appropriately with staff and peers    At the time of dc the patient and family were engaged in safety planning and denied safety concerns related to dispo, they verbalized willingness to call 911 or go to ER in the event that patient was felt to be a threat to self or others.      Risk Assessment: The patient is at elevated chronic risk for suicide/self harm/ (not particularly for violence), but low acute risk for suicide/self harm/violence. Static risk factors include; psychiatric illness dx, hx of hospitalizations, anxiety, though patient has no hx of SA or SIB, no hx of violence or notable substance use. Acute risk factors present on admission but mitigated during hospitalization include; anxiety, passive SI, hopelessness, self-critical thinking. Acute risk factors were mitigated during admission via medication tx, I/G/M therapy, safety planning, and psychoeducation. Protective factors include; good response to tx, forward thinking regarding seeing her dog Senthil, engaging with PHP, and spending time with family, she is/has been engaged in tx, work/school. Given protective factors, and that acute risk factors present on admission have been addressed and are no longer present at dc, patient has returned to baseline level of acute risk and the patient no longer requires inpatient hospitalization for stabilization or safety. The patient is therefore appropriate for dc to PHP. Chronic risk can be further mitigated by continued engagement with outpatient tx, increasing community/social supports, DBT. At the time of dc the patient engaged meaningfully in safety planning and was dc home to outpatient tx.       There were no acute medical issues or events during the course of hospitalization.     Discharge Diagnosis: BPD, possibly MDD + anxiety, but with BPD likely more accurate representation of primary active issues.     Discharge Medications (the patient was provided with a 14 day supply of psychiatric medications upon dc):       DULoxetine 60 milliGRAM(s) Oral daily  gabapentin 200 milliGRAM(s) Oral two times a day  hydrOXYzine hydrochloride 25 milliGRAM(s) Oral every 6 hours PRN  QUEtiapine 100 milliGRAM(s) Oral at bedtime   Dates of Hospitalization: 12/14/23-1/8/24    On admission interview on 1S patient presented with the following signs and symptoms:    Worsening anxiety including ruminative worry about the future and fear of what others think of her leading to decline in school functioing. Anxiety was associated with chest pain, racing thoughts, muscle tension. Increased mood lability, irritability, feelings of worthlessness, being a failure and a burden, sad mood, anhedonia, amotivation, hypersomnia, low energy, poor focus, hopelessness. Also reported increase in passive SI without any active SI due to Baptism beliefs.    Randi also reported long standing mood lability, difficulties managing anger, black and white thinking with idealization/devaluation, identity diffusion, emptiness, chronic SI and stress related paranoia c/w BPD.     Patient was cross titrated from Effexor to Cymbalta, with Cymbalta increased to 60 mg daily. Gabapentin was added for anxiety while cymbalta was titrated. Gabapentin was increased to 300 mg BID although pt experienced some sedation so dose was decreased to 200 mg BID. She was continued on home seroquel 150 mg HS and clonazepam 1 mg daily, but over course of hospitalization Seroquel was reduced to 100mg HS and Clonazepam was tapered and ultimately dc prior to discharge as patient was reporting daytime sedation. Without Klonopin there was no observed rebound anxiety. Pt continued to sleep well despite lower dose of HS Seroquel.      Over the course of hospitalization the patient had good response to treatment, in the week prior to dc patient was no longer endorsing SI or urges to self injure, she became more hopeful and motivated to engaged. Anxiety became increasingly manageable to point that patient no longer wanted to take daytime Klonopin being bothered more by daytime sedation.     There were no behavioral problems on the unit.  Patient did not become agitated and did not require emergent intramuscular medications or seclusion/restraints.  Patient did not self-harm on the unit. Patient remained actively engaged in treatment. Patient participated in individual, group, and milieu therapy. Patient got along appropriately with staff and peers    At the time of dc the patient and family were engaged in safety planning and denied safety concerns related to dispo, they verbalized willingness to call 911 or go to ER in the event that patient was felt to be a threat to self or others.      Risk Assessment: The patient is at elevated chronic risk for suicide/self harm/ (not particularly for violence), but low acute risk for suicide/self harm/violence. Static risk factors include; psychiatric illness dx, hx of hospitalizations, anxiety, though patient has no hx of SA or SIB, no hx of violence or notable substance use. Acute risk factors present on admission but mitigated during hospitalization include; anxiety, passive SI, hopelessness, self-critical thinking. Acute risk factors were mitigated during admission via medication tx, I/G/M therapy, safety planning, and psychoeducation. Protective factors include; good response to tx, forward thinking regarding seeing her dog Senthil, engaging with PHP, and spending time with family, she is/has been engaged in tx, work/school. Given protective factors, and that acute risk factors present on admission have been addressed and are no longer present at dc, patient has returned to baseline level of acute risk and the patient no longer requires inpatient hospitalization for stabilization or safety. The patient is therefore appropriate for dc to PHP. Chronic risk can be further mitigated by continued engagement with outpatient tx, increasing community/social supports, DBT. At the time of dc the patient engaged meaningfully in safety planning and was dc home to outpatient tx.       There were no acute medical issues or events during the course of hospitalization.     Discharge Diagnosis: BPD, possibly MDD + anxiety, but with BPD likely more accurate representation of primary active issues.     Discharge Medications (the patient was provided with a 14 day supply of psychiatric medications upon dc):       DULoxetine 60 milliGRAM(s) Oral daily  gabapentin 200 milliGRAM(s) Oral two times a day  hydrOXYzine hydrochloride 25 milliGRAM(s) Oral every 6 hours PRN  QUEtiapine 100 milliGRAM(s) Oral at bedtime

## 2024-01-08 NOTE — BH INPATIENT PSYCHIATRY DISCHARGE NOTE - OTHER PAST PSYCHIATRIC HISTORY (INCLUDE DETAILS REGARDING ONSET, COURSE OF ILLNESS, INPATIENT/OUTPATIENT TREATMENT)
MDD, GEORGE, SAD, APD  1 prior hospitalization  in treatment at [facility] MDD, GEORGE, SAD, APD  1 prior hospitalization  in treatment at [facility]

## 2024-01-08 NOTE — BH INPATIENT PSYCHIATRY DISCHARGE NOTE - NSDCMRMEDTOKEN_GEN_ALL_CORE_FT
DULoxetine 60 mg oral delayed release capsule: 1 cap(s) orally once a day  gabapentin 100 mg oral capsule: 2 cap(s) orally 2 times a day  hydrOXYzine hydrochloride 25 mg oral tablet: 1 tab(s) orally 2 times a day as needed for anxiety  QUEtiapine 100 mg oral tablet: 1 tab(s) orally once a day (at bedtime)

## 2024-01-08 NOTE — BH PSYCHOLOGY - CLINICIAN PSYCHOTHERAPY NOTE - NSBHPSYCHOLPARTICIP_PSY_A_CORE
Partially engaged

## 2024-01-08 NOTE — BH PSYCHOLOGY - CLINICIAN PSYCHOTHERAPY NOTE - NSBHPSYCHOLINT_PSY_A_CORE
Cognitive/behavioral therapy/Dialectical  Behavioral Therapy (DBT)

## 2024-01-08 NOTE — BH INPATIENT PSYCHIATRY DISCHARGE NOTE - NSBHFUPINTERVALCCFT_PSY_A_CORE
Discharge Progress Note Date and Time: 01-08-24 @ 08:23  Patient seen on day of dc for follow up for depression/anxiety

## 2024-01-08 NOTE — BH DISCHARGE NOTE NURSING/SOCIAL WORK/PSYCH REHAB - [facility]
Unit Name: 1 South                       Unit Phone Number: [phone] Unit Name: 1 South                       Unit Phone Number: [phone]

## 2024-01-08 NOTE — BH PSYCHOLOGY - CLINICIAN PSYCHOTHERAPY NOTE - NSBHPSYCHOLNARRATIVE_PSY_A_CORE FT
Writer met with patient for individual therapy session at the recommendation of the treatment team. Pt was alert, oriented x4 and cooperative with session. Pt reported SI and represented as a 2/5. Pt reported that her SI was passive, and she denied any immediate intent or plan. Pt denied any HI. Pt denied any NSSI urges or actions. Pt reported mood as "fine" and affect was downbeat, congruent and full-range. No PMA or PMR is observed and speech was of normal rate, rhythm and volume. Pt's thought process was observed to be somewhat circumstantial, and her thought content was unremarkable. Pt was observed to look at the floor for most of the session, or into the distance. Impulse control was  intact and pt's insight and judgment are limited. Pt was observed to be casually dressed and presented w/ appropriate grooming and hygiene. Focus of session was on conducting behavior chain analysis on events leading to current hospitalization. Pt reported that her father found her at home, in bed, and "not getting up at all" d/t her depression. Pt reported that her father told her that if she "did not get out of bed," he would drive her to the hospital. Pt reported that she "didn't really care," and that is what led to her hospitalization. Pt also expressed significant self-loathing, and expressed that she would like to work on "building more self-esteem in therapy." Pt reported, in general, that she feels strained familial relationships and like she is "a burden to her family" and a "net negative on the world." Writer oriented pt to DBT Diary Card and she agreed to track urges, target behaviors and use of DBT skills. Writer also oriented pt to CAPS protocol and pt reaffirmed her commitment to safety on the unit. Validation was provided and solution analysis implemented.        
Writer met with patient for individual therapy session at the recommendation of the treatment team. Pt was alert, oriented x4 and cooperative with session. Pt  continued to report SI and represented as a 3/5. Consistent w/ previous sessions, pt reported that her SI was passive, and she denied any immediate intent or plan. Pt denied any HI. Pt denied any NSSI urges or actions. Pt reaffirmed her commitment to safety on the unit. Pt reported mood as "I'm not doing great" and affect was dysphoric and tearful, congruent and full-range. No PMA or PMR is observed and speech was of normal rate, rhythm and volume. Pt's thought process was observed to be linear, and her thought content was unremarkable. Pt maintained appropriate eye contact. Impulse control was intact and pt's insight and judgment are limited. Pt was observed to be casually dressed and presented w/ appropriate grooming and hygiene. Focus of session was on continuing to identify values w/ pt as well as reviewing Wise Mind, Checking the Facts, and other DBT skills pt could use to prevent her from "feeling so totally hopeless." Pt shared her journal w/ writer, and disclosed significant self-criticism, self-judgment, and feeling like "nothing will get better bc nothing ever has." Pt also shared that she feels like "god has punished [her] by putting [her] on this earth." Near the end of the session, pt was observed to become significantly dysregulated and tearful d/t a peer's nearby behavior on the unit. Pt and writer spent the remainder of the session doing breathing and grounding exercises together. Pt remained oriented to DBT Diary Card and agreed to continue to track urges, target behaviors and use of DBT skills. Validation was provided and solution analysis implemented.        
Writer met with patient for individual therapy session at the recommendation of the treatment team. Pt was alert, oriented x4 and cooperative with session. Pt denied SHIIP. Pt denied any NSSI urges or actions. Pt reaffirmed her commitment to safety on the unit. Pt reported mood as "ok" and affect was euthymic, congruent and full-range. No PMA or PMR was observed and speech was of normal rate, rhythm and volume. Pt's thought process was observed to be linear, and her thought content was unremarkable. Pt maintained appropriate eye contact. Impulse control was intact and pt's insight and judgment are fair. Pt was observed to be casually dressed and presented w/ appropriate grooming and hygiene.  Focus of session was on consolidating treatment gains, terminating treatment relationship effectively, and coping ahead for effective discharge. Pt engaged in discussion of skills she can use to manage transition home, including mindfulness and distress tolerance. Pt identified TIPP skills as especially useful for her, and reported a plan to start to find a job in line w/ her values. Pt reported that she was "ready to leave" and felt that therapy was useful for her. Validation was provided and solution analysis implemented.    
Writer met with patient for individual therapy session at the recommendation of the treatment team. Pt was alert, oriented x4 and cooperative with session. Pt  continued to report SI and represented as a 1/5. Consistent w/ previous sessions, pt reported that her SI was passive, and she denied any immediate intent or plan. Pt denied any HI. Pt denied any NSSI urges or actions. Pt reaffirmed her commitment to safety on the unit. Pt reported mood as "pretty good" and affect was euthymic, congruent and full-range. No PMA or PMR is observed and speech was of normal rate, rhythm and volume. Pt's thought process was observed to be linear, and her thought content was unremarkable. Pt maintained appropriate eye contact. Impulse control was intact and pt's insight and judgment are limited. Pt was observed to be casually dressed and presented w/ appropriate grooming and hygiene. Focus of session was on generating safety plan and coping ahead for effective discharge. With encouragement, pt identified warning signs, coping skills, and external supports that she can use to maintain safety and stability outside of the hospital after discharge. Discussion also centered on how pt can help keep the environment safe. Please see  Safety Plan for further detail. Pt committed to at least six months of safety outside the hospital. Focus of session was also on consolidating treatment gains, terminating treatment relationship effectively, and coping ahead for effective discharge. Pt engaged in discussion of skills she can use to manage transition home, including distress tolerance, especially Willingness and Turning the Mind. Pt and writer also discussed how pt can utilize Mindfulness skills such as Wise Mind and Nonjudgmentally observing. Pt remained oriented to DBT Diary Card and agreed to continue to track urges, target behaviors and use of DBT skills. Validation was provided and solution analysis implemented.        
Writer met with patient for individual therapy session at the recommendation of the treatment team. Pt was alert, oriented x4 and cooperative with session. Pt  continued to report SI and represented as a 2/5. Consistent w/ previous sessions, pt reported that her SI was passive, and she denied any immediate intent or plan. Pt denied any HI. Pt denied any NSSI urges or actions. Pt reaffirmed her commitment to safety on the unit. Pt reported mood as "just ok" and affect was downbeat and self-critical, congruent and full-range. No PMA or PMR is observed and speech was of normal rate, rhythm and volume. Pt's thought process was observed to be linear, and her thought content was unremarkable. Pt maintained appropriate eye contact. Impulse control was intact and pt's insight and judgment are limited. Pt was observed to be casually dressed and presented w/ appropriate grooming and hygiene. Focus of session was on discussing the DBT skill of Willingness, as well as working w/ pt on Values Identification. Pt reported that she "didn't have anything [she] value[s]" in her life, and so pt and writer discussed how she can begin to identify values that are important to her. Pt and writer also reviewed Willingness vs. Wilfulness, and how pt can more effectively respond to some of the self-loathing and self-critical thoughts she is prone to. Pt and writer also discussed the potential benefits of cultivating a non-judgmental stance towards herself and others. Pt remained oriented to DBT Diary Card and agreed to continue to track urges, target behaviors and use of DBT skills. Validation was provided and solution analysis implemented.        
Writer met with patient for individual therapy session at the recommendation of the treatment team. Pt was alert, oriented x4 and cooperative with session. Pt denied SHIIP. Pt denied any NSSI urges or actions. Pt reaffirmed her commitment to safety on the unit. Pt reported mood as "honestly, not doing too bad" and affect was euthymic, congruent and full-range. No PMA or PMR was observed and speech was of normal rate, rhythm and volume. Pt's thought process was observed to be linear, and her thought content was unremarkable. Pt maintained appropriate eye contact. Impulse control was intact and pt's insight and judgment are fair. Pt was observed to be casually dressed and presented w/ appropriate grooming and hygiene. Focus of session was on continuing to review DBT skills that pt could use to practice Emotion Regulation and Distress Tolerance, such as Opposite Action, Build Mastery, Radical Acceptance, and Awareness Exercises. Pt and writer also reviewed the past week on the unit, and pt disclosed some interpersonal difficulties w/ peers and staff that she wanted to discuss with writer. Overall, pt was observed to be less dysphoric and brighter than baseline, and she reported that she was feeling "a little bit of hope." Pt was also observed to be engaged in treatment, and remained oriented to DBT Diary Card and agreed to continue to track urges, target behaviors and use of DBT skills. Validation was provided and solution analysis implemented.        
Writer met with patient for individual therapy session at the recommendation of the treatment team. Pt was alert, oriented x4 and cooperative with session. Pt denied SHIIP. Pt denied any NSSI urges or actions. Pt reaffirmed her commitment to safety on the unit. Pt reported mood as "pretty sad" and affect was dysthymic, congruent and full-range. No PMA or PMR was observed and speech was of normal rate, rhythm and volume. Pt's thought process was observed to be linear, and her thought content was unremarkable. Pt maintained appropriate eye contact. Impulse control was intact and pt's insight and judgment are fair. Pt was observed to be casually dressed and presented w/ appropriate grooming and hygiene. Focus of session was on discussing pt's frustration w/ remaining on the unit over the weekend, and working on her she can effectively cope w/ feeling "stuck here again." Pt reported that she was "feeling down bc [she] really wanted to go today." Writer validated that feeling, while prompting pt to work on accepting that she would be here over the weekend, and trying to make the most efficacious use of her time here. Pt initially reported that "all [she] could do was just stay in her room," but w/ encouragement, was able to identify activities like reading a book, paced breathing, and journaling that might be more effective ways of coping w/ her frustration. Pt and writer also discussed how pt could effectively cope ahead for her eventual discharge next week. Pt was observed to be resistant to identifying specific cope ahead skills, but reported that she would "do it this weekend."   Pt was also observed to be engaged in treatment, and remained oriented to DBT Diary Card and agreed to continue to track urges, target behaviors and use of DBT skills. Validation was provided and solution analysis implemented.

## 2024-01-08 NOTE — BH DISCHARGE NOTE NURSING/SOCIAL WORK/PSYCH REHAB - NSBHDCADDR1FT_A_CORE
[facility] : [phone] [place] [address] 2nd Floor   [facility] : [phone] [place] [address] 2nd Floor

## 2024-01-08 NOTE — BH INPATIENT PSYCHIATRY DISCHARGE NOTE - DESCRIPTION
single, college student at [employer], lives w/ parents and younger sister in [place]. Dad is primary support. single, college student at [employer], lives w/ parents and younger sister in [place]. Dad is primary support.

## 2024-01-08 NOTE — BH DISCHARGE NOTE NURSING/SOCIAL WORK/PSYCH REHAB - PATIENT PORTAL LINK FT
You can access the FollowMyHealth Patient Portal offered by [facility] by registering at the following website: [email or web address]. By joining [employer]’s FollowMyHealth portal, you will also be able to view your health information using other applications (apps) compatible with our system. You can access the FollowMyHealth Patient Portal offered by [facility] by registering at the following website: [email or web address]. By joining [employer]’s FollowMyHealth portal, you will also be able to view your health information using other applications (apps) compatible with our system.

## 2024-01-08 NOTE — BH PSYCHOLOGY - CLINICIAN PSYCHOTHERAPY NOTE - NSBHPTASSESSDT_PSY_A_CORE
22-Dec-2023 08:20
15-Dec-2023 08:50
18-Dec-2023 10:30
08-Jan-2024 08:50
19-Dec-2023 09:15
02-Jan-2024 08:45
05-Jan-2024 10:45

## 2024-01-08 NOTE — BH INPATIENT PSYCHIATRY DISCHARGE NOTE - NSBHASSESSSUMMFT_PSY_ALL_CORE
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    Ddx MDD recurrent, GEORGE, Social Anxiety D/O, BPD    Patient doing well, is looking forward to next steps in treatment and engaging with [facility]. Plan for dc home today to [facility] tomorrow.     Plan:  1.	Legal: continue 9.13 admission  2.	Safety: routine obs appropriate as pt denying active SI/HI; Haldol/Ativan/Benadryl PRN agitation  3.	Psychiatric: continue home meds for now pending collateral  -continue Cymbalta 60 mg daily  -decrease gabapentin to 200 mg BID for anxiety for now-given sedation  -continue Seroquel 150 mg HS -->decreasing to 100mg on 1/3 HS (due to AM sedation)  -Clonazepam 1 mg daily will decrease to 0.5mg on 1/3. DC'd prior to discharge.   4.	I/G/M therapy with DBT  5.	Medical: no acute issues  6.	Collateral/Dispo:   -obtained from Dad and psychiatrist  -dispo: dc home to [facility] today.    23F, college student at [employer] (recent withdrawal), single, domiciled with parents and sister with a PPHX of MDD, GEORGE, social anxiety disorder, currently in treatment at [facility], 1 psych hosp (11/7-11/15/19, for depression and poor affect regulation), no history of SA or NSSIB, no history of violence/aggression/legal issues, no history of substance use, PMH sig for PCOS, who presents to ED BIB by father and at recommendation of psychiatrist for low mood and passive thoughts of death in setting of school-related stressors.    Ddx MDD recurrent, GEORGE, Social Anxiety D/O, BPD    Patient doing well, is looking forward to next steps in treatment and engaging with [facility]. Plan for dc home today to [facility] tomorrow.     Plan:  1.	Legal: continue 9.13 admission  2.	Safety: routine obs appropriate as pt denying active SI/HI; Haldol/Ativan/Benadryl PRN agitation  3.	Psychiatric: continue home meds for now pending collateral  -continue Cymbalta 60 mg daily  -decrease gabapentin to 200 mg BID for anxiety for now-given sedation  -continue Seroquel 150 mg HS -->decreasing to 100mg on 1/3 HS (due to AM sedation)  -Clonazepam 1 mg daily will decrease to 0.5mg on 1/3. DC'd prior to discharge.   4.	I/G/M therapy with DBT  5.	Medical: no acute issues  6.	Collateral/Dispo:   -obtained from Dad and psychiatrist  -dispo: dc home to [facility] today.

## 2024-01-08 NOTE — BH INPATIENT PSYCHIATRY DISCHARGE NOTE - NSBHDCBILLING_PSY_ALL_CORE
69240 (Hospital discharge day management; more than 30 min) [id number] (Hospital discharge day management; more than 30 min)

## 2024-01-08 NOTE — BH INPATIENT PSYCHIATRY DISCHARGE NOTE - NSBHMETABOLIC_PSY_ALL_CORE_FT
BMI:   HbA1c: A1C with Estimated Average Glucose Result: 5.4 % (12-16-23 @ 14:46)    Glucose:   BP: --Vital Signs Last 24 Hrs  T(C): 36.5 (01-08-24 @ 07:57), Max: 37.1 (01-07-24 @ 21:30)  T(F): 97.7 (01-08-24 @ 07:57), Max: 98.7 (01-07-24 @ 21:30)  HR: --  BP: --  BP(mean): --  RR: 18 (01-08-24 @ 07:57) (18 - 18)  SpO2: --    Orthostatic VS  01-08-24 @ 07:57  Lying BP: --/-- HR: --  Sitting BP: 122/77 HR: 98  Standing BP: 123/72 HR: 111  Site: --  Mode: --  Orthostatic VS  01-07-24 @ 08:05  Lying BP: --/-- HR: --  Sitting BP: 120/72 HR: 99  Standing BP: 120/74 HR: 103  Site: --  Mode: --    Lipid Panel: Date/Time: 12-16-23 @ 12:48  Cholesterol, Serum: 175  LDL Cholesterol Calculated: 115  HDL Cholesterol, Serum: 44  Total Cholesterol/HDL Ration Measurement: --  Triglycerides, Serum: 81

## 2024-01-08 NOTE — BH INPATIENT PSYCHIATRY DISCHARGE NOTE - HPI (INCLUDE ILLNESS QUALITY, SEVERITY, DURATION, TIMING, CONTEXT, MODIFYING FACTORS, ASSOCIATED SIGNS AND SYMPTOMS)
23F, college student at [employer] (recent withdrawal), single, domiciled with parents and sister with a PPHX of MDD, GEORGE, social anxiety disorder, currently in treatment at [facility], 1 psych hosp (11/7-11/15/19, for depression and poor affect regulation), no history of SA or NSSIB, no history of violence/aggression/legal issues, no history of substance use, PMH sig for PCOS, who presents to ED BIB by father and at recommendation of psychiatrist for low mood and passive thoughts of death in setting of school-related stressors.     Patient is slow to warm up on interview on 1S, expressing feeling like there is nothing anyone can do to help her. She reports long standing struggles with depression and anxiety since middle school with social anxiety leading to social isolation, irritability and conflict with family. Over the past few weeks she reports having worsening anxiety causing difficulty completing school assignments on time, increased mood lability and irritability and increased feelings that she is a failure and burden on her family. Reports feeling sad, amotivated, with hypersomnia and low energy. Also reports anhedonia, intermittent poor focus (often stuck on negative thoughts that distract her), hopelessness, guilt, and worthlessness. Denies appetite changes. Has been feeling increasingly passively suicidal for the past month, with belief that family would be better if she was dead. Denies any active SI, reporting the only reason she does not allow her thoughts to progress to active or planning is that she believes she would go to hell if she completed suicide. Also experiencing anxiety a/w chest pain, racing thoughts, worry in many areas (finances, dog, school, perception by others, future) and muscle tension. In the period leading to admission she ended up withdrawing from 3 classes as she felt she would not be able to complete the assignments and as a result fail the class. Had plans to graduate in May 2024 and feels this will no longer happen-deeming her a failure. These feelings led to increased isolation and time in bed-prompting concern from psychiatrist and father and leading to ED presentation and hospitalization.     On ROS denies AVH. At times thinks professors are trying to take advantage of their power. She reads into people's expressions to find evidence to support her belief that they do not like her. Denies ideas of reference, TW/TI/TB, roberto delusions, VI/HI. No s/s eric. Reports compliance with medication regimen but is unsure whether it is helping her at all.    Per ED  Assessment Note documented 12/13/23:  "Calm and cooperative with interview. Neutral affect, linear.   Pt reports months long hx of low mood with worsening over past three days. Trigger unknown but of note pt withdrew from all her college courses 1 week ago in setting of "I wasn't doing my work and scared of failing". Over the past 3 days, reports hypersomnia, not wanting to get out of bed, decreased attn to ADLs (not showering or brushing teeth), anhedonia, hopelessness and worthlessness. No changes in appetite. Pt endorses passive SI, "my parents and sister will be better off without me", but denies intent or plan. No preparatory acts. States passive SI is chornic. No hx of SA. No NSSIB. Patient is interested in voluntary admission. Pt with one previous admission in 2019 for similar presentation. Denies psychotic symptoms, including paranoia, AH, IOR, MR, TI, TW, TB. No manic symptoms, including decreased need for sleep, persistently elevated and/or irritable mood, and increase in goal-directed behavior. No substance use. Currently in treatment with Dr. Sanders at [facility]. Adherent with treatment. Adherent with medications: effexor 75 mg, seroquel 150mg, klonopin 1 mg prn     Collateral information obtained from patient's father Mo Bartlett, see chart note.    Collateral obtained from Dr. Sanders- pt has been struggling with persistent mood symptoms and passive SI in context of school and family stressors. At most recent appointment, pt stated that they felt "they would lose control" and hospitalization was "coming". Dr. Sanders concerned for pt's ability to be managed at current level of care and is advocating for higher level of care. Pt medications are currently being titrated, last change was on 12/11. Has been considering switching medication for depression but has had difficulty with insurance. Pt has chronic SI but is able to safety plan. Last safety plan completed 11/13/23." 23F, college student at [employer] (recent withdrawal), single, domiciled with parents and sister with a PPHX of MDD, GEORGE, social anxiety disorder, currently in treatment at [facility], 1 psych hosp (11/7-11/15/19, for depression and poor affect regulation), no history of SA or NSSIB, no history of violence/aggression/legal issues, no history of substance use, PMH sig for PCOS, who presents to ED BIB by father and at recommendation of psychiatrist for low mood and passive thoughts of death in setting of school-related stressors.     Patient is slow to warm up on interview on 1S, expressing feeling like there is nothing anyone can do to help her. She reports long standing struggles with depression and anxiety since middle school with social anxiety leading to social isolation, irritability and conflict with family. Over the past few weeks she reports having worsening anxiety causing difficulty completing school assignments on time, increased mood lability and irritability and increased feelings that she is a failure and burden on her family. Reports feeling sad, amotivated, with hypersomnia and low energy. Also reports anhedonia, intermittent poor focus (often stuck on negative thoughts that distract her), hopelessness, guilt, and worthlessness. Denies appetite changes. Has been feeling increasingly passively suicidal for the past month, with belief that family would be better if she was dead. Denies any active SI, reporting the only reason she does not allow her thoughts to progress to active or planning is that she believes she would go to hell if she completed suicide. Also experiencing anxiety a/w chest pain, racing thoughts, worry in many areas (finances, dog, school, perception by others, future) and muscle tension. In the period leading to admission she ended up withdrawing from 3 classes as she felt she would not be able to complete the assignments and as a result fail the class. Had plans to graduate in May 2024 and feels this will no longer happen-deeming her a failure. These feelings led to increased isolation and time in bed-prompting concern from psychiatrist and father and leading to ED presentation and hospitalization.     On ROS denies AVH. At times thinks professors are trying to take advantage of their power. She reads into people's expressions to find evidence to support her belief that they do not like her. Denies ideas of reference, TW/TI/TB, roberto delusions, VI/HI. No s/s eric. Reports compliance with medication regimen but is unsure whether it is helping her at all.    Per ED  Assessment Note documented 12/13/23:  "Calm and cooperative with interview. Neutral affect, linear.   Pt reports months long hx of low mood with worsening over past three days. Trigger unknown but of note pt withdrew from all her college courses 1 week ago in setting of "I wasn't doing my work and scared of failing". Over the past 3 days, reports hypersomnia, not wanting to get out of bed, decreased attn to ADLs (not showering or brushing teeth), anhedonia, hopelessness and worthlessness. No changes in appetite. Pt endorses passive SI, "my parents and sister will be better off without me", but denies intent or plan. No preparatory acts. States passive SI is chornic. No hx of SA. No NSSIB. Patient is interested in voluntary admission. Pt with one previous admission in 2019 for similar presentation. Denies psychotic symptoms, including paranoia, AH, IOR, MR, TI, TW, TB. No manic symptoms, including decreased need for sleep, persistently elevated and/or irritable mood, and increase in goal-directed behavior. No substance use. Currently in treatment with Dr. Sanders at [facility]. Adherent with treatment. Adherent with medications: effexor 75 mg, seroquel 150mg, klonopin 1 mg prn     Collateral information obtained from patient's father Mo Bartlett, see chart note.    Collateral obtained from Dr. Sanders- pt has been struggling with persistent mood symptoms and passive SI in context of school and family stressors. At most recent appointment, pt stated that they felt "they would lose control" and hospitalization was "coming". Dr. Sanders concerned for pt's ability to be managed at current level of care and is advocating for higher level of care. Pt medications are currently being titrated, last change was on 12/11. Has been considering switching medication for depression but has had difficulty with insurance. Pt has chronic SI but is able to safety plan. Last safety plan completed 11/13/23."

## 2024-01-08 NOTE — BH DISCHARGE NOTE NURSING/SOCIAL WORK/PSYCH REHAB - NSDCPRGOAL_PSY_ALL_CORE
During the current hospitalization, patient has been addressing psychiatric rehabilitation goals pertaining to identifying coping skills in decreasing SI, feelings of emotional dysregulation and intensity, depressive symptoms, and anxiety. Patient has demonstrated progress towards psychiatric rehabilitation goals during the current hospitalization. Patient exhibited progress through participating in individual and group therapy and developing additional coping skills to assist with improving mood and decreasing symptoms. Pt was able to identify warning signs to prevent relapse. Pt has been managing symptoms with positive distraction, radical acceptance, and distress regulation skills. Pt utilized coping skills such as music, dancing, dialectics, restructuring cognitive distortions and false core beliefs, TIPP, opposite action, coping ahead, reading, affirmations,  reframing, self compassion, journaling, and checking the facts, psychotherapy and problem solving, self affirmation, self soothing, and positive distraction.  Patient was compliant with medication during current hospitalization. Pt is future oriented and looks forward to spending to returning to work and school. Denied SI/HI.

## 2024-01-08 NOTE — BH DISCHARGE NOTE NURSING/SOCIAL WORK/PSYCH REHAB - NSBHDCAGENCY1FT_PSY_A_CORE
[facility]- Partial Hospitalization Program  [facility]- Partial Hospitalization Program

## 2024-01-08 NOTE — BH DISCHARGE NOTE NURSING/SOCIAL WORK/PSYCH REHAB - DISCHARGE INSTRUCTIONS AFTERCARE APPOINTMENTS
In order to check the location, date, or time of your aftercare appointment, please refer to your Discharge Instructions Document given to you upon leaving the hospital.  If you have lost the instructions please call [phone] In order to check the location, date, or time of your aftercare appointment, please refer to your Discharge Instructions Document given to you upon leaving the hospital.  If you have lost the instructions please call [phone]

## 2024-01-08 NOTE — BH INPATIENT PSYCHIATRY DISCHARGE NOTE - NSBHFUPINTERVALHXFT_PSY_A_CORE
Chart reviewed, case discussed in team.  Chart reviewed, case discussed in team. No acute events over night or over the weekend. Patient adherent with meds and tolerating well. She had a "slow weekend" and was quite bored but is excited to be returning home, particularly to see her dog Senthil. She has been consistently denying SI or urges to self injure. Anxiety is considerably improved. Pt has been engaged in group and individual therapy. She took well to safety planning and will be dc home today.

## 2024-01-10 NOTE — SOCIAL WORK POST DISCHARGE FOLLOW UP NOTE - NSBHSWFOLLOWUP_PSY_ALL_CORE_FT
[facility] APPT:   NEW PHP is far out so you need to check in with a Doctor  Thursday, 1/11, with arrival by 12:45   [facility], [facility] 1st  Floor  [address] [place] [id number] ( [address])    NEW PHP START:  Thursday, 1/18, with arrival by 10:30am to register  [facility], [facility] 2nd Floor  [phone] [place] [id number]   [facility] APPT:   NEW PHP is far out so you need to check in with a Doctor  Thursday, 1/11, with arrival by 12:45   [facility], [facility] 1st  Floor  [address] [place] [id number] ( [address])    NEW PHP START:  Thursday, 1/18, with arrival by 10:30am to register  [facility], [facility] 2nd Floor  [address] [place] [id number]

## 2024-01-18 ENCOUNTER — OUTPATIENT (OUTPATIENT)
Dept: OUTPATIENT SERVICES | Facility: HOSPITAL | Age: 24
LOS: 1 days | Discharge: HOME | End: 2024-01-18
Payer: COMMERCIAL

## 2024-01-18 PROCEDURE — 90792 PSYCH DIAG EVAL W/MED SRVCS: CPT

## 2024-01-19 DIAGNOSIS — F40.10 SOCIAL PHOBIA, UNSPECIFIED: ICD-10-CM

## 2024-01-19 DIAGNOSIS — F33.9 MAJOR DEPRESSIVE DISORDER, RECURRENT, UNSPECIFIED: ICD-10-CM

## 2024-01-29 PROCEDURE — 99213 OFFICE O/P EST LOW 20 MIN: CPT

## 2024-01-30 NOTE — ED BEHAVIORAL HEALTH ASSESSMENT NOTE - NS ED B[facility] OTHER STREET DRUGS MEDICATION
Called pt to schedule appointment with Dr. Domínguez. Call was not answered, LVM.    Candy Jackson RN  [facility]    
None known

## 2024-02-05 PROCEDURE — 99213 OFFICE O/P EST LOW 20 MIN: CPT

## 2024-02-15 PROCEDURE — 99213 OFFICE O/P EST LOW 20 MIN: CPT

## 2024-02-23 PROCEDURE — 99213 OFFICE O/P EST LOW 20 MIN: CPT

## 2024-03-06 PROCEDURE — 90791 PSYCH DIAGNOSTIC EVALUATION: CPT

## 2024-03-17 ENCOUNTER — EMERGENCY (EMERGENCY)
Facility: HOSPITAL | Age: 24
LOS: 1 days | Discharge: HOME | End: 2024-03-17
Admitting: STUDENT IN AN ORGANIZED HEALTH CARE EDUCATION/TRAINING PROGRAM
Payer: COMMERCIAL

## 2024-03-17 VITALS
TEMPERATURE: 98 F | OXYGEN SATURATION: 98 % | HEART RATE: 96 BPM | SYSTOLIC BLOOD PRESSURE: 124 MMHG | RESPIRATION RATE: 16 BRPM | DIASTOLIC BLOOD PRESSURE: 88 MMHG

## 2024-03-17 VITALS
TEMPERATURE: 98 F | RESPIRATION RATE: 18 BRPM | HEART RATE: 95 BPM | DIASTOLIC BLOOD PRESSURE: 86 MMHG | SYSTOLIC BLOOD PRESSURE: 126 MMHG | OXYGEN SATURATION: 98 %

## 2024-03-17 PROCEDURE — 99283 EMERGENCY DEPT VISIT LOW MDM: CPT

## 2024-03-17 NOTE — ED ADULT TRIAGE NOTE - CHIEF COMPLAINT QUOTE
pt here for psych eval states "I'm not sure why I'm here" pt arrives with dad who wants pt to be evaluated by psychiatrist due to increased agitated behavior today. pt states she threw a glass picture frame against the floor due to "dad waking me up" states dad tried waking pt up at 5pm. per pt, normally sleeps in until that time "because I have nothing to do" endorses anything can trigger anger denies S/I, H/I, auditory and visual hallucinations well dressed in appearance, pt pacing in triage  hx anxiety depression has not taken prescribed meds in x2 days

## 2024-03-17 NOTE — ED PROVIDER NOTE - PATIENT PORTAL LINK FT
You can access the FollowMyHealth Patient Portal offered by [facility] by registering at the following website: [email or web address]. By joining [email or web address]’s FollowMyHealth portal, you will also be able to view your health information using other applications (apps) compatible with our system.

## 2024-03-17 NOTE — ED ADULT NURSE REASSESSMENT NOTE - NS ED NURSE REASSESS COMMENT FT1
Received report from day RN, Pt is sitting in chair, nutrition noted, pt to be DC'd, pt thinks she is safe to be released, NAD, even unlabored respirations observed. VS as noted. Safety measures maintained. Care ongoing.
Pt a&ox4, resting comfortable in bed, denies CP, SOB, or dyspnea at this time. Airway is patent, speaking in clear and coherent sentences. Respirations are even and unlabored, no signs of respiratory distress. Belongings returned and pt escorted back to her father.

## 2024-03-17 NOTE — ED ADULT NURSE NOTE - OBJECTIVE STATEMENT
Geremias RN: A&OX4, awake, and alert, ambulatory, h/o depression, anxiety. Patient is coming to ED in regards to increase agitation. Patient states she threw a picture frame today against the floor due to her father waking her up at 5PM. Patient endorses increase sleep due to decrease energy and "nothing to do". Patient states she has not taken her medication for 2-3 days. Patient denies any AH/VH/SI/HI. will continue to monitor.

## 2024-03-17 NOTE — ED PROVIDER NOTE - OBJECTIVE STATEMENT
This is a 23-year-old female with a past medical history of major depressive disorder currently on Cymbalta and gabapentin questionable compliance with medications has not taken it over the last 2 to 3 days.  As per family patient she has been sleeping till 5 PM today she became aggressive afterwards and broke a picture frame.  She did not attempt to hurt her father any other family members she has been getting more irritable and having less of an appetite and less hygiene.  She was recently admitted a few weeks prior in January.  She states that she has not had any auditory or visual hallucinations she denies having any suicidal or homicidal ideations she denies having any auditory or visual hallucinations.  She denies any drug or alcohol use she denies having any access to drugs or weapons at home she denies having any fever chills body aches headaches or dizziness.  She states that she does have an appointment tomorrow with her psychiatrist that she sees virtually.  She states that she will be attending her session confirm with father that she will be attending her session and if she does not attend her session to be brought back to the ER.  Denies having any drug or alcohol use at this time.

## 2024-03-17 NOTE — ED PROVIDER NOTE - CLINICAL SUMMARY MEDICAL DECISION MAKING FREE TEXT BOX
This is a 23-year-old female with a past medical history of major depressive disorder currently on Cymbalta and gabapentin questionable compliance with medications has not taken it over the last 2 to 3 days. Depression- will get  involved, patient does not seem harmful to her self, Denies having any SI/HI/AH/VH. patient has appointment tomorrow with her psychiatrist.

## 2024-03-17 NOTE — ED PROVIDER NOTE - NSFOLLOWUPINSTRUCTIONS_ED_ALL_ED_FT
Rest, drink plenty of fluids.  Advance activity as tolerated.  Continue all previously prescribed medications as directed.  Follow up with your primary care physician in 48-72 hours- bring copies of your results.  Return to the ER for worsening or persistent symptoms, and/or ANY NEW OR CONCERNING SYMPTOMS. If you have issues obtaining follow up, please call: [phone] to obtain a doctor or specialist who takes your insurance in your area.  You may call [phone] to make an appointment with the internal medicine clinic.

## 2024-03-17 NOTE — ED BEHAVIORAL HEALTH NOTE - BEHAVIORAL HEALTH NOTE
RAMSES met with patient’s dad oM Bartlett, [phone] in family room to obtain collateral. All information below reported by Dad.  Patient has been become increasingly aggressive and agitated over the last three weeks. Patient is easily set off and has episodes of screaming, banging, and throwing objects lasting about fifteen minutes, multiple times a day. Patient has been stating “I can’t do this anymore” and “life has no meaning” but has not made any explicit suicidal statements. Patient has diagnoses of depression and anxiety since high school. Patient sees a psychiatrist every two weeks, participates in individual and group therapy weekly, and is on medication (father unsure of which). Patient has been sleeping all day and does not want to get out of bed resulting in patient taking medications late/at different times each day. Patient is not enrolled in school and is currently unemployed but looking for a job. Patient has not expressed any HI/AH/VH, does not engage in any self-injurious behaviors, does not use drugs or alcohol, and has no access to guns or weapons at home. Dad believes patient would benefit from an inpatient admission however does not have any safety concerns taking patient home.    no in school - [employer] stopped last sester   1 sibl 19yrs   2nd inpt   hx of therapy   supposed to resume tomorrow - as per pt, kay GUZMAN   medications prescribed unknown names or doseges   partial program discharged 3/4 weeks ago   no ideations of suicid   depression "I cant take it nomore, my situation will never change"   social anxiety and cant maintain the pressure of   good social supports -   gets very upset at the world, grabs objects 2nd became upset when father tried to motivate her to get out of bed   sleeps all day and sleeps all night   bathing infrequently but normal hygene   appeate infrequent as well Chart reviewed.     Collateral requested from  provider ([id number]).     Writer outreached pt's father Mo Bartlett, [phone] to obtain collateral. All information below reported by pt's father:   Pt resides with 4-person family at: [address] [place], [place] [id number]. Pt is not currently employed or enrolled in school. Pt discontinued from school at [employer] due to increased feelings of depression and social anxiety. Pt with strong family social supports and at baseline interacts well with others. Pt with hx of 2 inpt psych admissions. Pt discontinued engagement with her partial program approx. 3 weeks ago. Pt is set to resume outpt mental health services, at [facility], with both therapy and medication management services, tomorrow 3/18/24. Pt is currently on antidepressant medications however father unaware of name, dosage, or prescribing doctor. Father went on to note he is not aware if pt is taking her medications as prescribed. When pt is feeling depressed she sleeps throughout the day and stays up throughout the night. Pt's appetite and hygiene becomes poor, and pt's interpersonal relationships with her family members becomes more irritable.     Today, pt became upset following her father's attempts to motivate her to get out of bed. Pt grabbed a household object and threw it. This is pt's second occurrence of throwing objects. Patient has been stating “I can’t do this anymore” and “my situation will never change” but has not made any explicit suicidal statements. Patient has not expressed any HI/AH/VH, does not engage in any self-injurious behaviors, does not use drugs or alcohol, and has no access to guns or weapons at home.     Father does not have any safety concerns taking patient home.    Provider made aware.

## 2024-04-08 ENCOUNTER — EMERGENCY (EMERGENCY)
Facility: HOSPITAL | Age: 24
LOS: 1 days | Discharge: HOME | End: 2024-04-08
Payer: COMMERCIAL

## 2024-04-08 VITALS
WEIGHT: 190.04 LBS | HEIGHT: 64 IN | OXYGEN SATURATION: 99 % | TEMPERATURE: 98 F | RESPIRATION RATE: 18 BRPM | DIASTOLIC BLOOD PRESSURE: 83 MMHG | HEART RATE: 87 BPM | SYSTOLIC BLOOD PRESSURE: 117 MMHG

## 2024-04-08 PROCEDURE — 99284 EMERGENCY DEPT VISIT MOD MDM: CPT

## 2024-04-08 PROCEDURE — 99283 EMERGENCY DEPT VISIT LOW MDM: CPT | Mod: 25

## 2024-04-08 PROCEDURE — 96372 THER/PROPH/DIAG INJ SC/IM: CPT

## 2024-04-08 RX ORDER — METHOCARBAMOL 500 MG/1
2 TABLET, FILM COATED ORAL
Qty: 18 | Refills: 0
Start: 2024-04-08 | End: 2024-04-10

## 2024-04-08 RX ORDER — KETOROLAC TROMETHAMINE 30 MG/ML
30 SYRINGE (ML) INJECTION ONCE
Refills: 0 | Status: DISCONTINUED | OUTPATIENT
Start: 2024-04-08 | End: 2024-04-08

## 2024-04-08 RX ORDER — LIDOCAINE 4 G/100G
1 CREAM TOPICAL ONCE
Refills: 0 | Status: COMPLETED | OUTPATIENT
Start: 2024-04-08 | End: 2024-04-08

## 2024-04-08 RX ORDER — METHOCARBAMOL 500 MG/1
1500 TABLET, FILM COATED ORAL ONCE
Refills: 0 | Status: COMPLETED | OUTPATIENT
Start: 2024-04-08 | End: 2024-04-08

## 2024-04-08 RX ORDER — LIDOCAINE 4 G/100G
1 CREAM TOPICAL
Qty: 1 | Refills: 0
Start: 2024-04-08 | End: 2024-04-12

## 2024-04-08 RX ORDER — IBUPROFEN 200 MG
1 TABLET ORAL
Qty: 28 | Refills: 0
Start: 2024-04-08 | End: 2024-04-14

## 2024-04-08 RX ADMIN — Medication 30 MILLIGRAM(S): at 21:56

## 2024-04-08 RX ADMIN — METHOCARBAMOL 1500 MILLIGRAM(S): 500 TABLET, FILM COATED ORAL at 21:56

## 2024-04-08 RX ADMIN — LIDOCAINE 1 PATCH: 4 CREAM TOPICAL at 21:56

## 2024-04-08 NOTE — ED PROVIDER NOTE - OBJECTIVE STATEMENT
23-year-old female with a history of PCOS presents with left-sided neck and shoulder pain that began 5 months ago.  Patient reports the pain is intermittent in nature, often after waking up in the morning however it has been increasing in frequency and is becoming less responsive to Tylenol.  Patient denies any known injury. Denies LE numbness, tingling, weakness, saddle anesthesia, fever, chills, IVDU, hx of cancer, bowel or bladder incontinence or any other concerning features.

## 2024-04-08 NOTE — ED PROVIDER NOTE - PATIENT PORTAL LINK FT
You can access the FollowMyHealth Patient Portal offered by [facility] by registering at the following website: [email or web address]. By joining [facility]’s FollowMyHealth portal, you will also be able to view your health information using other applications (apps) compatible with our system.

## 2024-04-08 NOTE — ED PROVIDER NOTE - NSFOLLOWUPINSTRUCTIONS_ED_ALL_ED_FT
Follow up with your Primary Care Doctor within 7 days. If you do not have one, you can call the Internal Medicine office number below and make an appointment.    [facility]  Internal Medicine  [address] [place] [address]  Phone: [phone]  Fax: [phone]     Pain medications sent to the pharmacy. Take as prescribed and as needed.    If you experience any new or worsening symptoms or if you are concerned you can always come back to the emergency for a re-evaluation.    Back Pain    Back pain can be scary. But even when the pain is severe, it usually goes away on its own within a few weeks. The cases that require urgent care or surgery are rare. Do not assume the worst. Almost everyone gets back pain at some point.     See your doctor or nurse if you have back pain and you:    -Recently had a fall or an injury to your back    -Have numbness or weakness in your legs    -Have problems with bladder or bowel control    -Have unexplained weight loss    -Have a fever or feel sick in other ways    -Take steroid medicine, such as prednisone, on a regular basis    -Have diabetes or a medical problem that weakens your immune system    -Have a history of cancer or osteoporosis    You should also see a doctor if:    -Your back pain is so severe that you cannot perform simple tasks    -Your back pain does not start to improve within 4 weeks    Many different things can cause low back pain. Most of the time, doctors do not know the exact cause.    Back pain can happen if you strain a muscle. This is often what has happened when a person "throws out" their back. This refers to pain that starts suddenly after physical activity, like lifting something heavy or bending the back.    Back pain can also happen if you have:    -Damaged, bulging, or torn discs    -Arthritis affecting the joints of the spine    -Bony growths on the vertebrae that crowd nearby nerves    -A vertebra out of place    -Narrowing in the spinal canal    -A tumor or infection (but this is very rare)    Most people with an episode of low back pain do not have a serious medical problem, and can try simple treatments such as:    -Staying active – The best thing you can do is to stay as active as possible. People with low back pain recover faster if they stay active. If your pain is severe, you might need to rest for a day or 2. But it's important to get back to walking and moving as soon as possible. While you should avoid heavy lifting and sports while your back hurts, try to keep doing your normal daily activities.    -Heat – Some people find that it helps to use a heating pad or heated wrap. Be careful to avoid high heat settings to prevent skin burns.    -Medicines – First, you can try pain medicines that you can get without a prescription. In many cases, doctors suggest first trying a nonsteroidal antiinflammatory drug, or "NSAID." NSAIDs include ibuprofen (sample brand names: Advil, Motrin) and naproxen (sample brand name: Aleve). These might work better than acetaminophen (Tylenol) for back pain.    -Treatments to help with symptoms – Some treatments might help you feel better for a little while. They include:    -Spinal manipulation – This is when a chiropractor, physical therapist, or other professional moves or "adjusts" the joints of your back. If you want to try this, talk to your doctor or nurse first.    -Acupuncture – This is when someone who knows traditional Chinese medicine inserts tiny needles into your body to block pain signals.    -Massage    While back pain usually goes away within a few weeks, some people do continue to have pain for longer. In this case, additional treatments might include:    -Self care – This involves being aware of your pain. While you should rest when you need to, it's important to stay active as much as you can. Things like applying heat and doing gentle stretches can help you feel better, too.    -Physical therapy – A physical therapist is an exercise expert who can teach you stretches and movements to help strengthen your muscles. The goal is to relieve pain but also help you get back to your normal activities. Exercises you can try include walking, swimming, or using an exercise bike. Some people also find that Marquise Chi or yoga can help with their back pain. Finding activities you enjoy can help you stay active.    -Reducing stress – Some people find that it helps to try something called "mindfulness-based stress reduction." This involves going to a group program to practice relaxation and meditation. If your back pain is making you feel anxious or depressed, talk to your doctor or nurse. There are other treatments that can help with these problems.

## 2024-04-08 NOTE — ED ADULT NURSE NOTE - NSFALLUNIVINTERV_ED_ALL_ED
Bed/Stretcher in lowest position, wheels locked, appropriate side rails in place/Call bell, personal items and telephone in reach/Instruct patient to call for assistance before getting out of bed/chair/stretcher/Non-slip footwear applied when patient is off stretcher/[place] to call system/Physically safe environment - no spills, clutter or unnecessary equipment/Purposeful proactive rounding/Room/bathroom lighting operational, light cord in reach

## 2024-04-08 NOTE — ED PROVIDER NOTE - PHYSICAL EXAMINATION
Back is symmetrical, scapulae are symmetric. Neck has full range of motion. No spinal or paraspinal tenderness, deformity or step-offs. All limbs equally strong 5+ bilaterally. Strong ankle dorsiflexion and plantar flexion against resistance bilaterally. Strong flexion/extension of big toe bilaterally. Pt is able to walk in straight line with steady gait. No recent weight loss or night sweats. No saddle anesthesia, no bowel/bladder incontinence or retention, no lower extremity weakness. +trapezius muscle tenderness.

## 2024-04-08 NOTE — ED ADULT NURSE NOTE - NS_NURSE_DISC_TEACHING_YN_ED_ALL_ED
PAST MEDICAL HISTORY:  Asthma     Diabetes     Glaucoma     HTN (hypertension)     Hypercholesterolemia      No

## 2024-04-08 NOTE — ED PROVIDER NOTE - CLINICAL SUMMARY MEDICAL DECISION MAKING FREE TEXT BOX
23-year-old female with a history of PCOS presents with left-sided neck and shoulder pain that began 5 months ago.  Patient reports the pain is intermittent in nature, often after waking up in the morning however it has been increasing in frequency and is becoming less responsive to Tylenol.  Patient denies any known injury. Denies LE numbness, tingling, weakness, saddle anesthesia, fever, chills, IVDU, hx of cancer, bowel or bladder incontinence or any other concerning features.     +left sided trapezius muscle tenderness. Well appearing on exam, neurologically intact with no concerning features. Will give pain medications and have patient follow up 23-year-old female with a history of PCOS presents with left-sided neck and shoulder pain that began 5 months ago.  Patient reports the pain is intermittent in nature, often after waking up in the morning however it has been increasing in frequency and is becoming less responsive to Tylenol.  Patient denies any known injury. Denies LE numbness, tingling, weakness, saddle anesthesia, fever, chills, IVDU, hx of cancer, bowel or bladder incontinence or any other concerning features.     +left sided trapezius muscle tenderness. Well appearing on exam, neurologically intact with no concerning features. Will give pain medications and have patient follow up with PCP.

## 2024-05-08 NOTE — ED PROVIDER NOTE - EKG ADDITIONAL QUESTION - PERFORMED INDEPENDENT VISUALIZATION
PCP: Lorrie Rodriguez MD    Last appt: [unfilled]  Future Appointments   Date Time Provider Department Center   5/30/2024  1:00 PM Lorrie Rodriguez MD [facility]       Requested Prescriptions     Pending Prescriptions Disp Refills    JANUVIA 100 MG tablet [Pharmacy Med Name: JANUVIA 100 MG TABLET] 30 tablet 5     Sig: TAKE 1 TABLET BY MOUTH EVERY DAY IN THE MORNING       Prior labs and Blood pressures:  BP Readings from Last 3 Encounters:   02/29/24 120/77   02/01/24 117/75   11/02/23 126/85     Lab Results   Component Value Date/Time     01/23/2024 10:07 AM    K 4.4 01/23/2024 10:07 AM    CL 98 01/23/2024 10:07 AM    CO2 23 01/23/2024 10:07 AM    BUN 10 01/23/2024 10:07 AM    GFRAA >60 10/20/2021 09:04 AM     No results found for: \"HBA1C\", \"XPW1POUF\"  Lab Results   Component Value Date/Time    CHOL 139 08/03/2023 11:40 AM    HDL 55 08/03/2023 11:40 AM    LDL 56.8 08/03/2023 11:40 AM    VLDL 27.2 08/03/2023 11:40 AM    VLDL 30 05/06/2022 12:00 AM     No results found for: \"VITD3\", \"VD3RIA\"    Lab Results   Component Value Date/Time    TSH 1.300 05/20/2021 09:19 AM        Yes

## 2024-06-30 NOTE — ED BEHAVIORAL HEALTH ASSESSMENT NOTE - NSBHMSEREMMEM_PSY_A_CORE
Problem: Adult Inpatient Plan of Care  Goal: Plan of Care Review  Description: The Plan of Care Review/Shift note should be completed every shift.  The Outcome Evaluation is a brief statement about your assessment that the patient is improving, declining, or no change.  This information will be displayed automatically on your shift  note.  Outcome: Progressing   Goal Outcome Evaluation:       Patient vitally stable. Reports minimal pain, denies need for pain meds. MMR and Tdap given. Patient would like to discharge later today.                   Normal

## 2024-11-18 NOTE — BH PATIENT PROFILE - NSPROPTRIGHTCAREGIVER_GEN_A_NUR
INITIAL VISIT NOTE     Consult request:  Shani Starr MD        HISTORY OF PRESENT ILLNESS      The patient presents for evaluation of left shoulder pain.    She experienced a fall on a slippery basement floor four months ago, during which she attempted to catch herself. As a result, she has been suffering from pain in her left shoulder, specifically in the upper arm area. The pain extends down her entire arm and is severe enough to disrupt her sleep. Additionally, she hears a clicking sound when she raises her arm.     She is right-hand dominant and has no prior history of left shoulder issues. Despite undergoing chiropractic treatment and physical therapy for two months, she has not found relief.         PAST MEDICAL, FAMILY AND SOCIAL HISTORY      Past Medical History:   Diagnosis Date    Allergy     see chart notes    Atrophic vaginitis 05/26/2016    Chronic radicular pain of lower back     Colon polyp     Distal radius fracture, left 12/22/2015    Diverticulitis     Hypercholesterolemia     Hypothyroidism     Neck pain 10/18/2016    Obesity     Obesity (BMI 30.0-34.9) 01/20/2022    Reactive depression (situational)     Recurrent UTI 05/26/2016    Restless leg syndrome     Seasonal allergies     Urinary tract infection     Wrist fracture, left 12/2015      Past Surgical History:   Procedure Laterality Date    Back surgery  05/16/2012    lumbar fusion-decompression L4-L5    Breast biopsy Left     2023    Carpal tunnel release  10/19/2010    left    Colonoscopy  07/17/2015    Colonoscopy w/ polypectomy  07/17/2012    diverticulosis, redundant colon    Eye surgery  11/28/2007    cataract extraction with IOL     Eye surgery  12/03/2003    cataract extraction with IOL     Hysterectomy      Spine surgery      2013 mid back surgery    Tonsillectomy        Family History   Problem Relation Age of Onset    Emphysema Mother     COPD Mother     Early death Father      Depression Father     Diabetes Sister     Myocardial Infarction Brother     Alcohol Abuse Son      Social History     Occupational History    Occupation: retired   Tobacco Use    Smoking status: Former     Current packs/day: 0.00     Average packs/day: 0.5 packs/day for 2.0 years (1.0 ttl pk-yrs)     Types: Cigarettes     Start date:      Quit date:      Years since quittin.9    Smokeless tobacco: Never   Vaping Use    Vaping status: never used   Substance and Sexual Activity    Alcohol use: Not Currently     Alcohol/week: 2.0 standard drinks of alcohol     Types: 2 Standard drinks or equivalent per week     Comment: Occassionally    Drug use: Never    Sexual activity: Not Currently      Current Outpatient Medications   Medication Sig Dispense Refill    gabapentin (NEURONTIN) 300 MG capsule TAKE 1 CAPSULE BY MOUTH AT BEDTIME 90 capsule 0    furosemide (LASIX) 20 MG tablet TAKE 1 TABLET BY MOUTH DAILY 90 tablet 3    MULTIPLE VITAMIN PO Take 1 tablet by mouth daily.      Multiple Vitamins-Minerals (PRESERVISION AREDS PO) Take 1 tablet by mouth daily.      aspirin (ECOTRIN) 81 MG EC tablet Take 81 mg by mouth daily.      levothyroxine 125 MCG tablet TAKE 1 TABLET BY MOUTH 5 DAYS A WEEK 64 tablet 0    levothyroxine 150 MCG tablet TAKE 1 TABLET BY MOUTH 2 DAYS A WEEK 24 tablet 2    rivaroxaban (Xarelto) 20 MG Tab Take 1 tablet by mouth daily (with dinner). 90 tablet 2    atorvastatin (LIPITOR) 40 MG tablet TAKE 1 TABLET BY MOUTH DAILY 90 tablet 2    Dilt- MG 24 hr capsule TAKE 1 CAPSULE BY MOUTH IN THE MORNING AND IN THE EVENING 180 capsule 3    Unable to Find Medication Take 1 Dose by mouth daily. Super beets      esomeprazole (NexIUM) 40 MG capsule Take 40 mg by mouth in the morning and 40 mg in the evening.      estrogens, conjugated (PREMARIN) vaginal cream 1/2 applicator applied as directed 3 nights per week 30 g 12    B Complex Vitamins (VITAMIN B COMPLEX) tablet Take 1 tablet by mouth daily.       Omega-3 Fatty Acids (FISH OIL) 1200 MG capsule Take 1,200 mg by mouth daily.       No current facility-administered medications for this visit.      ALLERGIES:   Allergen Reactions    Clindamycin SWELLING    Amoxicillin-Pot Clavulanate      ITCHING/RASH      Azithromycin      SEVERE ITCHING     Ciprofloxacin      ITCHING/RASH    Effexor [Venlafaxine Hydrochloride] NAUSEA     Body aches, nausea    Penicillins RASH    Pristiq [Desvenlafaxine Succinate Monohydrate] ARTHRALGIA       REVIEW OF SYSTEMS      Complete review of systems was performed.  All other systems remain negative except as stated in the history of present illness above.       PHYSICAL EXAM      Visit Vitals  Resp 16   Ht 5' 7\" (1.702 m)   Wt 98 kg (216 lb)   BMI 33.83 kg/m²      The patient is well developed and well nourished in no acute distress. The patient is awake, alert, and oriented to time, place, and person.  Patient responds appropriately to questions and answers.  The patient can rise to a standing position without difficulty.  Has normal coordination of both upper and lower extremities.  There are no rashes or sores on the head and neck, spine or upper extremities.  The neck and spine appeared symmetrical with good range of motion, no instability, and normal muscle tone.  Examination of the bilateral upper extremities reveals normal capillary refill.  There is no cyanosis or edema.  Left shoulder demonstrates some mild decreased range of motion.  Rotator cuff weakness with 2 finger abduction testing and external rotation.  Positive impingement test for pain.  No instability.    STUDY INTERPRETATION:   X-rays were reviewed    ASSESSMENT/PLAN      IMPRESSION:   Left shoulder pain probable rotator cuff tear    TREATMENT AND RECOMMENDATIONS:   At this point she has failed physical therapy.  I recommended an MRI evaluation.  I will see her back once the MRI is complete for further recommendations.  I instructed her on range of motion  exercises to help prevent stiffness.  She is heading into carpal tunnel surgery all she can take for pain currently is Tylenol.  It has been nice to see Chantel in the office.  Thank you for allowing me to participate in the patient's care.  cc: Shani Starr MD       declines

## 2025-01-24 ENCOUNTER — OUTPATIENT (OUTPATIENT)
Dept: OUTPATIENT SERVICES | Facility: HOSPITAL | Age: 25
LOS: 1 days | Discharge: ANOTHER HOSPITAL | End: 2025-01-24
Payer: COMMERCIAL

## 2025-01-24 PROCEDURE — 90839 PSYTX CRISIS INITIAL 60 MIN: CPT | Mod: 95

## 2025-01-28 DIAGNOSIS — F32.A DEPRESSION, UNSPECIFIED: ICD-10-CM

## 2025-01-28 DIAGNOSIS — F41.1 GENERALIZED ANXIETY DISORDER: ICD-10-CM

## 2025-03-14 ENCOUNTER — OUTPATIENT (OUTPATIENT)
Dept: OUTPATIENT SERVICES | Facility: HOSPITAL | Age: 25
LOS: 1 days | Discharge: ANOTHER HOSPITAL | End: 2025-03-14
Payer: COMMERCIAL

## 2025-03-14 PROCEDURE — 90839 PSYTX CRISIS INITIAL 60 MIN: CPT | Mod: 95

## 2025-03-17 PROCEDURE — 90791 PSYCH DIAGNOSTIC EVALUATION: CPT | Mod: 95

## 2025-03-18 DIAGNOSIS — F41.1 GENERALIZED ANXIETY DISORDER: ICD-10-CM

## 2025-03-18 PROCEDURE — 90792 PSYCH DIAG EVAL W/MED SRVCS: CPT

## 2025-03-24 PROCEDURE — 90834 PSYTX W PT 45 MINUTES: CPT | Mod: 95

## 2025-03-31 PROCEDURE — 90834 PSYTX W PT 45 MINUTES: CPT | Mod: 95

## 2025-04-14 PROCEDURE — 99214 OFFICE O/P EST MOD 30 MIN: CPT

## 2025-04-14 PROCEDURE — 90836 PSYTX W PT W E/M 45 MIN: CPT

## 2025-04-14 PROCEDURE — 90834 PSYTX W PT 45 MINUTES: CPT

## 2025-05-07 PROCEDURE — 90834 PSYTX W PT 45 MINUTES: CPT | Mod: 93

## 2025-05-19 PROCEDURE — 99214 OFFICE O/P EST MOD 30 MIN: CPT | Mod: 95

## 2025-05-19 PROCEDURE — 90834 PSYTX W PT 45 MINUTES: CPT

## 2025-05-19 PROCEDURE — 90836 PSYTX W PT W E/M 45 MIN: CPT | Mod: 95

## 2025-05-27 PROCEDURE — 90834 PSYTX W PT 45 MINUTES: CPT

## 2025-06-02 ENCOUNTER — APPOINTMENT (OUTPATIENT)
Dept: PSYCHIATRY | Facility: CLINIC | Age: 25
End: 2025-06-02

## 2025-06-02 PROCEDURE — 90834 PSYTX W PT 45 MINUTES: CPT | Mod: 95

## 2025-06-20 PROCEDURE — 90837 PSYTX W PT 60 MINUTES: CPT | Mod: 95

## 2025-06-24 PROCEDURE — 90834 PSYTX W PT 45 MINUTES: CPT | Mod: 95

## 2025-06-26 ENCOUNTER — TRANSCRIPTION ENCOUNTER (OUTPATIENT)
Age: 25
End: 2025-06-26

## 2025-07-09 NOTE — ED BEHAVIORAL HEALTH ASSESSMENT NOTE - NS ED BHA MED ROS ENT MOUTH
Render Post-Care Instructions In Note?: no
Consent: The patient's consent was obtained including but not limited to risks of crusting, scabbing, blistering, scarring, darker or lighter pigmentary change, recurrence, incomplete removal and infection.
Show Aperture Variable?: Yes
Number Of Freeze-Thaw Cycles: 1 freeze-thaw cycle
Duration Of Freeze Thaw-Cycle (Seconds): 0
Post-Care Instructions: CARE INSTRUCTIONS\\nCryotherapy\\n\\nThis information is also available at [email or web address] \\n\\nCryotherapy, using liquid nitrogen, creates a superficial chemical burn. This is used to destroy multiple types of benign and even precancerous lesions on the skin.\\nWhat To Expect\\n    • Anticipate redness, with itching or burning pain initially. Tylenol (if you are able to take Tylenol) or cold compresses can help with any significant discomfort. \\n    • Blisters may occur at the treated area(s) over the next few days. The blister may be clear or bloody and may begin to weep or drain. \\n    • After lesion heals, skin may be slightly darker or lighter at treatment site; this typically fades within a few weeks.\\nCare of Treated Area(s)\\n    • Wash gently each day, but do not scrub. \\n    • Apply Vaseline frequently to minimize irritation and reduce scab formation. \\n    • If the blister is tense and uncomfortable, you may clean with alcohol and puncture with a sterile (cleansed in alcohol) needle. Wear gloves, or wash hands immediately before and after this procedure. \\n    • If open or draining, you may apply Polysporin antibiotic ointment or Vaseline and a bandage, if needed. \\n    • Do not pick at or pull off scab. Allow to completely heal.\\nReturn to Clinic\\n    • If lesion is persistent 3 to 4 weeks after treatment, please return to clinic for additional treatment.\\nNOTE: Cryotherapy may not fully destroy every lesion, every time.  Some lesions may require multiple treatments (which could lead to multiple charges).  Others may require a biopsy if destruction is not effective.
Detail Level: Simple
No complaints

## 2025-07-13 NOTE — ED PROVIDER NOTE - CHIEF COMPLAINT
Resp at bedside for breathing treatment.   The patient is a 19y Female complaining of psychiatric evaluation.

## 2025-07-25 PROCEDURE — 90832 PSYTX W PT 30 MINUTES: CPT | Mod: 93
